# Patient Record
Sex: FEMALE | Race: WHITE | NOT HISPANIC OR LATINO | Employment: UNEMPLOYED | URBAN - METROPOLITAN AREA
[De-identification: names, ages, dates, MRNs, and addresses within clinical notes are randomized per-mention and may not be internally consistent; named-entity substitution may affect disease eponyms.]

---

## 2023-11-26 ENCOUNTER — HOSPITAL ENCOUNTER (EMERGENCY)
Facility: HOSPITAL | Age: 88
Discharge: HOME | DRG: 177 | End: 2023-11-27
Attending: STUDENT IN AN ORGANIZED HEALTH CARE EDUCATION/TRAINING PROGRAM
Payer: MEDICARE

## 2023-11-26 DIAGNOSIS — U07.1 COVID-19: Primary | ICD-10-CM

## 2023-11-26 DIAGNOSIS — J18.9 COMMUNITY ACQUIRED PNEUMONIA, UNSPECIFIED LATERALITY: ICD-10-CM

## 2023-11-26 DIAGNOSIS — J12.82 PNEUMONIA DUE TO COVID-19 VIRUS: ICD-10-CM

## 2023-11-26 DIAGNOSIS — U07.1 PNEUMONIA DUE TO COVID-19 VIRUS: ICD-10-CM

## 2023-11-26 DIAGNOSIS — R53.1 GENERALIZED WEAKNESS: ICD-10-CM

## 2023-11-26 PROCEDURE — 96365 THER/PROPH/DIAG IV INF INIT: CPT

## 2023-11-26 PROCEDURE — 96375 TX/PRO/DX INJ NEW DRUG ADDON: CPT

## 2023-11-26 PROCEDURE — 96367 TX/PROPH/DG ADDL SEQ IV INF: CPT

## 2023-11-26 PROCEDURE — 99285 EMERGENCY DEPT VISIT HI MDM: CPT | Mod: 25 | Performed by: STUDENT IN AN ORGANIZED HEALTH CARE EDUCATION/TRAINING PROGRAM

## 2023-11-26 PROCEDURE — 96366 THER/PROPH/DIAG IV INF ADDON: CPT

## 2023-11-26 NOTE — Clinical Note
Is the patient on isolation?: Yes   Do you expect the patient to require telemetry (informational-only for bed management): No   Do you expect the patient to require observation or inpt? (informational-only for bed management): Observation

## 2023-11-27 ENCOUNTER — APPOINTMENT (OUTPATIENT)
Dept: RADIOLOGY | Facility: HOSPITAL | Age: 88
DRG: 177 | End: 2023-11-27
Payer: MEDICARE

## 2023-11-27 ENCOUNTER — HOSPITAL ENCOUNTER (INPATIENT)
Facility: HOSPITAL | Age: 88
LOS: 8 days | Discharge: NURSING, REHAB OR LONG-TERM CARE | DRG: 177 | End: 2023-12-06
Attending: STUDENT IN AN ORGANIZED HEALTH CARE EDUCATION/TRAINING PROGRAM | Admitting: INTERNAL MEDICINE
Payer: MEDICARE

## 2023-11-27 VITALS
SYSTOLIC BLOOD PRESSURE: 169 MMHG | DIASTOLIC BLOOD PRESSURE: 90 MMHG | TEMPERATURE: 101.2 F | WEIGHT: 123.02 LBS | BODY MASS INDEX: 24.15 KG/M2 | RESPIRATION RATE: 20 BRPM | OXYGEN SATURATION: 90 % | HEIGHT: 60 IN | HEART RATE: 110 BPM

## 2023-11-27 DIAGNOSIS — I48.91 ATRIAL FIBRILLATION WITH RAPID VENTRICULAR RESPONSE (MULTI): ICD-10-CM

## 2023-11-27 DIAGNOSIS — I48.91 ATRIAL FIBRILLATION WITH RVR (MULTI): ICD-10-CM

## 2023-11-27 DIAGNOSIS — R11.0 NAUSEA: ICD-10-CM

## 2023-11-27 DIAGNOSIS — U07.1 PNEUMONIA DUE TO COVID-19 VIRUS: ICD-10-CM

## 2023-11-27 DIAGNOSIS — I48.20 CHRONIC ATRIAL FIBRILLATION (MULTI): ICD-10-CM

## 2023-11-27 DIAGNOSIS — R53.1 GENERALIZED WEAKNESS: Primary | ICD-10-CM

## 2023-11-27 DIAGNOSIS — R13.12 OROPHARYNGEAL DYSPHAGIA: ICD-10-CM

## 2023-11-27 DIAGNOSIS — J12.82 PNEUMONIA DUE TO COVID-19 VIRUS: ICD-10-CM

## 2023-11-27 PROBLEM — R41.82 ALTERED MENTAL STATUS: Status: ACTIVE | Noted: 2023-11-27

## 2023-11-27 PROBLEM — E11.9 TYPE 2 DIABETES MELLITUS (MULTI): Status: ACTIVE | Noted: 2023-11-27

## 2023-11-27 LAB
ALBUMIN SERPL BCP-MCNC: 4.1 G/DL (ref 3.4–5)
ALP SERPL-CCNC: 63 U/L (ref 33–136)
ALT SERPL W P-5'-P-CCNC: 26 U/L (ref 7–45)
ANION GAP SERPL CALC-SCNC: 13 MMOL/L (ref 10–20)
ANION GAP SERPL CALC-SCNC: 15 MMOL/L (ref 10–20)
ANION GAP SERPL CALC-SCNC: 15 MMOL/L (ref 10–20)
APTT PPP: 38 SECONDS (ref 27–38)
AST SERPL W P-5'-P-CCNC: 28 U/L (ref 9–39)
BASOPHILS # BLD AUTO: 0.01 X10*3/UL (ref 0–0.1)
BASOPHILS # BLD AUTO: 0.01 X10*3/UL (ref 0–0.1)
BASOPHILS # BLD AUTO: 0.02 X10*3/UL (ref 0–0.1)
BASOPHILS NFR BLD AUTO: 0.2 %
BASOPHILS NFR BLD AUTO: 0.2 %
BASOPHILS NFR BLD AUTO: 0.3 %
BILIRUB SERPL-MCNC: 0.7 MG/DL (ref 0–1.2)
BNP SERPL-MCNC: 507 PG/ML (ref 0–99)
BUN SERPL-MCNC: 20 MG/DL (ref 6–23)
BUN SERPL-MCNC: 20 MG/DL (ref 6–23)
BUN SERPL-MCNC: 21 MG/DL (ref 6–23)
CALCIUM SERPL-MCNC: 8 MG/DL (ref 8.6–10.3)
CALCIUM SERPL-MCNC: 8.3 MG/DL (ref 8.6–10.3)
CALCIUM SERPL-MCNC: 8.6 MG/DL (ref 8.6–10.3)
CARDIAC TROPONIN I PNL SERPL HS: 13 NG/L (ref 0–13)
CARDIAC TROPONIN I PNL SERPL HS: 39 NG/L (ref 0–13)
CHLORIDE SERPL-SCNC: 91 MMOL/L (ref 98–107)
CHLORIDE SERPL-SCNC: 96 MMOL/L (ref 98–107)
CHLORIDE SERPL-SCNC: 98 MMOL/L (ref 98–107)
CO2 SERPL-SCNC: 25 MMOL/L (ref 21–32)
CO2 SERPL-SCNC: 28 MMOL/L (ref 21–32)
CO2 SERPL-SCNC: 29 MMOL/L (ref 21–32)
CREAT SERPL-MCNC: 0.81 MG/DL (ref 0.5–1.05)
CREAT SERPL-MCNC: 0.89 MG/DL (ref 0.5–1.05)
CREAT SERPL-MCNC: 1.01 MG/DL (ref 0.5–1.05)
D DIMER PPP FEU-MCNC: 694 NG/ML FEU
EOSINOPHIL # BLD AUTO: 0 X10*3/UL (ref 0–0.4)
EOSINOPHIL NFR BLD AUTO: 0 %
ERYTHROCYTE [DISTWIDTH] IN BLOOD BY AUTOMATED COUNT: 13.5 % (ref 11.5–14.5)
ERYTHROCYTE [DISTWIDTH] IN BLOOD BY AUTOMATED COUNT: 13.8 % (ref 11.5–14.5)
ERYTHROCYTE [DISTWIDTH] IN BLOOD BY AUTOMATED COUNT: 13.9 % (ref 11.5–14.5)
FIBRINOGEN PPP-MCNC: 264 MG/DL (ref 200–400)
GFR SERPL CREATININE-BSD FRML MDRD: 52 ML/MIN/1.73M*2
GFR SERPL CREATININE-BSD FRML MDRD: 61 ML/MIN/1.73M*2
GFR SERPL CREATININE-BSD FRML MDRD: 68 ML/MIN/1.73M*2
GLUCOSE SERPL-MCNC: 134 MG/DL (ref 74–99)
GLUCOSE SERPL-MCNC: 177 MG/DL (ref 74–99)
GLUCOSE SERPL-MCNC: 199 MG/DL (ref 74–99)
HCT VFR BLD AUTO: 37 % (ref 36–46)
HCT VFR BLD AUTO: 39.9 % (ref 36–46)
HCT VFR BLD AUTO: 45.5 % (ref 36–46)
HGB BLD-MCNC: 12.7 G/DL (ref 12–16)
HGB BLD-MCNC: 13.8 G/DL (ref 12–16)
HGB BLD-MCNC: 15.9 G/DL (ref 12–16)
IMM GRANULOCYTES # BLD AUTO: 0.03 X10*3/UL (ref 0–0.5)
IMM GRANULOCYTES # BLD AUTO: 0.04 X10*3/UL (ref 0–0.5)
IMM GRANULOCYTES # BLD AUTO: 0.05 X10*3/UL (ref 0–0.5)
IMM GRANULOCYTES NFR BLD AUTO: 0.5 % (ref 0–0.9)
IMM GRANULOCYTES NFR BLD AUTO: 0.6 % (ref 0–0.9)
IMM GRANULOCYTES NFR BLD AUTO: 1.1 % (ref 0–0.9)
INR PPP: 1.3 (ref 0.9–1.1)
LACTATE SERPL-SCNC: 1.2 MMOL/L (ref 0.4–2)
LYMPHOCYTES # BLD AUTO: 0.24 X10*3/UL (ref 0.8–3)
LYMPHOCYTES # BLD AUTO: 0.31 X10*3/UL (ref 0.8–3)
LYMPHOCYTES # BLD AUTO: 0.38 X10*3/UL (ref 0.8–3)
LYMPHOCYTES NFR BLD AUTO: 3.5 %
LYMPHOCYTES NFR BLD AUTO: 6 %
LYMPHOCYTES NFR BLD AUTO: 6.4 %
MAGNESIUM SERPL-MCNC: 1.47 MG/DL (ref 1.6–2.4)
MAGNESIUM SERPL-MCNC: 1.71 MG/DL (ref 1.6–2.4)
MCH RBC QN AUTO: 34.4 PG (ref 26–34)
MCH RBC QN AUTO: 34.5 PG (ref 26–34)
MCH RBC QN AUTO: 34.7 PG (ref 26–34)
MCHC RBC AUTO-ENTMCNC: 34.3 G/DL (ref 32–36)
MCHC RBC AUTO-ENTMCNC: 34.6 G/DL (ref 32–36)
MCHC RBC AUTO-ENTMCNC: 34.9 G/DL (ref 32–36)
MCV RBC AUTO: 100 FL (ref 80–100)
MCV RBC AUTO: 101 FL (ref 80–100)
MCV RBC AUTO: 99 FL (ref 80–100)
MONOCYTES # BLD AUTO: 0.24 X10*3/UL (ref 0.05–0.8)
MONOCYTES # BLD AUTO: 0.47 X10*3/UL (ref 0.05–0.8)
MONOCYTES # BLD AUTO: 0.49 X10*3/UL (ref 0.05–0.8)
MONOCYTES NFR BLD AUTO: 4.6 %
MONOCYTES NFR BLD AUTO: 6.4 %
MONOCYTES NFR BLD AUTO: 9 %
NEUTROPHILS # BLD AUTO: 3.2 X10*3/UL (ref 1.6–5.5)
NEUTROPHILS # BLD AUTO: 4.39 X10*3/UL (ref 1.6–5.5)
NEUTROPHILS # BLD AUTO: 9.81 X10*3/UL (ref 1.6–5.5)
NEUTROPHILS NFR BLD AUTO: 84.2 %
NEUTROPHILS NFR BLD AUTO: 85.8 %
NEUTROPHILS NFR BLD AUTO: 91.2 %
NRBC BLD-RTO: 0 /100 WBCS (ref 0–0)
PHOSPHATE SERPL-MCNC: 3.5 MG/DL (ref 2.5–4.9)
PLATELET # BLD AUTO: 127 X10*3/UL (ref 150–450)
PLATELET # BLD AUTO: 137 X10*3/UL (ref 150–450)
PLATELET # BLD AUTO: 141 X10*3/UL (ref 150–450)
POTASSIUM SERPL-SCNC: 3.7 MMOL/L (ref 3.5–5.3)
POTASSIUM SERPL-SCNC: 3.9 MMOL/L (ref 3.5–5.3)
POTASSIUM SERPL-SCNC: 4 MMOL/L (ref 3.5–5.3)
PROCALCITONIN SERPL-MCNC: 0.07 NG/ML
PROT SERPL-MCNC: 7 G/DL (ref 6.4–8.2)
PROTHROMBIN TIME: 15.2 SECONDS (ref 9.8–12.8)
RBC # BLD AUTO: 3.68 X10*6/UL (ref 4–5.2)
RBC # BLD AUTO: 3.98 X10*6/UL (ref 4–5.2)
RBC # BLD AUTO: 4.62 X10*6/UL (ref 4–5.2)
SARS-COV-2 RNA RESP QL NAA+PROBE: DETECTED
SODIUM SERPL-SCNC: 131 MMOL/L (ref 136–145)
SODIUM SERPL-SCNC: 133 MMOL/L (ref 136–145)
SODIUM SERPL-SCNC: 134 MMOL/L (ref 136–145)
WBC # BLD AUTO: 10.8 X10*3/UL (ref 4.4–11.3)
WBC # BLD AUTO: 3.7 X10*3/UL (ref 4.4–11.3)
WBC # BLD AUTO: 5.2 X10*3/UL (ref 4.4–11.3)

## 2023-11-27 PROCEDURE — 84100 ASSAY OF PHOSPHORUS: CPT | Performed by: STUDENT IN AN ORGANIZED HEALTH CARE EDUCATION/TRAINING PROGRAM

## 2023-11-27 PROCEDURE — 36415 COLL VENOUS BLD VENIPUNCTURE: CPT | Performed by: STUDENT IN AN ORGANIZED HEALTH CARE EDUCATION/TRAINING PROGRAM

## 2023-11-27 PROCEDURE — 96361 HYDRATE IV INFUSION ADD-ON: CPT

## 2023-11-27 PROCEDURE — 85025 COMPLETE CBC W/AUTO DIFF WBC: CPT | Performed by: STUDENT IN AN ORGANIZED HEALTH CARE EDUCATION/TRAINING PROGRAM

## 2023-11-27 PROCEDURE — 99284 EMERGENCY DEPT VISIT MOD MDM: CPT | Performed by: INTERNAL MEDICINE

## 2023-11-27 PROCEDURE — 96374 THER/PROPH/DIAG INJ IV PUSH: CPT

## 2023-11-27 PROCEDURE — 70450 CT HEAD/BRAIN W/O DYE: CPT | Performed by: RADIOLOGY

## 2023-11-27 PROCEDURE — 71046 X-RAY EXAM CHEST 2 VIEWS: CPT | Performed by: RADIOLOGY

## 2023-11-27 PROCEDURE — 2500000004 HC RX 250 GENERAL PHARMACY W/ HCPCS (ALT 636 FOR OP/ED): Performed by: STUDENT IN AN ORGANIZED HEALTH CARE EDUCATION/TRAINING PROGRAM

## 2023-11-27 PROCEDURE — 84145 PROCALCITONIN (PCT): CPT | Mod: PORLAB | Performed by: INTERNAL MEDICINE

## 2023-11-27 PROCEDURE — 84484 ASSAY OF TROPONIN QUANT: CPT | Performed by: STUDENT IN AN ORGANIZED HEALTH CARE EDUCATION/TRAINING PROGRAM

## 2023-11-27 PROCEDURE — 83880 ASSAY OF NATRIURETIC PEPTIDE: CPT | Performed by: STUDENT IN AN ORGANIZED HEALTH CARE EDUCATION/TRAINING PROGRAM

## 2023-11-27 PROCEDURE — 87635 SARS-COV-2 COVID-19 AMP PRB: CPT | Performed by: STUDENT IN AN ORGANIZED HEALTH CARE EDUCATION/TRAINING PROGRAM

## 2023-11-27 PROCEDURE — 36415 COLL VENOUS BLD VENIPUNCTURE: CPT | Performed by: INTERNAL MEDICINE

## 2023-11-27 PROCEDURE — 80048 BASIC METABOLIC PNL TOTAL CA: CPT | Mod: CCI | Performed by: STUDENT IN AN ORGANIZED HEALTH CARE EDUCATION/TRAINING PROGRAM

## 2023-11-27 PROCEDURE — 80053 COMPREHEN METABOLIC PANEL: CPT | Performed by: STUDENT IN AN ORGANIZED HEALTH CARE EDUCATION/TRAINING PROGRAM

## 2023-11-27 PROCEDURE — 71046 X-RAY EXAM CHEST 2 VIEWS: CPT

## 2023-11-27 PROCEDURE — 83735 ASSAY OF MAGNESIUM: CPT | Performed by: STUDENT IN AN ORGANIZED HEALTH CARE EDUCATION/TRAINING PROGRAM

## 2023-11-27 PROCEDURE — 85384 FIBRINOGEN ACTIVITY: CPT | Performed by: INTERNAL MEDICINE

## 2023-11-27 PROCEDURE — 85379 FIBRIN DEGRADATION QUANT: CPT | Performed by: INTERNAL MEDICINE

## 2023-11-27 PROCEDURE — 85730 THROMBOPLASTIN TIME PARTIAL: CPT | Performed by: STUDENT IN AN ORGANIZED HEALTH CARE EDUCATION/TRAINING PROGRAM

## 2023-11-27 PROCEDURE — 83605 ASSAY OF LACTIC ACID: CPT | Performed by: STUDENT IN AN ORGANIZED HEALTH CARE EDUCATION/TRAINING PROGRAM

## 2023-11-27 PROCEDURE — 2500000005 HC RX 250 GENERAL PHARMACY W/O HCPCS: Performed by: STUDENT IN AN ORGANIZED HEALTH CARE EDUCATION/TRAINING PROGRAM

## 2023-11-27 PROCEDURE — 85610 PROTHROMBIN TIME: CPT | Performed by: STUDENT IN AN ORGANIZED HEALTH CARE EDUCATION/TRAINING PROGRAM

## 2023-11-27 PROCEDURE — 70450 CT HEAD/BRAIN W/O DYE: CPT

## 2023-11-27 PROCEDURE — 99285 EMERGENCY DEPT VISIT HI MDM: CPT | Mod: 25 | Performed by: STUDENT IN AN ORGANIZED HEALTH CARE EDUCATION/TRAINING PROGRAM

## 2023-11-27 PROCEDURE — 85025 COMPLETE CBC W/AUTO DIFF WBC: CPT | Performed by: INTERNAL MEDICINE

## 2023-11-27 PROCEDURE — 80048 BASIC METABOLIC PNL TOTAL CA: CPT | Mod: CCI | Performed by: INTERNAL MEDICINE

## 2023-11-27 PROCEDURE — 2500000004 HC RX 250 GENERAL PHARMACY W/ HCPCS (ALT 636 FOR OP/ED): Performed by: INTERNAL MEDICINE

## 2023-11-27 PROCEDURE — 99223 1ST HOSP IP/OBS HIGH 75: CPT | Performed by: INTERNAL MEDICINE

## 2023-11-27 RX ORDER — MAGNESIUM SULFATE HEPTAHYDRATE 40 MG/ML
2 INJECTION, SOLUTION INTRAVENOUS ONCE
Status: COMPLETED | OUTPATIENT
Start: 2023-11-27 | End: 2023-11-27

## 2023-11-27 RX ORDER — ACETAMINOPHEN 325 MG/1
650 TABLET ORAL EVERY 4 HOURS PRN
Status: DISCONTINUED | OUTPATIENT
Start: 2023-11-27 | End: 2023-11-27 | Stop reason: HOSPADM

## 2023-11-27 RX ORDER — LOSARTAN POTASSIUM 25 MG/1
25 TABLET ORAL DAILY
COMMUNITY

## 2023-11-27 RX ORDER — GUAIFENESIN 100 MG/5ML
200 SOLUTION ORAL EVERY 4 HOURS PRN
Status: DISCONTINUED | OUTPATIENT
Start: 2023-11-27 | End: 2023-11-27 | Stop reason: HOSPADM

## 2023-11-27 RX ORDER — NIRMATRELVIR AND RITONAVIR 300-100 MG
2 KIT ORAL 2 TIMES DAILY
Qty: 1 DOSE PACK | Refills: 0 | Status: SHIPPED | OUTPATIENT
Start: 2023-11-27 | End: 2023-12-06 | Stop reason: HOSPADM

## 2023-11-27 RX ORDER — POLYETHYLENE GLYCOL 3350 17 G/17G
17 POWDER, FOR SOLUTION ORAL DAILY
Status: DISCONTINUED | OUTPATIENT
Start: 2023-11-27 | End: 2023-11-27 | Stop reason: HOSPADM

## 2023-11-27 RX ORDER — HEPARIN SODIUM 5000 [USP'U]/ML
5000 INJECTION, SOLUTION INTRAVENOUS; SUBCUTANEOUS EVERY 8 HOURS
Status: DISCONTINUED | OUTPATIENT
Start: 2023-11-27 | End: 2023-11-27 | Stop reason: HOSPADM

## 2023-11-27 RX ORDER — METOPROLOL SUCCINATE 100 MG/1
100 TABLET, EXTENDED RELEASE ORAL DAILY
COMMUNITY

## 2023-11-27 RX ORDER — METOPROLOL TARTRATE 25 MG/1
25 TABLET, FILM COATED ORAL ONCE
Status: COMPLETED | OUTPATIENT
Start: 2023-11-28 | End: 2023-11-28

## 2023-11-27 RX ORDER — NIRMATRELVIR AND RITONAVIR 300-100 MG
2 KIT ORAL 2 TIMES DAILY
Qty: 1 DOSE PACK | Refills: 0 | Status: SHIPPED | OUTPATIENT
Start: 2023-11-27 | End: 2023-11-27 | Stop reason: SDUPTHER

## 2023-11-27 RX ORDER — METOPROLOL TARTRATE 1 MG/ML
5 INJECTION, SOLUTION INTRAVENOUS ONCE
Status: COMPLETED | OUTPATIENT
Start: 2023-11-27 | End: 2023-11-27

## 2023-11-27 RX ORDER — CEFTRIAXONE 1 G/50ML
1 INJECTION, SOLUTION INTRAVENOUS ONCE
Status: COMPLETED | OUTPATIENT
Start: 2023-11-27 | End: 2023-11-27

## 2023-11-27 RX ORDER — ACETAMINOPHEN 325 MG/1
650 TABLET ORAL ONCE
Status: COMPLETED | OUTPATIENT
Start: 2023-11-27 | End: 2023-11-27

## 2023-11-27 RX ORDER — THYROID 60 MG/1
60 TABLET ORAL 3 TIMES DAILY
COMMUNITY

## 2023-11-27 RX ADMIN — AZITHROMYCIN MONOHYDRATE 500 MG: 500 INJECTION, POWDER, LYOPHILIZED, FOR SOLUTION INTRAVENOUS at 04:06

## 2023-11-27 RX ADMIN — MAGNESIUM SULFATE HEPTAHYDRATE 2 G: 40 INJECTION, SOLUTION INTRAVENOUS at 02:19

## 2023-11-27 RX ADMIN — REMDESIVIR 200 MG: 100 INJECTION, POWDER, LYOPHILIZED, FOR SOLUTION INTRAVENOUS at 14:46

## 2023-11-27 RX ADMIN — SODIUM CHLORIDE 500 ML: 9 INJECTION, SOLUTION INTRAVENOUS at 23:26

## 2023-11-27 RX ADMIN — CEFTRIAXONE SODIUM 1 G: 1 INJECTION, SOLUTION INTRAVENOUS at 03:30

## 2023-11-27 RX ADMIN — ACETAMINOPHEN 650 MG: 325 TABLET ORAL at 07:51

## 2023-11-27 RX ADMIN — HEPARIN SODIUM 5000 UNITS: 5000 INJECTION INTRAVENOUS; SUBCUTANEOUS at 04:57

## 2023-11-27 RX ADMIN — METOPROLOL TARTRATE 5 MG: 5 INJECTION INTRAVENOUS at 23:27

## 2023-11-27 RX ADMIN — HEPARIN SODIUM 5000 UNITS: 5000 INJECTION INTRAVENOUS; SUBCUTANEOUS at 12:35

## 2023-11-27 RX ADMIN — ACETAMINOPHEN 650 MG: 325 TABLET ORAL at 22:07

## 2023-11-27 SDOH — SOCIAL STABILITY: SOCIAL INSECURITY: HAVE YOU HAD THOUGHTS OF HARMING ANYONE ELSE?: NO

## 2023-11-27 SDOH — SOCIAL STABILITY: SOCIAL INSECURITY: ABUSE: ADULT

## 2023-11-27 SDOH — SOCIAL STABILITY: SOCIAL INSECURITY: WERE YOU ABLE TO COMPLETE ALL THE BEHAVIORAL HEALTH SCREENINGS?: YES

## 2023-11-27 ASSESSMENT — ACTIVITIES OF DAILY LIVING (ADL)
GROOMING: INDEPENDENT
HEARING - LEFT EAR: DIFFICULTY WITH NOISE
WALKS IN HOME: INDEPENDENT
JUDGMENT_ADEQUATE_SAFELY_COMPLETE_DAILY_ACTIVITIES: YES
TOILETING: INDEPENDENT
FEEDING YOURSELF: INDEPENDENT
DRESSING YOURSELF: INDEPENDENT
HEARING - RIGHT EAR: DIFFICULTY WITH NOISE
ADEQUATE_TO_COMPLETE_ADL: YES
PATIENT'S MEMORY ADEQUATE TO SAFELY COMPLETE DAILY ACTIVITIES?: YES
BATHING: INDEPENDENT

## 2023-11-27 ASSESSMENT — LIFESTYLE VARIABLES
AUDIT-C TOTAL SCORE: 0
HOW OFTEN DO YOU HAVE 6 OR MORE DRINKS ON ONE OCCASION: NEVER
HOW OFTEN DO YOU HAVE A DRINK CONTAINING ALCOHOL: NEVER
AUDIT-C TOTAL SCORE: 0
SKIP TO QUESTIONS 9-10: 1
HOW MANY STANDARD DRINKS CONTAINING ALCOHOL DO YOU HAVE ON A TYPICAL DAY: PATIENT DOES NOT DRINK

## 2023-11-27 ASSESSMENT — COGNITIVE AND FUNCTIONAL STATUS - GENERAL
PATIENT BASELINE BEDBOUND: NO
MOBILITY SCORE: 24
DAILY ACTIVITIY SCORE: 24

## 2023-11-27 ASSESSMENT — PATIENT HEALTH QUESTIONNAIRE - PHQ9
2. FEELING DOWN, DEPRESSED OR HOPELESS: NOT AT ALL
SUM OF ALL RESPONSES TO PHQ9 QUESTIONS 1 & 2: 0
1. LITTLE INTEREST OR PLEASURE IN DOING THINGS: NOT AT ALL

## 2023-11-27 ASSESSMENT — PAIN - FUNCTIONAL ASSESSMENT: PAIN_FUNCTIONAL_ASSESSMENT: 0-10

## 2023-11-27 ASSESSMENT — PAIN SCALES - GENERAL: PAINLEVEL_OUTOF10: 0 - NO PAIN

## 2023-11-27 ASSESSMENT — COLUMBIA-SUICIDE SEVERITY RATING SCALE - C-SSRS
1. IN THE PAST MONTH, HAVE YOU WISHED YOU WERE DEAD OR WISHED YOU COULD GO TO SLEEP AND NOT WAKE UP?: NO
2. HAVE YOU ACTUALLY HAD ANY THOUGHTS OF KILLING YOURSELF?: NO
6. HAVE YOU EVER DONE ANYTHING, STARTED TO DO ANYTHING, OR PREPARED TO DO ANYTHING TO END YOUR LIFE?: NO

## 2023-11-27 NOTE — ED PROVIDER NOTES
"HPI   Chief Complaint   Patient presents with    Weakness, Gen     Pt weak x1 day, daughter has been carrying around house.        This is a 89-year-old female with past medical history of diabetes (reportedly diet-controlled) and atrial fibrillation on anticoagulation who presents to the emergency department with chief complaint of weakness.  She is brought in by her daughter who provides history.  Patient's daughter states that yesterday evening about 28 hours ago they went to a dinner concert.  When the patient was getting into the car after the dinner about 26-27 hours ago the daughter noted that she had some \"unsteadiness\" and trouble walking which was unusual for her.  Throughout the day today, the patient has seemed slightly more confused and slightly weaker than normal, has not eaten or drink much today despite normally having a good appetite and oral intake.  Patient complains of nausea but denies any pain.  Patient and her daughter both deny any recent trauma, travel, or falls.      History provided by:  Relative   used: No                          Clair Coma Scale Score: 15                  Patient History   No past medical history on file.  No past surgical history on file.  No family history on file.  Social History     Tobacco Use    Smoking status: Never    Smokeless tobacco: Not on file   Substance Use Topics    Alcohol use: Never    Drug use: Never           Physical Exam  Constitutional:       General: She is not in acute distress.     Appearance: She is well-developed. She is not toxic-appearing.   HENT:      Head: Normocephalic and atraumatic.      Mouth/Throat:      Mouth: Mucous membranes are moist.   Eyes:      Conjunctiva/sclera: Conjunctivae normal.      Pupils: Pupils are equal, round, and reactive to light.   Cardiovascular:      Rate and Rhythm: Normal rate. Rhythm irregular.      Pulses: Normal pulses.      Heart sounds: Normal heart sounds.   Pulmonary:      " Effort: Pulmonary effort is normal.      Breath sounds: Normal breath sounds.   Abdominal:      General: There is no distension.      Palpations: Abdomen is soft.      Tenderness: There is no abdominal tenderness.   Musculoskeletal:         General: Normal range of motion.      Cervical back: Neck supple.   Skin:     General: Skin is warm and dry.      Findings: No bruising or rash.   Neurological:      Mental Status: She is alert.      Cranial Nerves: No cranial nerve deficit.      Motor: Motor function is intact.      Comments: Alert, resting in bed with eyes open.  Oriented to self and place.  Knows recent holiday (Thanksgiving) but does not know year or month.  Global 4 out of 5 strength in all 4 extremities with slightly decreased left  strength when compared to right.  Sensation intact to light touch throughout.  No facial droop or slurred speech.         Labs Reviewed   CBC WITH AUTO DIFFERENTIAL   PHOSPHORUS   MAGNESIUM   COMPREHENSIVE METABOLIC PANEL   LACTATE   PROTIME-INR   APTT   TROPONIN I, HIGH SENSITIVITY   B-TYPE NATRIURETIC PEPTIDE   URINALYSIS WITH REFLEX MICROSCOPIC       ED Course & MDM   ED Course as of 11/27/23 0301   Mon Nov 27, 2023   0049 Twelve-lead EKG on my interpretation: Normal sinus rhythm with right bundle branch block, short AZ interval 47 ms, normal axis, no STEMI.  [EC]   0153 Labs reviewed, CBC without leukocytosis, CMP with very mild hyponatremia.  Hypomagnesemia noted, will replete intravenously.  Troponin negative. [EC]   0153 CT head demonstrates no acute intracranial findings on radiology interpretation [EC]      ED Course User Index  [EC] Shanika Bonner,        Medical Decision Making  This is a 89-year-old female with past medical history of diabetes (reportedly diet controlled) and atrial fibrillation on anticoagulation presenting to the emergency department with generalized weakness, decreased oral intake, and confusion.  Here, patient is hemodynamically stable without  fever, satting well on room air without respiratory distress.  Physical exam demonstrates generalized weakness with slightly decreased left  strength, otherwise symmetric.  Laboratory workup demonstrates hypomagnesemia which was repleted IV and is otherwise relatively benign without leukocytosis however chest x-ray does demonstrate focal infiltrates which in the setting of patient's decreased oral intake, confusion, and weakness may represent community-acquired pneumonia.  As such, patient was covered for community-acquired pneumonia with IV antibiotics and will be admitted to the hospital for further evaluation and management.  Care discussed with patient and her daughter were present at bedside, both agreeable with this plan.    Amount and/or Complexity of Data Reviewed  Labs:  Decision-making details documented in ED Course.  Radiology:  Decision-making details documented in ED Course.  ECG/medicine tests:  Decision-making details documented in ED Course.      Procedure  Procedures      11/27/23 at 3:03 AM - Shanika Bonner, DO      Shanika Bonner, DO  11/27/23 0304

## 2023-11-27 NOTE — DISCHARGE SUMMARY
Discharge Diagnosis  Pneumonia due to COVID-19 virus    Principal Problem:    Pneumonia due to COVID-19 virus  Active Problems:    Chronic atrial fibrillation (CMS/HCC)    Type 2 diabetes mellitus (CMS/HCC)    Altered mental status    Generalized weakness  Issues Requiring Follow-Up    discharge home with home health.    at pharmacy   pickup Paxlovid    Discharge Meds     Your medication list        START taking these medications        Instructions Last Dose Given Next Dose Due   Paxlovid 300 mg (150 mg x 2)-100 mg tablet therapy pack  Generic drug: nirmatrelvir-ritonavir      Take 2 tablets by mouth 2 times a day. Follow the instructions on the package                 Where to Get Your Medications        These medications were sent to [facility]  [address] [address]      Hours: 8AM to 6PM Mon-Fri, 8AM to 2PM Sat, 8AM to 12PM Sun Phone: [phone]   Paxlovid 300 mg (150 mg x 2)-100 mg tablet therapy pack         Test Results Pending At Discharge  Pending Labs       No current pending labs.            Hospital Course   Kaitlin Irwin is a 89 y.o. female presenting with the above.  The patient presents from home with her daughter who states that she has been crying and very weak.  She brought her to the hospital where she was found to be also very confused, and her daughter states that this is not her norm.  At present the patient is only oriented to person, and unable to contribute to this history.  The patient does have chronic atrial fibrillation and is on a blood thinner.  We are unsure what this blood thinner is at this point.  She also has type 2 diabetes mellitus which according to the ER physician is being diet controlled.  The patient does not appear to be in any distress at this time.  She presents for further evaluation.     Past Medical History  Chronic atrial fibrillation  Type 2 diabetes mellitus     Surgical History  Currently unobtainable from the patient due to her  altered mental status.     Allergies  Penicillin     Social History  She reports that she has never smoked. She does not have any smokeless tobacco history on file. She reports that she does not drink alcohol and does not use drugs.     Family History  Currently unavailable from the patient due to her altered mental status.     Current Medication  azithromycin, 500 mg, intravenous, Once  magnesium sulfate, 2 g, intravenous, Once     Patient does take home medications, but currently we are unable to locate these.  This needs to be ascertained by the attending physician in the morning.     11/27: Patient is weak but not hypoxic.  Patient and daughter had talked and since patient is just sitting in the ER not having much done they feel they can do just as well if not better by taking her home.  We will have her discharged home with some home health care at this time.   returnto ER if symptoms worsen.     see after visit summary for more detailed discharge instructions     35 minutes spent in the care of this patient.  Pertinent Physical Exam At Time of Discharge  Physical Exam    See today's progress note for more    Physical exam findings.  Outpatient Follow-Up    Follow-up with primary care provider in the next 5 to 10 days.      Home with visiting nurse  andPT      Crow Flores MD

## 2023-11-27 NOTE — PROGRESS NOTES
Kaitlin Sarkar is a 89 y.o. female on day 0 of admission presenting with Pneumonia due to COVID-19 virus.    Subjective   Kaitlin Irwin is a 89 y.o. female presenting with the above.  The patient presents from home with her daughter who states that she has been crying and very weak.  She brought her to the hospital where she was found to be also very confused, and her daughter states that this is not her norm.  At present the patient is only oriented to person, and unable to contribute to this history.  The patient does have chronic atrial fibrillation and is on a blood thinner.  We are unsure what this blood thinner is at this point.  She also has type 2 diabetes mellitus which according to the ER physician is being diet controlled.  The patient does not appear to be in any distress at this time.  She presents for further evaluation.     Past Medical History  Chronic atrial fibrillation  Type 2 diabetes mellitus     Surgical History  Currently unobtainable from the patient due to her altered mental status.     Allergies  Penicillin     Social History  She reports that she has never smoked. She does not have any smokeless tobacco history on file. She reports that she does not drink alcohol and does not use drugs.     Family History  Currently unavailable from the patient due to her altered mental status.     Current Medication  azithromycin, 500 mg, intravenous, Once  magnesium sulfate, 2 g, intravenous, Once     Patient does take home medications, but currently we are unable to locate these.  This needs to be ascertained by the attending physician in the morning.     11/27: Patient is weak but not hypoxic.  Patient and daughter had talked and since patient is just sitting in the ER not having much done they feel they can do just as well if not better by taking her home.  We will have her discharged home with some home health care at this time.  Turn to ER if symptoms worsen.       Objective     Physical  Exam  Constitutional:       Appearance: Normal appearance.   HENT:      Head: Normocephalic.      Right Ear: External ear normal.      Left Ear: External ear normal.      Nose: Rhinorrhea present.      Mouth/Throat:      Mouth: Mucous membranes are moist.   Eyes:      Extraocular Movements: Extraocular movements intact.      Conjunctiva/sclera: Conjunctivae normal.      Pupils: Pupils are equal, round, and reactive to light.   Cardiovascular:      Rate and Rhythm: Normal rate and regular rhythm.      Heart sounds: No murmur heard.  Pulmonary:      Effort: Pulmonary effort is normal.      Breath sounds: Normal breath sounds.   Abdominal:      General: Abdomen is flat.      Palpations: Abdomen is soft.   Musculoskeletal:      Cervical back: Normal range of motion.      Left lower leg: Edema present.      Comments: Significant kyphosis of the spine   Skin:     General: Skin is warm.   Neurological:      General: No focal deficit present.      Mental Status: She is alert.      Comments: General frailty and weakness.   Psychiatric:         Mood and Affect: Mood normal.         Last Recorded Vitals  Blood pressure (!) 180/105, pulse 103, temperature 38.4 °C (101.2 °F), temperature source Temporal, resp. rate 17, height 1.524 m (5'), weight 55.8 kg (123 lb 0.3 oz), SpO2 97 %.  Intake/Output last 3 Shifts:  No intake/output data recorded.    Relevant Results                  Scheduled medications  heparin (porcine), 5,000 Units, subcutaneous, q8h  polyethylene glycol, 17 g, oral, Daily  [START ON 11/28/2023] remdesivir, 100 mg, intravenous, q24h      Continuous medications     PRN medications  PRN medications: acetaminophen, guaiFENesin             Assessment/Plan   Principal Problem:    Pneumonia due to COVID-19 virus  Active Problems:    Chronic atrial fibrillation (CMS/HCC)    Type 2 diabetes mellitus (CMS/HCC)    Altered mental status    Generalized weakness    We will resume home meds on discharge home with home  health.       I spent 40 minutes in the professional and overall care of this patient.      Crow Flores MD

## 2023-11-27 NOTE — Clinical Note
Is the patient on isolation?: Yes   Do you expect the patient to require telemetry (informational-only for bed management): Yes   Do you expect the patient to require observation or inpt? (informational-only for bed management): Inpatient

## 2023-11-27 NOTE — H&P
Assessment/Plan   1.)  COVID-19 with pneumonia  The patient will be admitted to stepdown with airborne and contact plus isolation.  She does not have an oxygen requirement at present therefore she will not be started on dexamethasone.  I did do daily blood work which will include a CBC with differential, and a basic metabolic profile both of which will be done for 3 days.  I also ordered a procalcitonin, and a serum ferritin.  I did independently review a chest x-ray done in the emergency room, and my interpretation is patchy bilateral pneumonia right greater than left most likely consistent with COVID-19.  I also independently reviewed a rhythm strip done in the emergency room, and my interpretation is atrial fibrillation with a rate of 95 bpm.  I did consult infectious disease.  At present I will continue to follow the patient clinically, and make adjustments accordingly.  2.)  Altered mental status  The patient has been altered and was not able to contribute to this history.  Most of the history was obtained by the emergency room physician who spoke to the patient's daughter who was not there when I seen the patient.  I did independently review a CT scan of the head done without contrast, and my interpretation is no acute intracranial abnormality or bleed, but she does appear to have small vessel disease.  We will continue to follow this clinically.  3.)  Generalized weakness  Patient has been very weak.  She will need at some point a PT/OT consult.  I will leave this up to the attending physicians to order.  4.)  Type 2 diabetes mellitus diet-controlled  Blood sugars in the emergency room was 134.  I did write for a low Humalog insulin sliding scale, and hemoglobin A1c.  I also started the patient on a carb controlled diet with 45 g of carbs per meal.  Will trend blood sugars, and make necessary adjustments.  5.)  Chronic atrial fibrillation  Patient is on anticoagulation but I have no way of knowing what this  is.  I will leave this up to the day attending in order to ascertain this started.    Chief Complaint   Patient presents with    Weakness, Gen     Pt weak x1 day, daughter has been carrying around house.    Generalized weakness, and mental status change.    History Of Present Illness  Kaitlin Irwin is a 89 y.o. female presenting with the above.  The patient presents from home with her daughter who states that she has been crying and very weak.  She brought her to the hospital where she was found to be also very confused, and her daughter states that this is not her norm.  At present the patient is only oriented to person, and unable to contribute to this history.  The patient does have chronic atrial fibrillation and is on a blood thinner.  We are unsure what this blood thinner is at this point.  She also has type 2 diabetes mellitus which according to the ER physician is being diet controlled.  The patient does not appear to be in any distress at this time.  She presents for further evaluation.    Past Medical History  Chronic atrial fibrillation  Type 2 diabetes mellitus    Surgical History  Currently unobtainable from the patient due to her altered mental status.    Allergies  Penicillin     Social History  She reports that she has never smoked. She does not have any smokeless tobacco history on file. She reports that she does not drink alcohol and does not use drugs.    Family History  Currently unavailable from the patient due to her altered mental status.    Current Medication  azithromycin, 500 mg, intravenous, Once  magnesium sulfate, 2 g, intravenous, Once    Patient does take home medications, but currently we are unable to locate these.  This needs to be ascertained by the attending physician in the morning.    Review of Systems  Unable to obtain due to her altered mental status/confusion.    Physical Exam  Last Recorded Vitals  BP (!) 155/92   Pulse 95   Temp 36.8 °C (98.2 °F)   Resp 22   Wt 55.8 kg  (123 lb 0.3 oz)   SpO2 98%   General: Alert.  Generally confused.  No apparent distress.  Skin: Warm and dry.  No rashes.  Neck: Supple.  No adenopathy or bruit.  HEENT: No infectious processes.  Passages are clear.  Heart: Irregularly irregular with a controlled rate.  No gallops or rubs.  She does have a grade 2/6 systolic murmur.  Lungs: Bibasilar fine rales right greater than left.  No wheezing or rhonchi.  Abdomen: Soft and nontender.  Bowel sounds positive.  No guarding or rebound.  No palpable masses.  Extremities: Without distal edema and/or cyanosis.  Neurological: PERRLA.  She does try to follow simple commands.  She moves all extremities, and Babinski's are bilaterally downgoing.    Relevant Results  Results for orders placed or performed during the hospital encounter of 11/26/23 (from the past 24 hour(s))   CBC and Auto Differential   Result Value Ref Range    WBC 5.2 4.4 - 11.3 x10*3/uL    nRBC 0.0 0.0 - 0.0 /100 WBCs    RBC 3.98 (L) 4.00 - 5.20 x10*6/uL    Hemoglobin 13.8 12.0 - 16.0 g/dL    Hematocrit 39.9 36.0 - 46.0 %     80 - 100 fL    MCH 34.7 (H) 26.0 - 34.0 pg    MCHC 34.6 32.0 - 36.0 g/dL    RDW 13.8 11.5 - 14.5 %    Platelets 137 (L) 150 - 450 x10*3/uL    Neutrophils % 84.2 40.0 - 80.0 %    Immature Granulocytes %, Automated 0.6 0.0 - 0.9 %    Lymphocytes % 6.0 13.0 - 44.0 %    Monocytes % 9.0 2.0 - 10.0 %    Eosinophils % 0.0 0.0 - 6.0 %    Basophils % 0.2 0.0 - 2.0 %    Neutrophils Absolute 4.39 1.60 - 5.50 x10*3/uL    Immature Granulocytes Absolute, Automated 0.03 0.00 - 0.50 x10*3/uL    Lymphocytes Absolute 0.31 (L) 0.80 - 3.00 x10*3/uL    Monocytes Absolute 0.47 0.05 - 0.80 x10*3/uL    Eosinophils Absolute 0.00 0.00 - 0.40 x10*3/uL    Basophils Absolute 0.01 0.00 - 0.10 x10*3/uL   Phosphorus   Result Value Ref Range    Phosphorus 3.5 2.5 - 4.9 mg/dL   Magnesium   Result Value Ref Range    Magnesium 1.47 (L) 1.60 - 2.40 mg/dL   Comprehensive metabolic panel   Result Value Ref Range     Glucose 134 (H) 74 - 99 mg/dL    Sodium 134 (L) 136 - 145 mmol/L    Potassium 3.9 3.5 - 5.3 mmol/L    Chloride 98 98 - 107 mmol/L    Bicarbonate 25 21 - 32 mmol/L    Anion Gap 15 10 - 20 mmol/L    Urea Nitrogen 20 6 - 23 mg/dL    Creatinine 0.81 0.50 - 1.05 mg/dL    eGFR 68 >60 mL/min/1.73m*2    Calcium 8.6 8.6 - 10.3 mg/dL    Albumin 4.1 3.4 - 5.0 g/dL    Alkaline Phosphatase 63 33 - 136 U/L    Total Protein 7.0 6.4 - 8.2 g/dL    AST 28 9 - 39 U/L    Bilirubin, Total 0.7 0.0 - 1.2 mg/dL    ALT 26 7 - 45 U/L   Lactate   Result Value Ref Range    Lactate 1.2 0.4 - 2.0 mmol/L   Protime-INR   Result Value Ref Range    Protime 15.2 (H) 9.8 - 12.8 seconds    INR 1.3 (H) 0.9 - 1.1   aPTT   Result Value Ref Range    aPTT 38 27 - 38 seconds   Troponin I, High Sensitivity   Result Value Ref Range    Troponin I, High Sensitivity 13 0 - 13 ng/L   B-Type Natriuretic Peptide   Result Value Ref Range     (H) 0 - 99 pg/mL   Sars-CoV-2 PCR, Symptomatic   Result Value Ref Range    Coronavirus 2019, PCR Detected (A) Not Detected      XR chest 2 views    Result Date: 11/27/2023  Interpreted By:  Dion Ott, STUDY: XR CHEST 2 VIEWS;  11/27/2023 2:12 am   INDICATION: Signs/Symptoms:confusion.   COMPARISON: None.   ACCESSION NUMBER(S): [id number]   ORDERING CLINICIAN: STEPHANIE YORK   FINDINGS:     Enlargement of the cardiac silhouette. No pleural effusion or pneumothorax. Patchy right medial basilar airspace opacities. Coarsened interstitial markings and apical hyperlucency, likely reflecting emphysema. Diffuse osteopenia. Multilevel spinal degenerative change. No acute osseous abnormality detected.       1. Patchy right medial basilar airspace opacities. Pneumonia is not excluded. 2. Enlargement of the cardiac silhouette. 3.     Signed by: Dion Ott 11/27/2023 2:29 AM Dictation workstation:   [id number]    CT head wo IV contrast    Result Date: 11/27/2023  Interpreted By:  Dion Ott, STUDY: CT  HEAD WO IV CONTRAST;  11/27/2023 1:16 am   INDICATION: confusion, weakness, trouble walking.   COMPARISON: None.   ACCESSION NUMBER(S): [id number]   ORDERING CLINICIAN: STEPHANIE YORK   TECHNIQUE: Axial noncontrast CT images of the head. Sagittal and coronal reformats were provided.   FINDINGS: BRAIN: No acute intracranial hemorrhage. No mass effect or midline shift. Gray-white matter interfaces are preserved.Patchy white matter hypodensities which are nonspecific but likely related to microangiopathic white matter changes. VENTRICLES and EXTRA-AXIAL SPACES: Prominent ventricles and extra-axial CSF spaces, reflecting parenchymal volume loss. EXTRACRANIAL SOFT TISSUES:  Within normal limits. PARANASAL SINUSES/MASTOIDS: The visualized paranasal sinuses and mastoid air cells are aerated. Hypoplastic right maxillary sinus BONES AND ORBITS: No displaced skull fracture. No suspicious osseous lesion.  Status post bilateral lens replacement. OTHER FINDINGS: None.       1. No acute intracranial hemorrhage or mass effect. 2. Volume loss and white matter hypoattenuation, likely chronic microvascular ischemic change.   Signed by: Dion Ott 11/27/2023 1:46 AM Dictation workstation:   [id number]       Curry Blair D.O.

## 2023-11-27 NOTE — CONSULTS
Reason For Consult  COVID    History Of Present Illness  Kaitlin Irwin is a 89 y.o. female presenting with the above.  The patient presents from home with her daughter who states that she has been crying and very weak.  She brought her to the hospital where she was found to be also very confused, and her daughter states that this is not her norm.  At present the patient is only oriented to person, and unable to contribute to this history.  The patient does have chronic atrial fibrillation and is on a blood thinner.  We are unsure what this blood thinner is at this point.  She also has type 2 diabetes mellitus which according to the ER physician is being diet controlled.  The patient does not appear to be in any distress at this time.  She presents for further evaluation.      Past Medical History  She has no past medical history on file.    Surgical History  She has no past surgical history on file.     Social History     Occupational History    Not on file   Tobacco Use    Smoking status: Never    Smokeless tobacco: Not on file   Substance and Sexual Activity    Alcohol use: Never    Drug use: Never    Sexual activity: Not on file     Travel History   Travel since 10/27/23    No documented travel since 10/27/23         Family History  No family history on file.  Allergies  Penicillin       There is no immunization history on file for this patient.  Medications  Home medications:  (Not in a hospital admission)    Current medications:  Scheduled medications  heparin (porcine), 5,000 Units, subcutaneous, q8h  polyethylene glycol, 17 g, oral, Daily      Continuous medications     PRN medications  PRN medications: acetaminophen, guaiFENesin    Review of Systems  As above     Objective  Range of Vitals (last 24 hours)  Heart Rate:  []   Temp:  [36.8 °C (98.2 °F)-38.4 °C (101.2 °F)]   Resp:  [17-26]   BP: (155-177)/()   Height:  [152.4 cm (5')]   Weight:  [55.8 kg (123 lb 0.3 oz)]   SpO2:  [90 %-99 %]   Daily  "Weight  11/26/23 : 55.8 kg (123 lb 0.3 oz)    Body mass index is 24.03 kg/m².     Physical Exam  General: Alert and awake   Skin: no rashes  Neck: supple  CVS: S1S2  Chest:CTAB  GI: soft, non tender  : no CVAT  Psych: alert,oriented  CNS: no FND       Relevant Results  Outside Hospital Results  No  Labs  Results from last 72 hours   Lab Units 11/27/23  0503 11/27/23  0052   WBC AUTO x10*3/uL 3.7* 5.2   HEMOGLOBIN g/dL 12.7 13.8   HEMATOCRIT % 37.0 39.9   PLATELETS AUTO x10*3/uL 141* 137*   NEUTROS PCT AUTO % 85.8 84.2   LYMPHS PCT AUTO % 6.4 6.0   MONOS PCT AUTO % 6.4 9.0   EOS PCT AUTO % 0.0 0.0     Results from last 72 hours   Lab Units 11/27/23  0551 11/27/23  0052   SODIUM mmol/L 133* 134*   POTASSIUM mmol/L 3.7 3.9   CHLORIDE mmol/L 96* 98   CO2 mmol/L 28 25   BUN mg/dL 21 20   CREATININE mg/dL 0.89 0.81   GLUCOSE mg/dL 199* 134*   CALCIUM mg/dL 8.0* 8.6   ANION GAP mmol/L 13 15   EGFR mL/min/1.73m*2 61 68   PHOSPHORUS mg/dL  --  3.5     Results from last 72 hours   Lab Units 11/27/23  0052   ALK PHOS U/L 63   BILIRUBIN TOTAL mg/dL 0.7   PROTEIN TOTAL g/dL 7.0   ALT U/L 26   AST U/L 28   ALBUMIN g/dL 4.1     Estimated Creatinine Clearance: 30.9 mL/min (by C-G formula based on SCr of 0.89 mg/dL).  No results found for: \"CRP\", \"SEDRATE\"  No results found for: \"HIV1X2\", \"HIVCONF\", \"YCEFBO7JH\"  No results found for: \"HEPCABINIT\", \"HEPCAB\", \"HCVPCRQUANT\"    Assessment/Plan     COVID  Atrial fibrillation   DM    Suggest:  Chest x ray reviewed  Low procal and lymphopenia more c/w viral infection  Observe off antibiotics   Not hypoxic- appears comfortable on room air : no need for steroids  Start remdesivir   Trend wbc and temps    If clinically worsens then will start antibiotics. At this time, low concern for superimposed bacterial pneumonia     Freda Aldana MD  "

## 2023-11-28 ENCOUNTER — APPOINTMENT (OUTPATIENT)
Dept: CARDIOLOGY | Facility: HOSPITAL | Age: 88
DRG: 177 | End: 2023-11-28
Payer: MEDICARE

## 2023-11-28 PROBLEM — E11.9 TYPE 2 DIABETES MELLITUS (MULTI): Chronic | Status: ACTIVE | Noted: 2023-11-27

## 2023-11-28 PROBLEM — G93.41 ACUTE METABOLIC ENCEPHALOPATHY: Status: ACTIVE | Noted: 2023-11-28

## 2023-11-28 PROBLEM — I48.91 ATRIAL FIBRILLATION WITH RAPID VENTRICULAR RESPONSE (MULTI): Status: ACTIVE | Noted: 2023-11-28

## 2023-11-28 LAB
AORTIC VALVE MEAN GRADIENT: 4
AORTIC VALVE PEAK VELOCITY: 1.38
APPEARANCE UR: ABNORMAL
AV PEAK GRADIENT: 7.6
AVA (PEAK VEL): 1.88
AVA (VTI): 1.81
BILIRUB UR STRIP.AUTO-MCNC: NEGATIVE MG/DL
CARDIAC TROPONIN I PNL SERPL HS: 50 NG/L (ref 0–13)
COLOR UR: YELLOW
EJECTION FRACTION APICAL 4 CHAMBER: 65.1
EJECTION FRACTION: 57
GLUCOSE BLD MANUAL STRIP-MCNC: 103 MG/DL (ref 74–99)
GLUCOSE BLD MANUAL STRIP-MCNC: 106 MG/DL (ref 74–99)
GLUCOSE BLD MANUAL STRIP-MCNC: 90 MG/DL (ref 74–99)
GLUCOSE BLD MANUAL STRIP-MCNC: 98 MG/DL (ref 74–99)
GLUCOSE UR STRIP.AUTO-MCNC: NEGATIVE MG/DL
KETONES UR STRIP.AUTO-MCNC: ABNORMAL MG/DL
LEFT ATRIUM VOLUME AREA LENGTH INDEX BSA: 51.8
LEFT VENTRICLE INTERNAL DIMENSION DIASTOLE: 3.5 (ref 3.5–6)
LEFT VENTRICULAR OUTFLOW TRACT DIAMETER: 2
LEUKOCYTE ESTERASE UR QL STRIP.AUTO: NEGATIVE
MITRAL VALVE E/A RATIO: 2.9
MITRAL VALVE E/E' RATIO: 19.64
NITRITE UR QL STRIP.AUTO: NEGATIVE
PH UR STRIP.AUTO: 5 [PH]
PROT UR STRIP.AUTO-MCNC: ABNORMAL MG/DL
RBC # UR STRIP.AUTO: ABNORMAL /UL
RBC #/AREA URNS AUTO: NORMAL /HPF
RIGHT VENTRICLE FREE WALL PEAK S': 11.3
RIGHT VENTRICLE PEAK SYSTOLIC PRESSURE: 53.1
SP GR UR STRIP.AUTO: 1.02
SQUAMOUS #/AREA URNS AUTO: NORMAL /HPF
TSH SERPL-ACNC: 0.94 MIU/L (ref 0.44–3.98)
UROBILINOGEN UR STRIP.AUTO-MCNC: <2 MG/DL
WBC #/AREA URNS AUTO: NORMAL /HPF

## 2023-11-28 PROCEDURE — 81001 URINALYSIS AUTO W/SCOPE: CPT | Performed by: STUDENT IN AN ORGANIZED HEALTH CARE EDUCATION/TRAINING PROGRAM

## 2023-11-28 PROCEDURE — 99233 SBSQ HOSP IP/OBS HIGH 50: CPT | Performed by: INTERNAL MEDICINE

## 2023-11-28 PROCEDURE — 93306 TTE W/DOPPLER COMPLETE: CPT

## 2023-11-28 PROCEDURE — 2500000001 HC RX 250 WO HCPCS SELF ADMINISTERED DRUGS (ALT 637 FOR MEDICARE OP): Performed by: STUDENT IN AN ORGANIZED HEALTH CARE EDUCATION/TRAINING PROGRAM

## 2023-11-28 PROCEDURE — 93306 TTE W/DOPPLER COMPLETE: CPT | Performed by: INTERNAL MEDICINE

## 2023-11-28 PROCEDURE — 99223 1ST HOSP IP/OBS HIGH 75: CPT | Performed by: INTERNAL MEDICINE

## 2023-11-28 PROCEDURE — 36415 COLL VENOUS BLD VENIPUNCTURE: CPT | Performed by: STUDENT IN AN ORGANIZED HEALTH CARE EDUCATION/TRAINING PROGRAM

## 2023-11-28 PROCEDURE — 84443 ASSAY THYROID STIM HORMONE: CPT | Performed by: STUDENT IN AN ORGANIZED HEALTH CARE EDUCATION/TRAINING PROGRAM

## 2023-11-28 PROCEDURE — 82947 ASSAY GLUCOSE BLOOD QUANT: CPT

## 2023-11-28 PROCEDURE — 87040 BLOOD CULTURE FOR BACTERIA: CPT | Mod: 59,PORLAB | Performed by: STUDENT IN AN ORGANIZED HEALTH CARE EDUCATION/TRAINING PROGRAM

## 2023-11-28 PROCEDURE — 97162 PT EVAL MOD COMPLEX 30 MIN: CPT | Mod: GP

## 2023-11-28 PROCEDURE — 2500000004 HC RX 250 GENERAL PHARMACY W/ HCPCS (ALT 636 FOR OP/ED): Performed by: STUDENT IN AN ORGANIZED HEALTH CARE EDUCATION/TRAINING PROGRAM

## 2023-11-28 PROCEDURE — 1200000002 HC GENERAL ROOM WITH TELEMETRY DAILY

## 2023-11-28 PROCEDURE — 99222 1ST HOSP IP/OBS MODERATE 55: CPT | Performed by: INTERNAL MEDICINE

## 2023-11-28 PROCEDURE — 2500000001 HC RX 250 WO HCPCS SELF ADMINISTERED DRUGS (ALT 637 FOR MEDICARE OP): Performed by: INTERNAL MEDICINE

## 2023-11-28 PROCEDURE — 84484 ASSAY OF TROPONIN QUANT: CPT | Performed by: STUDENT IN AN ORGANIZED HEALTH CARE EDUCATION/TRAINING PROGRAM

## 2023-11-28 PROCEDURE — 87081 CULTURE SCREEN ONLY: CPT | Mod: PORLAB | Performed by: STUDENT IN AN ORGANIZED HEALTH CARE EDUCATION/TRAINING PROGRAM

## 2023-11-28 PROCEDURE — 97165 OT EVAL LOW COMPLEX 30 MIN: CPT | Mod: GO

## 2023-11-28 PROCEDURE — C9113 INJ PANTOPRAZOLE SODIUM, VIA: HCPCS | Performed by: STUDENT IN AN ORGANIZED HEALTH CARE EDUCATION/TRAINING PROGRAM

## 2023-11-28 RX ORDER — DEXTROSE 50 % IN WATER (D50W) INTRAVENOUS SYRINGE
25
Status: DISCONTINUED | OUTPATIENT
Start: 2023-11-28 | End: 2023-12-06 | Stop reason: HOSPADM

## 2023-11-28 RX ORDER — ACETAMINOPHEN 325 MG/1
650 TABLET ORAL EVERY 4 HOURS PRN
Status: DISCONTINUED | OUTPATIENT
Start: 2023-11-28 | End: 2023-12-06 | Stop reason: HOSPADM

## 2023-11-28 RX ORDER — INSULIN LISPRO 100 [IU]/ML
0-10 INJECTION, SOLUTION INTRAVENOUS; SUBCUTANEOUS
Status: DISCONTINUED | OUTPATIENT
Start: 2023-11-28 | End: 2023-12-06 | Stop reason: HOSPADM

## 2023-11-28 RX ORDER — PANTOPRAZOLE SODIUM 40 MG/1
40 TABLET, DELAYED RELEASE ORAL
Status: DISCONTINUED | OUTPATIENT
Start: 2023-11-28 | End: 2023-12-06 | Stop reason: HOSPADM

## 2023-11-28 RX ORDER — TALC
3 POWDER (GRAM) TOPICAL DAILY
Status: DISCONTINUED | OUTPATIENT
Start: 2023-11-28 | End: 2023-12-06 | Stop reason: HOSPADM

## 2023-11-28 RX ORDER — POLYETHYLENE GLYCOL 3350 17 G/17G
17 POWDER, FOR SOLUTION ORAL DAILY
Status: DISCONTINUED | OUTPATIENT
Start: 2023-11-28 | End: 2023-12-06 | Stop reason: HOSPADM

## 2023-11-28 RX ORDER — THYROID 60 MG/1
60 TABLET ORAL 3 TIMES DAILY
Status: DISCONTINUED | OUTPATIENT
Start: 2023-11-28 | End: 2023-12-06 | Stop reason: HOSPADM

## 2023-11-28 RX ORDER — ONDANSETRON 4 MG/1
4 TABLET, FILM COATED ORAL EVERY 8 HOURS PRN
Status: DISCONTINUED | OUTPATIENT
Start: 2023-11-28 | End: 2023-12-06 | Stop reason: HOSPADM

## 2023-11-28 RX ORDER — LOSARTAN POTASSIUM 25 MG/1
25 TABLET ORAL DAILY
Status: DISCONTINUED | OUTPATIENT
Start: 2023-11-28 | End: 2023-12-06 | Stop reason: HOSPADM

## 2023-11-28 RX ORDER — ONDANSETRON HYDROCHLORIDE 2 MG/ML
4 INJECTION, SOLUTION INTRAVENOUS EVERY 8 HOURS PRN
Status: DISCONTINUED | OUTPATIENT
Start: 2023-11-28 | End: 2023-12-06 | Stop reason: HOSPADM

## 2023-11-28 RX ORDER — METOPROLOL TARTRATE 50 MG/1
50 TABLET ORAL 2 TIMES DAILY
Status: DISCONTINUED | OUTPATIENT
Start: 2023-11-28 | End: 2023-12-06 | Stop reason: HOSPADM

## 2023-11-28 RX ORDER — GUAIFENESIN 600 MG/1
600 TABLET, EXTENDED RELEASE ORAL EVERY 12 HOURS PRN
Status: DISCONTINUED | OUTPATIENT
Start: 2023-11-28 | End: 2023-12-06 | Stop reason: HOSPADM

## 2023-11-28 RX ORDER — METOPROLOL TARTRATE 25 MG/1
25 TABLET, FILM COATED ORAL 2 TIMES DAILY
Status: DISCONTINUED | OUTPATIENT
Start: 2023-11-28 | End: 2023-11-28

## 2023-11-28 RX ORDER — METOPROLOL SUCCINATE 50 MG/1
100 TABLET, EXTENDED RELEASE ORAL DAILY
Status: DISCONTINUED | OUTPATIENT
Start: 2023-11-29 | End: 2023-12-06 | Stop reason: HOSPADM

## 2023-11-28 RX ORDER — METOPROLOL TARTRATE 1 MG/ML
5 INJECTION, SOLUTION INTRAVENOUS EVERY 6 HOURS PRN
Status: DISCONTINUED | OUTPATIENT
Start: 2023-11-28 | End: 2023-12-06 | Stop reason: HOSPADM

## 2023-11-28 RX ORDER — BISACODYL 5 MG
10 TABLET, DELAYED RELEASE (ENTERIC COATED) ORAL DAILY PRN
Status: DISCONTINUED | OUTPATIENT
Start: 2023-11-28 | End: 2023-12-06 | Stop reason: HOSPADM

## 2023-11-28 RX ORDER — PANTOPRAZOLE SODIUM 40 MG/10ML
40 INJECTION, POWDER, LYOPHILIZED, FOR SOLUTION INTRAVENOUS
Status: DISCONTINUED | OUTPATIENT
Start: 2023-11-28 | End: 2023-12-06 | Stop reason: HOSPADM

## 2023-11-28 RX ORDER — DEXTROSE MONOHYDRATE 100 MG/ML
0.3 INJECTION, SOLUTION INTRAVENOUS ONCE AS NEEDED
Status: DISCONTINUED | OUTPATIENT
Start: 2023-11-28 | End: 2023-12-06 | Stop reason: HOSPADM

## 2023-11-28 RX ADMIN — METOPROLOL TARTRATE 50 MG: 50 TABLET, FILM COATED ORAL at 21:53

## 2023-11-28 RX ADMIN — LOSARTAN POTASSIUM 25 MG: 25 TABLET, FILM COATED ORAL at 18:26

## 2023-11-28 RX ADMIN — Medication 3 MG: at 21:53

## 2023-11-28 RX ADMIN — POLYETHYLENE GLYCOL 3350 17 G: 17 POWDER, FOR SOLUTION ORAL at 09:07

## 2023-11-28 RX ADMIN — METOPROLOL TARTRATE 25 MG: 25 TABLET, FILM COATED ORAL at 01:06

## 2023-11-28 RX ADMIN — APIXABAN 2.5 MG: 5 TABLET, FILM COATED ORAL at 01:07

## 2023-11-28 RX ADMIN — APIXABAN 2.5 MG: 5 TABLET, FILM COATED ORAL at 21:53

## 2023-11-28 RX ADMIN — METOPROLOL TARTRATE 50 MG: 50 TABLET, FILM COATED ORAL at 09:07

## 2023-11-28 RX ADMIN — HUMAN ALBUMIN MICROSPHERES AND PERFLUTREN 0.5 ML: 10; .22 INJECTION, SOLUTION INTRAVENOUS at 01:07

## 2023-11-28 RX ADMIN — APIXABAN 2.5 MG: 5 TABLET, FILM COATED ORAL at 09:07

## 2023-11-28 RX ADMIN — PANTOPRAZOLE SODIUM 40 MG: 40 INJECTION, POWDER, FOR SOLUTION INTRAVENOUS at 08:25

## 2023-11-28 SDOH — SOCIAL STABILITY: SOCIAL INSECURITY: ARE YOU OR HAVE YOU BEEN THREATENED OR ABUSED PHYSICALLY, EMOTIONALLY, OR SEXUALLY BY ANYONE?: NO

## 2023-11-28 SDOH — SOCIAL STABILITY: SOCIAL INSECURITY: DOES ANYONE TRY TO KEEP YOU FROM HAVING/CONTACTING OTHER FRIENDS OR DOING THINGS OUTSIDE YOUR HOME?: NO

## 2023-11-28 SDOH — SOCIAL STABILITY: SOCIAL INSECURITY: DO YOU FEEL UNSAFE GOING BACK TO THE PLACE WHERE YOU ARE LIVING?: NO

## 2023-11-28 SDOH — SOCIAL STABILITY: SOCIAL INSECURITY: DO YOU FEEL ANYONE HAS EXPLOITED OR TAKEN ADVANTAGE OF YOU FINANCIALLY OR OF YOUR PERSONAL PROPERTY?: NO

## 2023-11-28 SDOH — SOCIAL STABILITY: SOCIAL INSECURITY: HAS ANYONE EVER THREATENED TO HURT YOUR FAMILY OR YOUR PETS?: NO

## 2023-11-28 SDOH — SOCIAL STABILITY: SOCIAL INSECURITY: ARE THERE ANY APPARENT SIGNS OF INJURIES/BEHAVIORS THAT COULD BE RELATED TO ABUSE/NEGLECT?: NO

## 2023-11-28 SDOH — SOCIAL STABILITY: SOCIAL INSECURITY: HAVE YOU HAD THOUGHTS OF HARMING ANYONE ELSE?: NO

## 2023-11-28 SDOH — SOCIAL STABILITY: SOCIAL INSECURITY: ABUSE: ADULT

## 2023-11-28 SDOH — SOCIAL STABILITY: SOCIAL INSECURITY: WERE YOU ABLE TO COMPLETE ALL THE BEHAVIORAL HEALTH SCREENINGS?: YES

## 2023-11-28 ASSESSMENT — ACTIVITIES OF DAILY LIVING (ADL)
FEEDING YOURSELF: INDEPENDENT
LACK_OF_TRANSPORTATION: NO
BATHING: NEEDS ASSISTANCE
BATHING_ASSISTANCE: MODERATE
PATIENT'S MEMORY ADEQUATE TO SAFELY COMPLETE DAILY ACTIVITIES?: YES
DRESSING YOURSELF: NEEDS ASSISTANCE
WALKS IN HOME: NEEDS ASSISTANCE
HEARING - RIGHT EAR: DIFFICULTY WITH NOISE
HEARING - LEFT EAR: DIFFICULTY WITH NOISE
DRESSING YOURSELF: NEEDS ASSISTANCE
JUDGMENT_ADEQUATE_SAFELY_COMPLETE_DAILY_ACTIVITIES: YES
WALKS IN HOME: NEEDS ASSISTANCE
ADEQUATE_TO_COMPLETE_ADL: YES
ADEQUATE_TO_COMPLETE_ADL: YES
GROOMING: NEEDS ASSISTANCE
FEEDING YOURSELF: INDEPENDENT
PATIENT'S MEMORY ADEQUATE TO SAFELY COMPLETE DAILY ACTIVITIES?: YES
TOILETING: NEEDS ASSISTANCE
TOILETING: NEEDS ASSISTANCE
HEARING - RIGHT EAR: DIFFICULTY WITH NOISE
ADL_ASSISTANCE: INDEPENDENT
GROOMING: NEEDS ASSISTANCE
HEARING - LEFT EAR: DIFFICULTY WITH NOISE
JUDGMENT_ADEQUATE_SAFELY_COMPLETE_DAILY_ACTIVITIES: YES

## 2023-11-28 ASSESSMENT — COGNITIVE AND FUNCTIONAL STATUS - GENERAL
HELP NEEDED FOR BATHING: A LITTLE
MOBILITY SCORE: 15
DRESSING REGULAR LOWER BODY CLOTHING: A LITTLE
TOILETING: A LITTLE
MOVING TO AND FROM BED TO CHAIR: A LITTLE
STANDING UP FROM CHAIR USING ARMS: A LITTLE
CLIMB 3 TO 5 STEPS WITH RAILING: A LOT
STANDING UP FROM CHAIR USING ARMS: A LOT
TOILETING: A LOT
DRESSING REGULAR LOWER BODY CLOTHING: A LOT
PATIENT BASELINE BEDBOUND: NO
CLIMB 3 TO 5 STEPS WITH RAILING: TOTAL
TOILETING: A LITTLE
DAILY ACTIVITIY SCORE: 19
EATING MEALS: A LITTLE
MOVING TO AND FROM BED TO CHAIR: A LITTLE
PERSONAL GROOMING: A LOT
TURNING FROM BACK TO SIDE WHILE IN FLAT BAD: A LITTLE
DRESSING REGULAR UPPER BODY CLOTHING: A LITTLE
DAILY ACTIVITIY SCORE: 20
DRESSING REGULAR UPPER BODY CLOTHING: A LITTLE
MOBILITY SCORE: 17
TURNING FROM BACK TO SIDE WHILE IN FLAT BAD: A LITTLE
DRESSING REGULAR LOWER BODY CLOTHING: A LITTLE
WALKING IN HOSPITAL ROOM: TOTAL
HELP NEEDED FOR BATHING: A LITTLE
MOVING TO AND FROM BED TO CHAIR: TOTAL
HELP NEEDED FOR BATHING: A LOT
PERSONAL GROOMING: A LITTLE
STANDING UP FROM CHAIR USING ARMS: TOTAL
TURNING FROM BACK TO SIDE WHILE IN FLAT BAD: TOTAL
WALKING IN HOSPITAL ROOM: A LOT
DRESSING REGULAR UPPER BODY CLOTHING: A LITTLE
MOVING FROM LYING ON BACK TO SITTING ON SIDE OF FLAT BED WITH BEDRAILS: TOTAL
MOBILITY SCORE: 6
WALKING IN HOSPITAL ROOM: A LOT
MOVING FROM LYING ON BACK TO SITTING ON SIDE OF FLAT BED WITH BEDRAILS: A LITTLE
DAILY ACTIVITIY SCORE: 14
CLIMB 3 TO 5 STEPS WITH RAILING: A LOT

## 2023-11-28 ASSESSMENT — LIFESTYLE VARIABLES
HOW OFTEN DO YOU HAVE 6 OR MORE DRINKS ON ONE OCCASION: NEVER
SKIP TO QUESTIONS 9-10: 1
AUDIT-C TOTAL SCORE: 0
HOW MANY STANDARD DRINKS CONTAINING ALCOHOL DO YOU HAVE ON A TYPICAL DAY: PATIENT DOES NOT DRINK
AUDIT-C TOTAL SCORE: 0
HOW OFTEN DO YOU HAVE A DRINK CONTAINING ALCOHOL: NEVER

## 2023-11-28 ASSESSMENT — PAIN - FUNCTIONAL ASSESSMENT
PAIN_FUNCTIONAL_ASSESSMENT: 0-10
PAIN_FUNCTIONAL_ASSESSMENT: 0-10

## 2023-11-28 ASSESSMENT — PAIN SCALES - GENERAL: PAINLEVEL_OUTOF10: 0 - NO PAIN

## 2023-11-28 ASSESSMENT — COLUMBIA-SUICIDE SEVERITY RATING SCALE - C-SSRS
2. HAVE YOU ACTUALLY HAD ANY THOUGHTS OF KILLING YOURSELF?: NO
1. IN THE PAST MONTH, HAVE YOU WISHED YOU WERE DEAD OR WISHED YOU COULD GO TO SLEEP AND NOT WAKE UP?: NO
6. HAVE YOU EVER DONE ANYTHING, STARTED TO DO ANYTHING, OR PREPARED TO DO ANYTHING TO END YOUR LIFE?: NO

## 2023-11-28 ASSESSMENT — ENCOUNTER SYMPTOMS
SHORTNESS OF BREATH: 1
COUGH: 1
WEAKNESS: 1
CONFUSION: 1

## 2023-11-28 NOTE — ED PROVIDER NOTES
HPI   Chief Complaint   Patient presents with    Weakness, Gen     Pt returned to ed for weakness pts family couldn't get her out of the car after a recent discharge from the hospital        This is a 89-year-old female past medical history of diabetes, atrial fibrillation on anticoagulation who presented to the emerged department yesterday for weakness found to have COVID-pneumonia but not requiring oxygen.  She was altered at that time and CT of her head was obtained which showed no change.  Labs are otherwise unremarkable.  Ultimately patient was admitted.  Today given that patient was boarded in the emergency department, inpatient physician spoke with patient and family who at that time felt comfortable taking patient home.  Daughter states that she was unable to get patient out of the car due to her weakness.  She also states that she is worse now in regards to her mental status than prior to discharging the she is not really responding and she is usually alert and oriented x 4.  Patient denies any issues or concerns.                          [place] Coma Scale Score: 15                  Patient History   No past medical history on file.  No past surgical history on file.  No family history on file.  Social History     Tobacco Use    Smoking status: Never    Smokeless tobacco: Not on file   Substance Use Topics    Alcohol use: Never    Drug use: Never       Physical Exam   ED Triage Vitals   Temp Heart Rate Resp BP   11/27/23 2130 11/27/23 2130 11/27/23 2130 11/27/23 2132   37.1 °C (98.8 °F) (!) 113 20 (!) 191/120      SpO2 Temp Source Heart Rate Source Patient Position   11/27/23 2130 11/27/23 2130 11/27/23 2130 --   95 % Tympanic Monitor       BP Location FiO2 (%)     -- --             Physical Exam  Constitutional:       General: She is not in acute distress.  HENT:      Head: Normocephalic and atraumatic.      Right Ear: Tympanic membrane normal.      Left Ear: Tympanic membrane normal.      Mouth/Throat:       Mouth: Mucous membranes are moist.   Eyes:      Extraocular Movements: Extraocular movements intact.      Conjunctiva/sclera: Conjunctivae normal.      Pupils: Pupils are equal, round, and reactive to light.   Cardiovascular:      Rate and Rhythm: Normal rate and regular rhythm.      Heart sounds: No murmur heard.  Pulmonary:      Effort: Pulmonary effort is normal. No respiratory distress.      Breath sounds: Normal breath sounds. No stridor. No wheezing or rales.   Abdominal:      General: Bowel sounds are normal. There is no distension.      Tenderness: There is no abdominal tenderness. There is no guarding or rebound.   Musculoskeletal:         General: No swelling, tenderness or deformity. Normal range of motion.   Skin:     General: Skin is warm and dry.      Coloration: Skin is not jaundiced.      Findings: No bruising or lesion.   Neurological:      General: No focal deficit present.      Mental Status: She is alert and oriented to person, place, and time. Mental status is at baseline.      Cranial Nerves: No cranial nerve deficit.      Motor: No weakness.   Psychiatric:         Mood and Affect: Mood normal.       Labs Reviewed   CBC WITH AUTO DIFFERENTIAL   BASIC METABOLIC PANEL   MAGNESIUM     No orders to display       ED Course & MDM   ED Course as of 12/13/23 1138   Mon Nov 27, 2023   2359 Patient received 5 mg of metoprolol and blood pressure is 112/77 systolically and heart rate is 90 she is no longer in RVR.  Will give oral metoprolol 25 mg since I am unsure of what her actual dosages. [CF]      ED Course User Index  [CF] Esme Esqueda MD         Diagnoses as of 12/13/23 1138   Generalized weakness   Pneumonia due to COVID-19 virus   Atrial fibrillation with RVR (CMS/HCC)   Atrial fibrillation with rapid ventricular response (CMS/HCC)       Medical Decision Making   89-year-old female past ministry of diabetes, atrial fibrillation on anticoagulation presents to emergency department after  being discharged.  Here today for weakness found to have COVID-pneumonia. daughter states patient continues to be altered and not her baseline.  CT done earlier shows no abnormality. suspect this is all secondary to her pneumonia and COVID. she was tachycardic here in atrial fibrillation her EKG does appear to be her baseline we will obtain a troponin and electrolytes and give patient a small 500 cc bolus. she is not febrile here upon note reviewed does not appear that patient received any medication for her atrial fibrillation she does states she is on metoprolol but cannot tell me the dosage.  Will give her IV metoprolol and reevaluate. patient's electrolytes are all within normal limits.  Will refrain from CT from x-ray given she has no pneumonia and she is COVID-positive.  She continues to not be hypoxic here.    Patient did go into atrial fibrillation RVR she was given 5 mg of metoprolol and she was no longer in RVR which continue to remain stable.  Does not appear the patient was given her dose prior to being discharged, given that she is having worsening weakness and altered mental status secondary to COVID limitations at this time.  Of note CT scan of her on her admission showed no acute abnormalities.        Procedure  Critical Care    Performed by: Esme Esqueda MD  Authorized by: Esme Esqueda MD    Critical care provider statement:     Critical care time (minutes):  20    Critical care time was exclusive of:  Separately billable procedures and treating other patients    Critical care was necessary to treat or prevent imminent or life-threatening deterioration of the following conditions:  Circulatory failure    Critical care was time spent personally by me on the following activities:  Blood draw for specimens, development of treatment plan with patient or surrogate, discussions with consultants, evaluation of patient's response to treatment, examination of patient, ordering and performing  treatments and interventions, ordering and review of laboratory studies, ordering and review of radiographic studies, re-evaluation of patient's condition, review of old charts and pulse oximetry    Care discussed with: admitting provider         Esme Esqueda MD  12/13/23 [id number]       Esme Esqueda MD  03/06/24 [id number]

## 2023-11-28 NOTE — PROGRESS NOTES
Kaitlin Sarkar is a 89 y.o. female on day 0 of admission presenting with Atrial fibrillation with rapid ventricular response (CMS/HCC).    Subjective   Kaitlin Irwin is a 89 y.o. female presenting with the above.  The patient presents from home with her daughter who states that she has been crying and very weak.  She brought her to the hospital where she was found to be also very confused, and her daughter states that this is not her norm.  At present the patient is only oriented to person, and unable to contribute to this history.  The patient does have chronic atrial fibrillation and is on a blood thinner.  We are unsure what this blood thinner is at this point.  She also has type 2 diabetes mellitus which according to the ER physician is being diet controlled.  The patient does not appear to be in any distress at this time.  She presents for further evaluation.     Past Medical History  Chronic atrial fibrillation  Type 2 diabetes mellitus     Surgical History  Currently unobtainable from the patient due to her altered mental status.     Allergies  Penicillin     Social History  She reports that she has never smoked. She does not have any smokeless tobacco history on file. She reports that she does not drink alcohol and does not use drugs.     Family History  Currently unavailable from the patient due to her altered mental status.     Current Medication  azithromycin, 500 mg, intravenous, Once  magnesium sulfate, 2 g, intravenous, Once     Patient does take home medications, but currently we are unable to locate these.  This needs to be ascertained by the attending physician in the morning.     11/27: Patient is weak but not hypoxic.  Patient and daughter had talked and since patient is just sitting in the ER not having much done they feel they can do just as well if not better by taking her home.  We will have her discharged home with some home health care at this time.  Turn to ER if symptoms worsen.     11/28:  Kaitlin Sarkar is a 89 y.o. female with PMHx s/f HTN, chronic atrial fibrillation on Eliquis, T2DM, and admission for generalized weakness/COVID-19/confusion (11/26-11/27) at [facility], presenting with worsening confusion and worsening generalized weakness.  Patient was discharged on 11/27/2023 following a conversation between the patient's family and the inpatient physician, at that time family was feeling that due to the patient boarding in the ER, she would be better cared for at home.  After discharge, the patient went back to her house and family was unable to get her out of the car due to her worsening weakness.  They also report that her mentation worsened during this time even since discharge from the ED, to where she is more confused and not as alert.  Patient does not provide any information at this time due to her encephalopathy in setting of COVID-19 most likely also it is unclear if she has a history of dementia.  Upon my review interview with the patient she would not respond to any commands or answer any questions and nursing staff had noted that this is how she has been since arrival.  Nursing staff noted that she will awaken to voice but not following commands which is what my assessment had established.  I did attempt to call the patient's daughter but this went to voicemail.  I was unable to complete any information regarding the patient's histories, review of systems or history of present illness at this time.  While in the ED she was noted to be in atrial fibrillation with rapid ventricular response with the highest documented rate at 118 at which time she received a one-time dose of 5 mg IV Lopressor and then a one-time dose of 25 mg p.o. Lopressor.     ED Course (Summary):   Vitals on presentation: T98.8, /120 --> 112/77,  --> 90, RR 20, SpO2 95% RA   CBC with differential: WBC 10.8, Hb 15.9, platelets 127  Chemistry panel: Glucose 177, sodium 131, testing 4.0, BUN 20, serum creatinine  1.01, mag 1.71  High-sensitivity troponin 39-repeat pending.  ECG initially atrial fibrillation with RVR which improved to heart rate of 90 following IV metoprolol.  Imaging and results from 11/26/2023-11/27/2023 reviewed.  CXR was with a patchy right basilar airspace opacity with pneumonia not been excluded (ID consultation also reviewed, given negative procal and no fever decision was made to monitor off antibiotics).  CT head was consistent with chronic changes, nothing acute.  COVID was positive days ago now.     ED Course:      ED Course as of 11/28/23 0030   Mon Nov 27, 2023   2359 Patient received 5 mg of metoprolol and blood pressure is 112/77 systolically and heart rate is 90 she is no longer in RVR.  Will give oral metoprolol 25 mg since I am unsure of what her actual dosages. [CF]       11/28: Addendum patient resting comfortably heart rate is relatively well controlled feel she would do fine on a medical floor with telemetry.  Cardiology recommends increasing metoprolol for rate control continue apixaban.  At this time have restarted patient's Paxlovid.  Okay to go to med telemetry patient states she feels better already.  Work on PT and OT with supportive care.      Objective     Physical Exam  Constitutional:       Appearance: Normal appearance.   HENT:      Head: Normocephalic.      Right Ear: External ear normal.      Left Ear: External ear normal.      Nose: Rhinorrhea present.      Mouth/Throat:      Mouth: Mucous membranes are moist.   Eyes:      Extraocular Movements: Extraocular movements intact.      Conjunctiva/sclera: Conjunctivae normal.      Pupils: Pupils are equal, round, and reactive to light.   Cardiovascular:      Rate and Rhythm: Normal rate. Rhythm irregular.      Heart sounds: No murmur heard.  Pulmonary:      Effort: Pulmonary effort is normal.      Breath sounds: Normal breath sounds.   Abdominal:      General: Abdomen is flat.      Palpations: Abdomen is soft.   Musculoskeletal:       Cervical back: Normal range of motion.      Left lower leg: Edema present.      Comments: Significant kyphosis of the spine   Skin:     General: Skin is warm.   Neurological:      General: No focal deficit present.      Mental Status: She is alert.      Comments: General frailty and weakness.   Psychiatric:         Mood and Affect: Mood normal.     Patient is pleasant states she feels better    Last Recorded Vitals  Blood pressure (!) 139/102, pulse 89, temperature 36.3 °C (97.3 °F), temperature source Temporal, resp. rate 23, height 1.524 m (5'), weight 55.8 kg (123 lb), SpO2 93 %.  Intake/Output last 3 Shifts:  I/O last 3 completed shifts:  In: 500 (9 mL/kg) [IV Piggyback:500]  Out: - (0 mL/kg)   Weight: 55.8 kg     Relevant Results                  Scheduled medications  apixaban, 2.5 mg, oral, BID  insulin lispro, 0-10 Units, subcutaneous, Before meals & nightly  melatonin, 3 mg, oral, Daily  metoprolol tartrate, 50 mg, oral, BID  pantoprazole, 40 mg, oral, Daily before breakfast   Or  pantoprazole, 40 mg, intravenous, Daily before breakfast  polyethylene glycol, 17 g, oral, Daily      Continuous medications     PRN medications  PRN medications: acetaminophen, bisacodyl, dextrose 10 % in water (D10W), dextrose, glucagon, guaiFENesin, metoprolol, ondansetron **OR** ondansetron             Assessment/Plan   Principal Problem:    Atrial fibrillation with rapid ventricular response (CMS/HCC)  Active Problems:    Pneumonia due to COVID-19 virus    Type 2 diabetes mellitus (CMS/HCC)    Generalized weakness    Acute metabolic encephalopathy         Admit to med telemetry  On apixaban 2.5 mg twice daily  Metoprolol 50 mg twice a day  Melatonin as needed  Low-dose sliding scale insulin  [place] Thyroid 60 mg daily  Paxlovid pack  Okay to medical floor with telemetry  PT and OT  Increase activity  Monitor oxygen requirements  Discharge planning  See orders for complete plan  Check labs in a.m.  See orders for complete  plan    I spent 40 minutes in the professional and overall care of this patient.      Crow Flores MD

## 2023-11-28 NOTE — PROGRESS NOTES
Kaitlin Sarkar is a 89 y.o. female admitted for Atrial fibrillation with rapid ventricular response (CMS/[facility]). Pharmacy reviewed the patient's [id number] medications and allergies for accuracy.    The list below reflects the PTA list prior to pharmacy medication history. A summary a changes to the PTA medication list has been listed below. Please review each medication in order reconciliation for additional clarification and justification.    No Changes, Tasha Confirmed meds with pharmacy yesterday    Medications added:    Medications modified:    Medications to be removed:    Medications of concern:      Prior to Admission Medications   Prescriptions Last Dose Informant Patient Reported? Taking?   apixaban (Eliquis) 2.5 mg tablet   Yes No   Sig: Take 1 tablet (2.5 mg) by mouth 2 times a day.   losartan (Cozaar) 25 mg tablet   Yes No   Sig: Take 1 tablet (25 mg) by mouth once daily.   metoprolol succinate XL (Toprol-XL) 100 mg 24 hr tablet   Yes No   Sig: Take 1 tablet (100 mg) by mouth once daily. Do not crush or chew.   nirmatrelvir-ritonavir (Paxlovid) 300 mg (150 mg x 2)-100 mg tablet therapy pack   No No   Sig: Take 2 tablets by mouth 2 times a day. Follow the instructions on the package   thyroid, pork, ([place] Thyroid) 60 mg tablet   Yes No   Sig: Take 1 tablet (60 mg) by mouth 3 times a day.      Facility-Administered Medications: None       May Watson CPhT

## 2023-11-28 NOTE — CONSULTS
Inpatient consult to Cardiology  Consult performed by: Denton Graham MD  Consult ordered by: Allison Khalil PA-C  Reason for consult: afib        History Of Present Illness:    Kaitlin Sarkar is a 89 y.o. female with PMHx s/f HTN, chronic atrial fibrillation on Eliquis, T2DM, and admission for generalized weakness/COVID-19/confusion (11/26-11/27) at [place], presenting with worsening confusion and worsening generalized weakness.  Patient was discharged on 11/27/2023 following a conversation between the patient's family and the inpatient physician, at that time family was feeling that due to the patient boarding in the ER, she would be better cared for at home.  After discharge, the patient went back to her house and family was unable to get her out of the car due to her worsening weakness.  They also report that her mentation worsened during this time even since discharge from the ED, to where she is more confused and not as alert.  Patient does not provide any information at this time due to her encephalopathy in setting of COVID-19 most likely also it is unclear if she has a history of dementia.  Upon my review interview with the patient she would not respond to any commands or answer any questions and nursing staff had noted that this is how she has been since arrival.  Nursing staff noted that she will awaken to voice but not following commands which is what my assessment had established.  I did attempt to call the patient's daughter but this went to voicemail.  I was unable to complete any information regarding the patient's histories, review of systems or history of present illness at this time.  While in the ED she was noted to be in atrial fibrillation with rapid ventricular response with the highest documented rate at 118 at which time she received a one-time dose of 5 mg IV Lopressor and then a one-time dose of 25 mg p.o. Lopressor.      Last Recorded Vitals:  Vitals:    11/27/23 2333 11/27/23 2356  "11/28/23 0743 11/28/23 0800   BP: 123/76 112/77 (!) 125/108 (!) 166/109   BP Location:    Left arm   Patient Position:    Lying   Pulse: 101 90 102 110   Resp: 20 20 20 18   Temp:    36.3 °C (97.3 °F)   TempSrc:    Temporal   SpO2: 93% 94% 93% 97%   Weight:       Height:           Last Labs:  CBC - 11/27/2023: 10:08 PM  10.8 15.9 127    45.5      CMP - 11/27/2023: 10:08 PM  8.3 7.0 28 --- 0.7   3.5 4.1 26 63      PTT - 11/27/2023: 12:52 AM  1.3   15.2 38     Troponin I, High Sensitivity   Date/Time Value Ref Range Status   11/28/2023 01:14 AM 50 (H) 0 - 13 ng/L Final   11/27/2023 10:08 PM 39 (H) 0 - 13 ng/L Final   11/27/2023 12:52 AM 13 0 - 13 ng/L Final     BNP   Date/Time Value Ref Range Status   11/27/2023 12:52  (H) 0 - 99 pg/mL Final      Last I/O:  I/O last 3 completed shifts:  In: 500 (9 mL/kg) [IV Piggyback:500]  Out: - (0 mL/kg)   Weight: 55.8 kg     Past Cardiology Tests (Last 3 Years):  EKG:  No results found for this or any previous visit from the past 1095 days.    Echo:  No results found for this or any previous visit from the past 1095 days.    Ejection Fractions:  No results found for: \"EF\"  Cath:  No results found for this or any previous visit from the past 1095 days.    Stress Test:  No results found for this or any previous visit from the past 1095 days.    Cardiac Imaging:  No results found for this or any previous visit from the past 1095 days.      Past Medical History:  She has no past medical history on file.    Past Surgical History:  She has no past surgical history on file.      Social History:  She reports that she has never smoked. She does not have any smokeless tobacco history on file. She reports that she does not drink alcohol and does not use drugs.    Family History:  No family history on file.     Allergies:  Penicillin    Inpatient Medications:  Scheduled medications   Medication Dose Route Frequency    apixaban  2.5 mg oral BID    insulin lispro  0-10 Units subcutaneous " Before meals & nightly    melatonin  3 mg oral Daily    metoprolol tartrate  50 mg oral BID    pantoprazole  40 mg oral Daily before breakfast    Or    pantoprazole  40 mg intravenous Daily before breakfast    polyethylene glycol  17 g oral Daily     PRN medications   Medication    acetaminophen    bisacodyl    dextrose 10 % in water (D10W)    dextrose    glucagon    guaiFENesin    metoprolol    ondansetron    Or    ondansetron     Continuous Medications   Medication Dose Last Rate     Outpatient Medications:  Current Outpatient Medications   Medication Instructions    apixaban (ELIQUIS) 2.5 mg, oral, 2 times daily    losartan (COZAAR) 25 mg, oral, Daily    metoprolol succinate XL (TOPROL-XL) 100 mg, oral, Daily, Do not crush or chew.    nirmatrelvir-ritonavir (Paxlovid) 300 mg (150 mg x 2)-100 mg tablet therapy pack 2 tablets, oral, 2 times daily, Follow the instructions on the package    thyroid (pork) (ARMOUR THYROID) 60 mg, oral, 3 times daily       Physical EConstitutional:       Comments: Frail/Ill Appearing  Female, with audible gurgling of secretions   HENT:      Head: Normocephalic and atraumatic.      Mouth/Throat:      Mouth: Mucous membranes are moist.      Pharynx: Oropharynx is clear.   Eyes:      Extraocular Movements: Extraocular movements intact.      Conjunctiva/sclera: Conjunctivae normal.      Pupils: Pupils are equal, round, and reactive to light.   Neck:      Vascular: No carotid bruit.   Cardiovascular:      Rate and Rhythm: Normal rate. Rhythm irregular.      Pulses: Normal pulses.      Heart sounds: No murmur heard.  Pulmonary:      Breath sounds: Wheezing and rhonchi present.      Comments: Audible gurgling of secretions, diminished breath sounds throughout all lung fields, poor effort, weak congested cough and does seem to be able to clear secretions effectively at this time, saturating well on RA  Abdominal:      General: Abdomen is flat. There is no distension.      Palpations:  Abdomen is soft. There is no mass.      Comments: No signs of discomfort/guarding/rebound on examination   Musculoskeletal:      Cervical back: Normal range of motion and neck supple. No tenderness.      Comments: Will not move extremities to command likely in setting of encephalopathy but passively in tact and does retract to painful stimuli equally bilateral upper and lower extremities with out signs of lateralizing deficits, but again examination is limited by her confusion   Skin:     General: Skin is warm.      Capillary Refill: Capillary refill takes less than 2 seconds.      Findings: No bruising, erythema, lesion or rash.   Neurological:      Mental Status: She is alert.      Comments: Aox0, not following commands, will awaken to voice, Will not move extremities to command likely in setting of encephalopathy but passively in tact and does retract to painful stimuli equally bilateral upper and lower extremities with out signs of lateralizing deficits, but again examination is limited by her confusion,    Psychiatric:      Comments: Confused    xam:       Assessment/Plan   1) Chronic atrial fib  Increase Metoprolol for rate control  On Apixaban   Check echo    Code Status:  Full Code    I spent 30 minutes in the professional and overall care of this patient.        Denton Graham MD

## 2023-11-28 NOTE — CONSULTS
Primary MD: No Assigned PCP Generic Provider, MD    Reason For Consult  Covid-19  Generalized weakness    History Of Present Illness  Kaitlin Sarkar is a 89 y.o. female with PMHx s/f HTN, chronic atrial fibrillation on Eliquis, T2DM, and admission for generalized weakness/COVID-19/confusion (11/26-11/27) at [place], presenting with worsening confusion and worsening generalized weakness.  Patient was discharged on 11/27/2023 following a conversation between the patient's family and the inpatient physician, at that time family was feeling that due to the patient boarding in the ER, she would be better cared for at home.  After discharge, the patient went back to her house and family was unable to get her out of the car due to her worsening weakness.  They also report that her mentation worsened during this time even since discharge from the ED, to where she is more confused and not as alert.  Patient does not provide any information at this time due to her encephalopathy in setting of COVID-19 most likely also it is unclear if she has a history of dementia.  Upon my review interview with the patient she would not respond to any commands or answer any questions and nursing staff had noted that this is how she has been since arrival.  Nursing staff noted that she will awaken to voice but not following commands which is what my assessment had established.  I did attempt to call the patient's daughter but this went to voicemail.  I was unable to complete any information regarding the patient's histories, review of systems or history of present illness at this time.  While in the ED she was noted to be in atrial fibrillation with rapid ventricular response with the highest documented rate at 118 at which time she received a one-time dose of 5 mg IV Lopressor and then a one-time dose of 25 mg p.o. Lopressor.      Past Medical History  She has no past medical history on file.    Surgical History  She has no past surgical  history on file.     Social History     Occupational History    Not on file   Tobacco Use    Smoking status: Never    Smokeless tobacco: Not on file   Substance and Sexual Activity    Alcohol use: Never    Drug use: Never    Sexual activity: Not on file     Travel History   Travel since 10/28/23    No documented travel since 10/28/23         Family History  No family history on file.  Allergies  Penicillin     Immunization History   Administered Date(s) Administered    Moderna SARS-CoV-2 Vaccination 04/07/2021, 05/05/2021, 12/04/2021     Medications  Home medications:  (Not in a hospital admission)    Current medications:  Scheduled medications  apixaban, 2.5 mg, oral, BID  insulin lispro, 0-10 Units, subcutaneous, Before meals & nightly  melatonin, 3 mg, oral, Daily  metoprolol tartrate, 50 mg, oral, BID  pantoprazole, 40 mg, oral, Daily before breakfast   Or  pantoprazole, 40 mg, intravenous, Daily before breakfast  polyethylene glycol, 17 g, oral, Daily      Continuous medications     PRN medications  PRN medications: acetaminophen, bisacodyl, dextrose 10 % in water (D10W), dextrose, glucagon, guaiFENesin, metoprolol, ondansetron **OR** ondansetron    Review of Systems  As per HPI     Objective  Range of Vitals (last 24 hours)  Heart Rate:  []   Temp:  [36.3 °C (97.3 °F)-37.1 °C (98.8 °F)]   Resp:  [17-26]   BP: (112-191)/()   Height:  [152.4 cm (5')]   Weight:  [55.8 kg (123 lb)]   SpO2:  [90 %-97 %]   Daily Weight  11/27/23 : 55.8 kg (123 lb)    Body mass index is 24.02 kg/m².     Physical Exam  General: Awake and answering questions   Skin: no rashes  Neck: supple  CVS: S1S2  Chest:CTAB  GI: soft, non tender  : no CVAT  Psych: alert,oriented  CNS: no FND       Relevant Results  Outside Hospital Results  No  Labs  Results from last 72 hours   Lab Units 11/27/23  2208 11/27/23  0503 11/27/23  0052   WBC AUTO x10*3/uL 10.8 3.7* 5.2   HEMOGLOBIN g/dL 15.9 12.7 13.8   HEMATOCRIT % 45.5 37.0 39.9  "  PLATELETS AUTO x10*3/uL 127* 141* 137*   NEUTROS PCT AUTO % 91.2 85.8 84.2   LYMPHS PCT AUTO % 3.5 6.4 6.0   MONOS PCT AUTO % 4.6 6.4 9.0   EOS PCT AUTO % 0.0 0.0 0.0     Results from last 72 hours   Lab Units 11/27/23  2208 11/27/23  0551 11/27/23  0052   SODIUM mmol/L 131* 133* 134*   POTASSIUM mmol/L 4.0 3.7 3.9   CHLORIDE mmol/L 91* 96* 98   CO2 mmol/L 29 28 25   BUN mg/dL 20 21 20   CREATININE mg/dL 1.01 0.89 0.81   GLUCOSE mg/dL 177* 199* 134*   CALCIUM mg/dL 8.3* 8.0* 8.6   ANION GAP mmol/L 15 13 15   EGFR mL/min/1.73m*2 52* 61 68   PHOSPHORUS mg/dL  --   --  3.5     Results from last 72 hours   Lab Units 11/27/23  0052   ALK PHOS U/L 63   BILIRUBIN TOTAL mg/dL 0.7   PROTEIN TOTAL g/dL 7.0   ALT U/L 26   AST U/L 28   ALBUMIN g/dL 4.1     Estimated Creatinine Clearance: 27.2 mL/min (by C-G formula based on SCr of 1.01 mg/dL).  No results found for: \"CRP\", \"SEDRATE\"  No results found for: \"HIV1X2\", \"HIVCONF\", \"DSUMXT3KA\"  No results found for: \"HEPCABINIT\", \"HEPCAB\", \"HCVPCRQUANT\"  Microbiology  No results found for the last 90 days.      Assessment/Plan     COVID-19  Atrial fibrillation   DM     Suggest:  Presenting with generalized weakness and reportedly also confusion   Check blood cx  Chest x ray reviewed: patchy right nedial basilar opacties- from covid  Low procal and lymphopenia yesterday  more c/w viral infection  Observe off antibiotics   C/w remdesivir  Not hypoxic- appears comfortable on room air : no need for steroids  Trend wbc and temps    If clinically worsens, start vancomycin and cefepime  Check nasal mrsa    Disussed with team   Freda Aldana MD  "

## 2023-11-28 NOTE — PROGRESS NOTES
Physical Therapy    Physical Therapy Evaluation    Patient Name: Kaitlin Sarkar  MRN: [id number]  Today's Date: 11/28/2023   Time Calculation  Start Time: 1337  Stop Time: 1401  Time Calculation (min): 24 min    Assessment/Plan   PT Assessment  PT Assessment Results: Decreased strength, Decreased endurance, Impaired balance, Decreased mobility, Impaired judgement, Decreased safety awareness, Impaired hearing  Rehab Prognosis: Fair  Evaluation/Treatment Tolerance: Patient limited by fatigue  Strengths: Support of extended family/friends  End of Session Communication: Bedside nurse  Assessment Comment: REQUIRES 2 A FOR BED MOBILITY AND TO TAKE A FEW STEPS AT BEDSIDE, BALANCE POOR FOR STANDING, DECREASED ENDURANCE, HIGH FALLRISK WILLNEED SKILLED REHAB ON DISCH  End of Session Patient Position: On cart, Alarm off, caregiver present, Bed, 2 rail up  IP OR SWING BED PT PLAN  Inpatient or Swing Bed: Inpatient  PT Plan  Treatment/Interventions: Bed mobility, Transfer training, Gait training, Endurance training, Strengthening  PT Plan: Skilled PT  PT Frequency: 4 times per week  PT Discharge Recommendations: Moderate intensity level of continued care  Equipment Recommended upon Discharge: Wheeled walker (TBD)  PT Recommended Transfer Status: Assist x2 (FWW)  PT - OK to Discharge: Yes (TO NEXT LOC WHEN MEDICALLY CLEARED)      Subjective   General Visit Information:  General  Reason for Referral: IMPAIRED MOBILITY  Referred By: BECCA LIND  Past Medical History Relevant to Rehab: WEAK, CO NFUSED; DX: COVID PNEUMONIA,HX: DM, AFIB  Family/Caregiver Present: Yes  Caregiver Feedback: DAUGHTER  Co-Treatment: OT  Co-Treatment Reason: FACILITATE MOBILTIY SAFETY  Prior to Session Communication: Bedside nurse  Patient Position Received: On cart, Bed, 2 rail up, Alarm off, caregiver present (ROOM 4 IN ED, ALERT IV)  General Comment: C/O BURING W/ URINATION- STATES SHE TOLD   Home Living:  Home Living  Home Living Comments:  (DAUGHTER, 1  LEVEL HOME, COUPLE CARMEN, AMB AND ADL INDEP, SPONGE BATH HAS A CANE, DOES LIGHT CHORES, DRIVES)     Precautions:  Precautions  Hearing/Visual Limitations: Crow  Medical Precautions: Fall precautions (COVID PRECAUTIONS)       Objective   Pain:  Pain Assessment  Pain Score:  (BURNING ESTEFANIA W/ URINATION)  Cognition:  Cognition  Orientation Level: Disoriented to situation  Safety/Judgement: Exceptions to WFL  Novel Situations: Minimal  Routine Tasks: Minimal  Other (Comment): NOT RECOGNIZING HOW ILLSHE IS  Insight: Mild  Processing Speed: Delayed                Activity Tolerance  Endurance: Tolerates 10 - 20 min exercise with multiple rests    Sensation  Sensation Comment: NO C/O    Strength  Strength Comments: LE ROM ROM; STRENGTH BRENT LE 3/5                     Static Sitting Balance  Static Sitting-Comment/Number of Minutes: FAIR/FAIR-  Dynamic Sitting Balance  Dynamic Sitting-Comments: FAIR-    Static Standing Balance  Static Standing-Comment/Number of Minutes: POOR  Dynamic Standing Balance  Dynamic Standing-Comments: POOR  Functional Assessments:  Bed Mobility  Bed Mobility:  (MOD X2 SUPNE<>SIT,  SITS EOB CGA AS LEANS BACK AND R)    Transfers  Transfer:  (SIT<>STAND MOD X2 MOD RETRO LEAN)    Ambulation/Gait Training  Ambulation/Gait Training Performed:  (MOD X2 ARM IN ARM MOD RETRO LEAN, AMB 2 FT FORWARD AND BACKWARD, VERY FATIGUED W/ MOBILTIY)                    Outcome Measures:  [facility] Basic Mobility  Turning from your back to your side while in a flat bed without using bedrails: Total  Moving from lying on your back to sitting on the side of a flat bed without using bedrails: Total  Moving to and from bed to chair (including a wheelchair): Total  Standing up from a chair using your arms (e.g. wheelchair or bedside chair): Total  To walk in hospital room: Total  Climbing 3-5 steps with railing: Total  Basic Mobility - Total Score: 6    Encounter Problems       Encounter Problems (Active)       Mobility       STG -  Patient will ambulate (Not Progressing)       Start:  11/28/23    Expected End:  12/12/23       FWW + FT         STRENGTHENING (Not Progressing)       Start:  11/28/23    Expected End:  12/19/23       20+ REPS RROM INCREASING STRENGTH TO STABILIZE OOB ACTIVITIES            Transfers       STG - Patient to transfer to and from sit to supine (Not Progressing)       Start:  11/28/23    Expected End:  12/12/23       MIN A NO RAILS         STG - Patient will transfer sit to and from stand (Not Progressing)       Start:  11/28/23    Expected End:  12/12/23       FWW CGA X1 A USIGN PROPER TECHNIQUE                Education Documentation  Precautions, taught by Zahra Herrera, PT at 11/28/2023  3:50 PM.  Learner: Family, Patient  Readiness: Acceptance  Method: Explanation  Response: Needs Reinforcement  Comment: ROLE OF IP REHAB,  MOBILITY SAFETY    Mobility Training, taught by Zahra Herrera, PT at 11/28/2023  3:50 PM.  Learner: Family, Patient  Readiness: Acceptance  Method: Explanation  Response: Needs Reinforcement  Comment: ROLE OF IP REHAB,  MOBILITY SAFETY    Education Comments  No comments found.

## 2023-11-28 NOTE — H&P
History Of Present Illness:  Kaitlin Sarkar is a 89 y.o. female with PMHx s/f HTN, chronic atrial fibrillation on Eliquis, T2DM, and admission for generalized weakness/COVID-19/confusion (11/26-11/27) at [place], presenting with worsening confusion and worsening generalized weakness.  Patient was discharged on 11/27/2023 following a conversation between the patient's family and the inpatient physician, at that time family was feeling that due to the patient boarding in the ER, she would be better cared for at home.  After discharge, the patient went back to her house and family was unable to get her out of the car due to her worsening weakness.  They also report that her mentation worsened during this time even since discharge from the ED, to where she is more confused and not as alert.  Patient does not provide any information at this time due to her encephalopathy in setting of COVID-19 most likely also it is unclear if she has a history of dementia.  Upon my review interview with the patient she would not respond to any commands or answer any questions and nursing staff had noted that this is how she has been since arrival.  Nursing staff noted that she will awaken to voice but not following commands which is what my assessment had established.  I did attempt to call the patient's daughter but this went to voicemail.  I was unable to complete any information regarding the patient's histories, review of systems or history of present illness at this time.  While in the ED she was noted to be in atrial fibrillation with rapid ventricular response with the highest documented rate at 118 at which time she received a one-time dose of 5 mg IV Lopressor and then a one-time dose of 25 mg p.o. Lopressor.    ED Course (Summary):   Vitals on presentation: T98.8, /120 --> 112/77,  --> 90, RR 20, SpO2 95% RA   CBC with differential: WBC 10.8, Hb 15.9, platelets 127  Chemistry panel: Glucose 177, sodium 131, testing  4.0, BUN 20, serum creatinine 1.01, mag 1.71  High-sensitivity troponin 39-repeat pending.  ECG initially atrial fibrillation with RVR which improved to heart rate of 90 following IV metoprolol.  Imaging and results from 11/26/2023-11/27/2023 reviewed.  CXR was with a patchy right basilar airspace opacity with pneumonia not been excluded (ID consultation also reviewed, given negative procal and no fever decision was made to monitor off antibiotics).  CT head was consistent with chronic changes, nothing acute.  COVID was positive days ago now.    ED Course:  ED Course as of 11/28/23 0030   Mon Nov 27, 2023   2359 Patient received 5 mg of metoprolol and blood pressure is 112/77 systolically and heart rate is 90 she is no longer in RVR.  Will give oral metoprolol 25 mg since I am unsure of what her actual dosages. [CF]      ED Course User Index  [CF] Esme Esqueda MD         Diagnoses as of 11/28/23 0030   Generalized weakness   Pneumonia due to COVID-19 virus   Atrial fibrillation with RVR (CMS/HCC)   Atrial fibrillation with rapid ventricular response (CMS/HCC)     Relevant Results  Results for orders placed or performed during the hospital encounter of 11/27/23 (from the past 24 hour(s))   CBC and Auto Differential   Result Value Ref Range    WBC 10.8 4.4 - 11.3 x10*3/uL    nRBC 0.0 0.0 - 0.0 /100 WBCs    RBC 4.62 4.00 - 5.20 x10*6/uL    Hemoglobin 15.9 12.0 - 16.0 g/dL    Hematocrit 45.5 36.0 - 46.0 %    MCV 99 80 - 100 fL    MCH 34.4 (H) 26.0 - 34.0 pg    MCHC 34.9 32.0 - 36.0 g/dL    RDW 13.5 11.5 - 14.5 %    Platelets 127 (L) 150 - 450 x10*3/uL    Neutrophils % 91.2 40.0 - 80.0 %    Immature Granulocytes %, Automated 0.5 0.0 - 0.9 %    Lymphocytes % 3.5 13.0 - 44.0 %    Monocytes % 4.6 2.0 - 10.0 %    Eosinophils % 0.0 0.0 - 6.0 %    Basophils % 0.2 0.0 - 2.0 %    Neutrophils Absolute 9.81 (H) 1.60 - 5.50 x10*3/uL    Immature Granulocytes Absolute, Automated 0.05 0.00 - 0.50 x10*3/uL    Lymphocytes  Absolute 0.38 (L) 0.80 - 3.00 x10*3/uL    Monocytes Absolute 0.49 0.05 - 0.80 x10*3/uL    Eosinophils Absolute 0.00 0.00 - 0.40 x10*3/uL    Basophils Absolute 0.02 0.00 - 0.10 x10*3/uL   Basic metabolic panel   Result Value Ref Range    Glucose 177 (H) 74 - 99 mg/dL    Sodium 131 (L) 136 - 145 mmol/L    Potassium 4.0 3.5 - 5.3 mmol/L    Chloride 91 (L) 98 - 107 mmol/L    Bicarbonate 29 21 - 32 mmol/L    Anion Gap 15 10 - 20 mmol/L    Urea Nitrogen 20 6 - 23 mg/dL    Creatinine 1.01 0.50 - 1.05 mg/dL    eGFR 52 (L) >60 mL/min/1.73m*2    Calcium 8.3 (L) 8.6 - 10.3 mg/dL   Magnesium   Result Value Ref Range    Magnesium 1.71 1.60 - 2.40 mg/dL   Troponin I, High Sensitivity   Result Value Ref Range    Troponin I, High Sensitivity 39 (H) 0 - 13 ng/L      XR chest 2 views    Result Date: 11/27/2023  Interpreted By:  Dion Ott, STUDY: XR CHEST 2 VIEWS;  11/27/2023 2:12 am   INDICATION: Signs/Symptoms:confusion.   COMPARISON: None.   ACCESSION NUMBER(S): [id number]   ORDERING CLINICIAN: STEPHANIE YORK   FINDINGS:     Enlargement of the cardiac silhouette. No pleural effusion or pneumothorax. Patchy right medial basilar airspace opacities. Coarsened interstitial markings and apical hyperlucency, likely reflecting emphysema. Diffuse osteopenia. Multilevel spinal degenerative change. No acute osseous abnormality detected.       1. Patchy right medial basilar airspace opacities. Pneumonia is not excluded. 2. Enlargement of the cardiac silhouette. 3.     Signed by: Dion Ott 11/27/2023 2:29 AM Dictation workstation:   [id number]    CT head wo IV contrast    Result Date: 11/27/2023  Interpreted By:  Dion Ott, STUDY: CT HEAD WO IV CONTRAST;  11/27/2023 1:16 am   INDICATION: confusion, weakness, trouble walking.   COMPARISON: None.   ACCESSION NUMBER(S): [id number]   ORDERING CLINICIAN: STEPHANIE YORK   TECHNIQUE: Axial noncontrast CT images of the head. Sagittal and coronal reformats were provided.    FINDINGS: BRAIN: No acute intracranial hemorrhage. No mass effect or midline shift. Gray-white matter interfaces are preserved.Patchy white matter hypodensities which are nonspecific but likely related to microangiopathic white matter changes. VENTRICLES and EXTRA-AXIAL SPACES: Prominent ventricles and extra-axial CSF spaces, reflecting parenchymal volume loss. EXTRACRANIAL SOFT TISSUES:  Within normal limits. PARANASAL SINUSES/MASTOIDS: The visualized paranasal sinuses and mastoid air cells are aerated. Hypoplastic right maxillary sinus BONES AND ORBITS: No displaced skull fracture. No suspicious osseous lesion.  Status post bilateral lens replacement. OTHER FINDINGS: None.       1. No acute intracranial hemorrhage or mass effect. 2. Volume loss and white matter hypoattenuation, likely chronic microvascular ischemic change.   Signed by: Dion Ott 11/27/2023 1:46 AM Dictation workstation:   [id number]     Scheduled medications:  apixaban, 2.5 mg, oral, BID  insulin lispro, 0-10 Units, subcutaneous, Before meals & nightly  melatonin, 3 mg, oral, Daily  metoprolol tartrate, 25 mg, oral, Once  metoprolol tartrate, 25 mg, oral, BID  pantoprazole, 40 mg, oral, Daily before breakfast   Or  pantoprazole, 40 mg, intravenous, Daily before breakfast  perflutren protein A microsphere, 0.5 mL, intravenous, Once in imaging  polyethylene glycol, 17 g, oral, Daily      Continuous medications:     PRN medications:  PRN medications: acetaminophen, bisacodyl, dextrose 10 % in water (D10W), dextrose, glucagon, guaiFENesin, ondansetron **OR** ondansetron      Past Medical History  She has no past medical history on file.    Surgical History  She has no past surgical history on file.     Social History  She reports that she has never smoked. She does not have any smokeless tobacco history on file. She reports that she does not drink alcohol and does not use drugs.    Family History  No family history on file.      Allergies  Penicillin    Code Status  Full Code     Review of Systems   Unable to perform ROS: Mental status change   Respiratory:  Positive for cough and shortness of breath.    Neurological:  Positive for weakness.   Psychiatric/Behavioral:  Positive for confusion.      Last Recorded Vitals  /77   Pulse 90   Temp 37.1 °C (98.8 °F) (Tympanic)   Resp 20   Wt 55.8 kg (123 lb)   SpO2 94%      Physical Exam  Constitutional:       Comments: Frail/Ill Appearing  Female, with audible gurgling of secretions   HENT:      Head: Normocephalic and atraumatic.      Mouth/Throat:      Mouth: Mucous membranes are moist.      Pharynx: Oropharynx is clear.   Eyes:      Extraocular Movements: Extraocular movements intact.      Conjunctiva/sclera: Conjunctivae normal.      Pupils: Pupils are equal, round, and reactive to light.   Neck:      Vascular: No carotid bruit.   Cardiovascular:      Rate and Rhythm: Normal rate. Rhythm irregular.      Pulses: Normal pulses.      Heart sounds: No murmur heard.  Pulmonary:      Breath sounds: Wheezing and rhonchi present.      Comments: Audible gurgling of secretions, diminished breath sounds throughout all lung fields, poor effort, weak congested cough and does seem to be able to clear secretions effectively at this time, saturating well on RA  Abdominal:      General: Abdomen is flat. There is no distension.      Palpations: Abdomen is soft. There is no mass.      Comments: No signs of discomfort/guarding/rebound on examination   Musculoskeletal:      Cervical back: Normal range of motion and neck supple. No tenderness.      Comments: Will not move extremities to command likely in setting of encephalopathy but passively in tact and does retract to painful stimuli equally bilateral upper and lower extremities with out signs of lateralizing deficits, but again examination is limited by her confusion   Skin:     General: Skin is warm.      Capillary Refill: Capillary refill  takes less than 2 seconds.      Findings: No bruising, erythema, lesion or rash.   Neurological:      Mental Status: She is alert.      Comments: Aox0, not following commands, will awaken to voice, Will not move extremities to command likely in setting of encephalopathy but passively in tact and does retract to painful stimuli equally bilateral upper and lower extremities with out signs of lateralizing deficits, but again examination is limited by her confusion,    Psychiatric:      Comments: Confused         Assessment/Plan   Principal Problem:    Atrial fibrillation with rapid ventricular response (CMS/HCC)  Active Problems:    Pneumonia due to COVID-19 virus    Type 2 diabetes mellitus (CMS/HCC)    Generalized weakness    Acute metabolic encephalopathy    89 y.o. female with PMHx s/f HTN, chronic atrial fibrillation on Eliquis, T2DM, and admission for generalized weakness/COVID-19/confusion (11/26-11/27) at [place], presenting with worsening confusion and worsening generalized weakness.    1.) Atrial fibrillation with rapid ventricular response  -Suspect due to patient being in the ED all day and then leaving home with family as home med rec was never able to be confirmed, made patient's home metoprolol dose was not able to be confirmed by family or herself  -Mag okay.  Will check TSH in the morning.  -Will start patient on metoprolol twice daily for now, continue Eliquis at reduced dose.  -Monitor on telemetry.  Check echocardiogram.  -Cardiology consultation appreciated    2.) COVID-19  -Patient remains saturating well on room air  -Procalcitonin earlier in the day negative.  -Supportive care  -ID consultation appreciated    3.) Acute metabolic encephalopathy/worsening confusion  -CT head reviewed from 2 days ago.  Chronic changes evident.  Could consider MRI; however no focal or lateralizing deficits appreciated on examination.  -Patient is not overtly septic or hypoxic, but could drive a bit of her confusion  from COVID.  -Will check TSH and UA out of abundance of caution in the morning  -PT/OT, supportive care  -If worsening mentation or interval development of focal deficits, will obtain MRI and pursue additional evaluation from that standpoint    4.) Generalized weakness  -Suspect secondary to above  -Also checking TSH and UA as mentioned above  -PT/OT/supportive care    5.) Hypertension  -Uncertain patient's home meds  -Significantly elevated BP on presentation but did improve    6.) DM-II   -SSI ACHS   -Accucheks   -Hypoglycemic protocol     7.) Concern for Dysphagia  -possibly in setting of acute confusion  -NPO and will transition to prn IV Lopressor  -SLP appreciated    -if persistently NPO then will need to transition to IV/subcutaneous anticoagulation but currently with high falls risk as well given weakness     Dragon dictation software was used to dictate this note and thus there may be minor errors in translation/transcription including garbled speech or misspellings. Please contact for clarification if needed.

## 2023-11-28 NOTE — PROGRESS NOTES
Occupational Therapy    Evaluation    Patient Name: Kaitlin Sarkar  MRN: [id number]  Today's Date: 11/28/2023  Time Calculation  Start Time: 1338  Stop Time: 1401  Time Calculation (min): 23 min    Assessment  IP OT Assessment  OT Assessment: OT eval completed. The patient is functioning below baseline for ADLs and mobility. can benefit from continued OT  End of Session Communication: Bedside nurse  End of Session Patient Position: Bed, 2 rail up, Alarm off, not on at start of session    Plan:  Treatment Interventions: ADL retraining, Functional transfer training, UE strengthening/ROM, Endurance training, Cognitive reorientation, Patient/family training, Equipment evaluation/education, Neuromuscular reeducation  OT Frequency: 3 times per week  OT Discharge Recommendations: Moderate intensity level of continued care  OT - OK to Discharge: Yes    Subjective     Current Problem:  1. Atrial fibrillation with rapid ventricular response (CMS/HCC)        2. Generalized weakness        3. Pneumonia due to COVID-19 virus        4. Atrial fibrillation with RVR (CMS/HCC)        5. Chronic atrial fibrillation (CMS/HCC)  Transthoracic Echo (TTE) Complete    Transthoracic Echo (TTE) Complete          General:  General  Reason for Referral: ADLs, safety assessment  Referred By: Simran  Past Medical History Relevant to Rehab: admit for AFIB RVR, weakness. family unable to assist pt and pt unable to ambulate. pt recently seen in ED and DX COVID positive. DC home but family unable to get pt out of car d/t weakness so returned to ED. pt seen this admit in ED with daughter present at bedside. PMH: dementia, HTN, AFIB, DM  Family/Caregiver Present: Yes  Caregiver Feedback: daughter  Co-Treatment: PT  Co-Treatment Reason: to optimize safety and mobility, while focusing on discipline specific goals  Prior to Session Communication: Bedside nurse  Patient Position Received: Bed, 2 rail up, Alarm off, not on at start of session  General  Comment: pt alert and agreeable to therapy    Precautions:  Hearing/Visual Limitations: mild [place]  Medical Precautions: Fall precautions, Infection precautions    Vital Signs:       Pain:  Pain Assessment  Pain Assessment: 0-10  Pain Score:  (mild to moderate pain with urination.)    Objective     Cognition:  Overall Cognitive Status: Within Functional Limits  Orientation Level:  (Oriented to person, place, time.)  Processing Speed: Delayed             Home Living:  Type of Home: House  Lives With: Adult children  Home Adaptive Equipment: None  Home Layout: One level  Home Access: Stairs to enter with rails  Entrance Stairs-Number of Steps: 2     Prior Function:  Receives Help From: Family  ADL Assistance: Independent  Homemaking Assistance: Independent (fairly independent but family also able to assist.)  Ambulatory Assistance: Independent      ADL:  Eating Assistance: Minimal  Grooming Assistance: Minimal  Bathing Assistance: Moderate  UE Dressing Assistance: Minimal  LE Dressing Assistance: Maximal  Toileting Assistance with Device: Maximal  ADL Comments: anticipated current levels of functioning        Bed Mobility/Transfers:   Bed Mobility  Bed Mobility: Yes  Bed Mobility 1  Bed Mobility 1: Supine to sitting, Sitting to supine  Level of Assistance 1: Moderate assistance (x2)  Transfers  Transfer: Yes  Transfer 1  Transfer From 1: Sit to  Transfer to 1: Stand  Transfer Device 1:  (hand held assist)  Transfer Level of Assistance 1: Moderate assistance, +2, Maximum verbal cues  Trials/Comments 1: the pt stood and took a step forward and back at bedside with mod a x2 for functional mobility        Sitting Balance:  Static Sitting Balance  Static Sitting-Balance Support: Bilateral upper extremity supported  Static Sitting-Level of Assistance: Contact guard  Dynamic Sitting Balance  Dynamic Sitting-Balance Support: Bilateral upper extremity supported  Dynamic Sitting-Comments: min A    Standing Balance:  Static  Standing Balance  Static Standing-Balance Support: Bilateral upper extremity supported  Static Standing-Level of Assistance: Minimum assistance  Dynamic Standing Balance  Dynamic Standing-Balance Support: Bilateral upper extremity supported  Dynamic Standing-Comments: mod a        Strength:  Strength Comments: BUE ROM 75% normal range. BUE strength grossly 3/5 to 3+/5      Outcome Measures: [facility] Daily Activity  Putting on and taking off regular lower body clothing: A lot  Bathing (including washing, rinsing, drying): A lot  Putting on and taking off regular upper body clothing: A little  Toileting, which includes using toilet, bedpan or urinal: A lot  Taking care of personal grooming such as brushing teeth: A lot  Eating Meals: A little  Daily Activity - Total Score: 14                    EDUCATION:     Education Documentation  ADL Training, taught by Petty Salazar OT at 11/28/2023  3:01 PM.  Learner: Patient  Readiness: Acceptance  Method: Demonstration  Response: Needs Reinforcement    Education Comments  No comments found.        Goals:   Encounter Problems       Encounter Problems (Active)       ADLs       Patient with complete lower body dressing with modified independent level of assistance donning and doffing all LE clothes  with PRN adaptive equipment while supported sitting and standing (Progressing)       Start:  11/28/23    Expected End:  12/12/23            Patient will complete daily grooming tasks brushing teeth and washing face/hair with modified independent level of assistance and PRN adaptive equipment while supported sitting and standing. (Progressing)       Start:  11/28/23    Expected End:  12/12/23               TRANSFERS       Patient will complete functional transfers and functional mobility with least restrictive device with supervision level of assistance. (Progressing)       Start:  11/28/23    Expected End:  12/12/23

## 2023-11-28 NOTE — CARE PLAN
Problem: Mobility  Goal: STG - Patient will ambulate  Description: FWW + FT  Outcome: Not Progressing  Goal: STRENGTHENING  Description: 20+ REPS RROM INCREASING STRENGTH TO STABILIZE OOB ACTIVITIES  Outcome: Not Progressing     Problem: Transfers  Goal: STG - Patient to transfer to and from sit to supine  Description: MIN A NO RAILS  Outcome: Not Progressing  Goal: STG - Patient will transfer sit to and from stand  Description: FWW CGA X1 A USIGN PROPER TECHNIQUE  Outcome: Not Progressing

## 2023-11-29 LAB
ANION GAP SERPL CALC-SCNC: 17 MMOL/L (ref 10–20)
BASOPHILS # BLD AUTO: 0.01 X10*3/UL (ref 0–0.1)
BASOPHILS NFR BLD AUTO: 0.2 %
BUN SERPL-MCNC: 38 MG/DL (ref 6–23)
CALCIUM SERPL-MCNC: 8.1 MG/DL (ref 8.6–10.3)
CHLORIDE SERPL-SCNC: 99 MMOL/L (ref 98–107)
CO2 SERPL-SCNC: 21 MMOL/L (ref 21–32)
CREAT SERPL-MCNC: 0.96 MG/DL (ref 0.5–1.05)
EOSINOPHIL # BLD AUTO: 0 X10*3/UL (ref 0–0.4)
EOSINOPHIL NFR BLD AUTO: 0 %
ERYTHROCYTE [DISTWIDTH] IN BLOOD BY AUTOMATED COUNT: 13.3 % (ref 11.5–14.5)
GFR SERPL CREATININE-BSD FRML MDRD: 55 ML/MIN/1.73M*2
GLUCOSE BLD MANUAL STRIP-MCNC: 126 MG/DL (ref 74–99)
GLUCOSE BLD MANUAL STRIP-MCNC: 129 MG/DL (ref 74–99)
GLUCOSE BLD MANUAL STRIP-MCNC: 133 MG/DL (ref 74–99)
GLUCOSE BLD MANUAL STRIP-MCNC: 92 MG/DL (ref 74–99)
GLUCOSE SERPL-MCNC: 78 MG/DL (ref 74–99)
HCT VFR BLD AUTO: 43.1 % (ref 36–46)
HGB BLD-MCNC: 14.8 G/DL (ref 12–16)
IMM GRANULOCYTES # BLD AUTO: 0.03 X10*3/UL (ref 0–0.5)
IMM GRANULOCYTES NFR BLD AUTO: 0.5 % (ref 0–0.9)
LYMPHOCYTES # BLD AUTO: 0.64 X10*3/UL (ref 0.8–3)
LYMPHOCYTES NFR BLD AUTO: 9.8 %
MAGNESIUM SERPL-MCNC: 2.05 MG/DL (ref 1.6–2.4)
MCH RBC QN AUTO: 33.7 PG (ref 26–34)
MCHC RBC AUTO-ENTMCNC: 34.3 G/DL (ref 32–36)
MCV RBC AUTO: 98 FL (ref 80–100)
MONOCYTES # BLD AUTO: 0.49 X10*3/UL (ref 0.05–0.8)
MONOCYTES NFR BLD AUTO: 7.5 %
NEUTROPHILS # BLD AUTO: 5.37 X10*3/UL (ref 1.6–5.5)
NEUTROPHILS NFR BLD AUTO: 82 %
NRBC BLD-RTO: 0 /100 WBCS (ref 0–0)
PLATELET # BLD AUTO: 110 X10*3/UL (ref 150–450)
POTASSIUM SERPL-SCNC: 4.2 MMOL/L (ref 3.5–5.3)
RBC # BLD AUTO: 4.39 X10*6/UL (ref 4–5.2)
SODIUM SERPL-SCNC: 133 MMOL/L (ref 136–145)
WBC # BLD AUTO: 6.5 X10*3/UL (ref 4.4–11.3)

## 2023-11-29 PROCEDURE — 82947 ASSAY GLUCOSE BLOOD QUANT: CPT

## 2023-11-29 PROCEDURE — 80048 BASIC METABOLIC PNL TOTAL CA: CPT | Performed by: STUDENT IN AN ORGANIZED HEALTH CARE EDUCATION/TRAINING PROGRAM

## 2023-11-29 PROCEDURE — 83735 ASSAY OF MAGNESIUM: CPT | Performed by: STUDENT IN AN ORGANIZED HEALTH CARE EDUCATION/TRAINING PROGRAM

## 2023-11-29 PROCEDURE — 97110 THERAPEUTIC EXERCISES: CPT | Mod: GP,CQ

## 2023-11-29 PROCEDURE — 2500000001 HC RX 250 WO HCPCS SELF ADMINISTERED DRUGS (ALT 637 FOR MEDICARE OP): Performed by: INTERNAL MEDICINE

## 2023-11-29 PROCEDURE — 1200000002 HC GENERAL ROOM WITH TELEMETRY DAILY

## 2023-11-29 PROCEDURE — 2500000005 HC RX 250 GENERAL PHARMACY W/O HCPCS: Performed by: INTERNAL MEDICINE

## 2023-11-29 PROCEDURE — 97530 THERAPEUTIC ACTIVITIES: CPT | Mod: GP,CQ

## 2023-11-29 PROCEDURE — 85025 COMPLETE CBC W/AUTO DIFF WBC: CPT | Performed by: INTERNAL MEDICINE

## 2023-11-29 PROCEDURE — 2500000001 HC RX 250 WO HCPCS SELF ADMINISTERED DRUGS (ALT 637 FOR MEDICARE OP): Performed by: STUDENT IN AN ORGANIZED HEALTH CARE EDUCATION/TRAINING PROGRAM

## 2023-11-29 PROCEDURE — 99233 SBSQ HOSP IP/OBS HIGH 50: CPT | Performed by: INTERNAL MEDICINE

## 2023-11-29 PROCEDURE — 97535 SELF CARE MNGMENT TRAINING: CPT | Mod: GO,CO

## 2023-11-29 PROCEDURE — 92610 EVALUATE SWALLOWING FUNCTION: CPT | Mod: GN | Performed by: SPEECH-LANGUAGE PATHOLOGIST

## 2023-11-29 PROCEDURE — 36415 COLL VENOUS BLD VENIPUNCTURE: CPT | Performed by: INTERNAL MEDICINE

## 2023-11-29 PROCEDURE — 97110 THERAPEUTIC EXERCISES: CPT | Mod: GO,CO

## 2023-11-29 RX ADMIN — LOSARTAN POTASSIUM 25 MG: 25 TABLET, FILM COATED ORAL at 11:04

## 2023-11-29 RX ADMIN — METOPROLOL TARTRATE 5 MG: 5 INJECTION INTRAVENOUS at 07:05

## 2023-11-29 RX ADMIN — METOPROLOL TARTRATE 50 MG: 50 TABLET, FILM COATED ORAL at 11:03

## 2023-11-29 RX ADMIN — LEVOTHYROXINE, LIOTHYRONINE 60 MG: 38; 9 TABLET ORAL at 15:22

## 2023-11-29 RX ADMIN — APIXABAN 2.5 MG: 5 TABLET, FILM COATED ORAL at 21:31

## 2023-11-29 RX ADMIN — METOPROLOL SUCCINATE 100 MG: 50 TABLET, EXTENDED RELEASE ORAL at 11:04

## 2023-11-29 RX ADMIN — APIXABAN 2.5 MG: 5 TABLET, FILM COATED ORAL at 11:03

## 2023-11-29 RX ADMIN — Medication 3 MG: at 21:31

## 2023-11-29 RX ADMIN — LEVOTHYROXINE, LIOTHYRONINE 60 MG: 38; 9 TABLET ORAL at 21:31

## 2023-11-29 RX ADMIN — METOPROLOL TARTRATE 50 MG: 50 TABLET, FILM COATED ORAL at 21:31

## 2023-11-29 ASSESSMENT — COGNITIVE AND FUNCTIONAL STATUS - GENERAL
MOVING FROM LYING ON BACK TO SITTING ON SIDE OF FLAT BED WITH BEDRAILS: A LITTLE
DRESSING REGULAR UPPER BODY CLOTHING: A LOT
WALKING IN HOSPITAL ROOM: A LOT
HELP NEEDED FOR BATHING: TOTAL
TURNING FROM BACK TO SIDE WHILE IN FLAT BAD: A LITTLE
EATING MEALS: A LITTLE
TURNING FROM BACK TO SIDE WHILE IN FLAT BAD: A LITTLE
CLIMB 3 TO 5 STEPS WITH RAILING: TOTAL
MOVING TO AND FROM BED TO CHAIR: A LITTLE
EATING MEALS: A LITTLE
PERSONAL GROOMING: A LOT
MOBILITY SCORE: 13
TOILETING: A LOT
DRESSING REGULAR LOWER BODY CLOTHING: A LOT
DRESSING REGULAR UPPER BODY CLOTHING: A LOT
MOVING TO AND FROM BED TO CHAIR: A LOT
DRESSING REGULAR LOWER BODY CLOTHING: A LOT
TOILETING: A LOT
TOILETING: A LOT
EATING MEALS: A LITTLE
DAILY ACTIVITIY SCORE: 13
TURNING FROM BACK TO SIDE WHILE IN FLAT BAD: A LITTLE
MOBILITY SCORE: 14
CLIMB 3 TO 5 STEPS WITH RAILING: A LOT
DRESSING REGULAR LOWER BODY CLOTHING: TOTAL
WALKING IN HOSPITAL ROOM: A LOT
MOBILITY SCORE: 15
HELP NEEDED FOR BATHING: A LOT
STANDING UP FROM CHAIR USING ARMS: A LOT
PERSONAL GROOMING: A LITTLE
MOVING TO AND FROM BED TO CHAIR: A LITTLE
DAILY ACTIVITIY SCORE: 11
DAILY ACTIVITIY SCORE: 14
STANDING UP FROM CHAIR USING ARMS: A LOT
PERSONAL GROOMING: A LOT
HELP NEEDED FOR BATHING: A LOT
STANDING UP FROM CHAIR USING ARMS: A LOT
WALKING IN HOSPITAL ROOM: A LOT
CLIMB 3 TO 5 STEPS WITH RAILING: TOTAL
DRESSING REGULAR UPPER BODY CLOTHING: A LOT

## 2023-11-29 ASSESSMENT — PAIN SCALES - GENERAL
PAINLEVEL_OUTOF10: 0 - NO PAIN
PAINLEVEL_OUTOF10: 0 - NO PAIN
PAINLEVEL_OUTOF10: 5 - MODERATE PAIN
PAINLEVEL_OUTOF10: 0 - NO PAIN

## 2023-11-29 ASSESSMENT — PAIN - FUNCTIONAL ASSESSMENT
PAIN_FUNCTIONAL_ASSESSMENT: 0-10

## 2023-11-29 ASSESSMENT — ACTIVITIES OF DAILY LIVING (ADL)
BATHING_LEVEL_OF_ASSISTANCE: MAXIMUM ASSISTANCE
HOME_MANAGEMENT_TIME_ENTRY: 15

## 2023-11-29 NOTE — TREATMENT PLAN
Dr. Graham's consult reviewed.  She has chronic atrial fibrillation and ventricular rates have been elevated likely related to her active COVID infection.  Metoprolol was increased and ventricular rates are fairly well controlled.  She is on anticoagulation.  Her echocardiogram shows normal LV systolic function.  We will reevaluate and review the chart on Thursday.  No further cardiac testing is planned at this time.  Blood pressures remain elevated and if needed we can further titrate her dose of metoprolol.

## 2023-11-29 NOTE — PROGRESS NOTES
Patient admitted with COVID, weakness. Per nursing, patient is confused. PT/OT recommending MOD intensity therapy at discharge. Call to pt's dtr Alma ([phone]) to discuss. Left VM requesting return call.     12:30    11/29/23 1242   Discharge Planning   Living Arrangements Children   Support Systems Children   Assistance Needed patient requires assistance with all activity   Type of Residence Private residence   Home or Post Acute Services None   Patient expects to be discharged to: skilled nursing   Does the patient need discharge transport arranged? Yes   RoundTrip coordination needed? Yes   Patient Choice   Provider Choice list and CMS website (https://medicare.gov/care-compare#search) for post-acute Quality and Resource Measure Data were provided and reviewed with: Family;Patient     Met with patient and daughter Alma. Prior to sarai Covid, patient was independent at home and was driving herself to appointments and errands. Discussed SNF at discharge. Patient and dtr agreeable. A Care[employer] list was provided to patient and Alma, which they reviewed and selected facilities 1. [facility] and 2. [facility]. [place] was asked to submit referrals for review. Patient will not need a precert to admit, but does require two more inpatient midnights per Medicare.

## 2023-11-29 NOTE — CARE PLAN
Problem: Diabetes  Goal: Achieve decreasing blood glucose levels by end of shift  Outcome: Progressing  Goal: Increase stability of blood glucose readings by end of shift  Outcome: Progressing  Goal: Decrease in ketones present in urine by end of shift  Outcome: Progressing  Goal: Maintain electrolyte levels within acceptable range throughout shift  Outcome: Progressing  Goal: Maintain glucose levels >70mg/dl to <250mg/dl throughout shift  Outcome: Progressing  Goal: No changes in neurological exam by end of shift  Outcome: Progressing  Goal: Learn about and adhere to nutrition recommendations by end of shift  Outcome: Progressing  Goal: Vital signs within normal range for age by end of shift  Outcome: Progressing  Goal: Increase self care and/or family involovement by end of shift  Outcome: Progressing  Goal: Receive DSME education by end of shift  Outcome: Progressing     Problem: Pain - Adult  Goal: Verbalizes/displays adequate comfort level or baseline comfort level  Outcome: Progressing     Problem: Safety - Adult  Goal: Free from fall injury  Outcome: Progressing     Problem: Discharge Planning  Goal: Discharge to home or other facility with appropriate resources  Outcome: Progressing     Problem: Chronic Conditions and Co-morbidities  Goal: Patient's chronic conditions and co-morbidity symptoms are monitored and maintained or improved  Outcome: Progressing     Problem: Skin  Goal: Decreased wound size/increased tissue granulation at next dressing change  Outcome: Progressing  Goal: Participates in plan/prevention/treatment measures  Outcome: Progressing  Goal: Prevent/manage excess moisture  Outcome: Progressing  Goal: Prevent/minimize sheer/friction injuries  Outcome: Progressing  Goal: Promote/optimize nutrition  Outcome: Progressing  Goal: Promote skin healing  Outcome: Progressing   The patient's goals for the shift include      The clinical goals for the shift include free of falls    Over the shift,  the patient did not make progress toward the following goals. Barriers to progression include n/a. Recommendations to address these barriers include frequent patient room checks/sitter/bedalarm.

## 2023-11-29 NOTE — PROGRESS NOTES
Physical Therapy    Physical Therapy Treatment    Patient Name: Kaitlin Sarkar  MRN: [id number]  Today's Date: 11/29/2023  Time Calculation  Start Time: 1321 (Simultaneous filing. User may not have seen previous data.)  Stop Time: 1353 (Simultaneous filing. User may not have seen previous data.)  Time Calculation (min): 32 min (Simultaneous filing. User may not have seen previous data.)       Assessment/Plan   PT Assessment  PT Assessment Results: Decreased strength, Decreased range of motion, Decreased endurance, Decreased mobility, Decreased safety awareness  End of Session Communication: Bedside nurse  Assessment Comment: patient increased gait to 20 feet. patient  requries verbal and tactile cues for safety  End of Session Patient Position: Up in chair, Alarm on  PT Plan  Inpatient/Swing Bed or Outpatient: Inpatient  PT Plan  Treatment/Interventions: Bed mobility, Transfer training, Gait training, Strengthening  PT Plan: Skilled PT  PT Frequency: 4 times per week  PT Discharge Recommendations: Moderate intensity level of continued care  Equipment Recommended upon Discharge: Wheeled walker (TBD)  PT Recommended Transfer Status: Assist x2 (FWW)  PT - OK to Discharge: Yes (TO NEXT LOC WHEN MEDICALLY CLEARED)      General Visit Information:   PT  Visit  PT Received On: 11/29/23  Response to Previous Treatment: Patient reporting fatigue but able to participate.  General  Prior to Session Communication: Bedside nurse  Patient Position Received: Bed, 3 rail up, Alarm on  General Comment: patient slow to respond and very  rigid    Subjective   Precautions:     Vital Signs:       Objective   Pain:  Pain Assessment  Pain Assessment: 0-10  Pain Score: 0 - No pain (no complaints of pain)  Cognition:  Cognition  Orientation Level: Disoriented to time  Postural Control:     Extremity/Trunk Assessments:    Activity Tolerance:  Activity Tolerance  Endurance: Tolerates 30 min exercise with multiple rests  Treatments:  Therapeutic  Exercise  Therapeutic Exercise Performed: Yes  Therapeutic Exercise Activity 1: seated  marches, SAQ, ankle pumps, supine heelslides, hip abduction 13 reps each wiht mod assit to move through full ROM    Therapeutic Activity  Therapeutic Activity Performed: Yes  Therapeutic Activity 1: static sitting EOB, x 10 mins with min assit x2    Bed Mobility  Bed Mobility: Yes  Bed Mobility 1  Bed Mobility 1: Supine to sitting  Level of Assistance 1: Moderate assistance    Ambulation/Gait Training  Ambulation/Gait Training Performed: Yes  Ambulation/Gait Training 1  Surface 1: Level tile  Assistance 1: Moderate assistance (x2)  Comments/Distance (ft) 1: gait training  20 feet with mod assit x 2  and verbal cues  Transfers  Transfer: Yes    Outcome Measures:  [facility] Basic Mobility  Turning from your back to your side while in a flat bed without using bedrails: A little  Moving from lying on your back to sitting on the side of a flat bed without using bedrails: A little  Moving to and from bed to chair (including a wheelchair): A lot  Standing up from a chair using your arms (e.g. wheelchair or bedside chair): A lot  To walk in hospital room: A lot  Climbing 3-5 steps with railing: Total  Basic Mobility - Total Score: 13    Education Documentation  Precautions, taught by Angela Dodge PTA at 11/29/2023  2:14 PM.  Learner: Patient  Readiness: Acceptance  Method: Explanation  Response: Verbalizes Understanding    Mobility Training, taught by Angela Dodge PTA at 11/29/2023  2:14 PM.  Learner: Patient  Readiness: Acceptance  Method: Explanation  Response: Verbalizes Understanding    Education Comments  No comments found.        OP EDUCATION:       Encounter Problems       Encounter Problems (Active)       Mobility       STG - Patient will ambulate (Progressing)       Start:  11/28/23    Expected End:  12/12/23       FWW + FT         STRENGTHENING (Progressing)       Start:  11/28/23    Expected End:  12/19/23       20+  REPS RROM INCREASING STRENGTH TO STABILIZE OOB ACTIVITIES            Pain - Adult          Transfers       STG - Patient to transfer to and from sit to supine (Progressing)       Start:  11/28/23    Expected End:  12/12/23       MIN A NO RAILS         STG - Patient will transfer sit to and from stand (Progressing)       Start:  11/28/23    Expected End:  12/12/23       FWW CGA X1 A USIGN PROPER TECHNIQUE

## 2023-11-29 NOTE — PROGRESS NOTES
Notified by Emmie Martines RN [facility] that pt preference for snf is [facility] in [place].

## 2023-11-29 NOTE — PROGRESS NOTES
Kaitlin Sarkar is a 89 y.o. female on day 1 of admission presenting with Atrial fibrillation with rapid ventricular response (CMS/HCC).    Subjective   Interval History:   Reports feeling better  Daughter by bedside    Review of Systems    Objective   Range of Vitals (last 24 hours)  Heart Rate:  []   Temp:  [36.2 °C (97.1 °F)-36.6 °C (97.8 °F)]   Resp:  [18-23]   BP: (117-168)/()   SpO2:  [92 %-99 %]   Daily Weight  11/27/23 : 55.8 kg (123 lb)    Body mass index is 24.02 kg/m².    Physical Exam  General: AAO*3  Skin: no rashes  Neck: supple  CVS: S1S2  Chest:CTAB  GI: soft, non tender  : no CVAT  Psych: alert,oriented  CNS: no FND    Antibiotics  acetaminophen (Tylenol) tablet 650 mg  sodium chloride 0.9 % bolus 500 mL  metoprolol tartrate (Lopressor) injection 5 mg  metoprolol tartrate (Lopressor) tablet 25 mg  apixaban (Eliquis) tablet 2.5 mg  pantoprazole (ProtoNix) EC tablet 40 mg  pantoprazole (ProtoNix) injection 40 mg  melatonin tablet 3 mg  polyethylene glycol (Glycolax, Miralax) packet 17 g  bisacodyl (Dulcolax) EC tablet 10 mg  guaiFENesin (Mucinex) 12 hr tablet 600 mg  ondansetron (Zofran) tablet 4 mg  ondansetron (Zofran) injection 4 mg  acetaminophen (Tylenol) tablet 650 mg  perflutren protein A microsphere (Optison) injection 0.5 mL  dextrose 50 % injection 25 g  glucagon (Glucagen) injection 1 mg  dextrose 10 % in water (D10W) infusion  insulin lispro (HumaLOG) injection 0-10 Units  metoprolol tartrate (Lopressor) tablet 25 mg  metoprolol tartrate (Lopressor) injection 5 mg  perflutren protein A microsphere (Optison) injection  - Om[employer] Override Pull  metoprolol tartrate (Lopressor) tablet 50 mg  thyroid (pork) ([employer]) tablet 60 mg  nirmatrelvir-ritonavir (PAXLOVID) tablet therapy pack 2 tablet  metoprolol succinate XL (Toprol-XL) 24 hr tablet 100 mg  losartan (Cozaar) tablet 25 mg      Relevant Results  Labs  Results from last 72 hours   Lab Units 11/29/23  0342 11/27/23  4913  "11/27/23  0503   WBC AUTO x10*3/uL 6.5 10.8 3.7*   HEMOGLOBIN g/dL 14.8 15.9 12.7   HEMATOCRIT % 43.1 45.5 37.0   PLATELETS AUTO x10*3/uL 110* 127* 141*   NEUTROS PCT AUTO % 82.0 91.2 85.8   LYMPHS PCT AUTO % 9.8 3.5 6.4   MONOS PCT AUTO % 7.5 4.6 6.4   EOS PCT AUTO % 0.0 0.0 0.0     Results from last 72 hours   Lab Units 11/29/23  0342 11/27/23  2208 11/27/23  0551 11/27/23  0052   SODIUM mmol/L 133* 131* 133* 134*   POTASSIUM mmol/L 4.2 4.0 3.7 3.9   CHLORIDE mmol/L 99 91* 96* 98   CO2 mmol/L 21 29 28 25   BUN mg/dL 38* 20 21 20   CREATININE mg/dL 0.96 1.01 0.89 0.81   GLUCOSE mg/dL 78 177* 199* 134*   CALCIUM mg/dL 8.1* 8.3* 8.0* 8.6   ANION GAP mmol/L 17 15 13 15   EGFR mL/min/1.73m*2 55* 52* 61 68   PHOSPHORUS mg/dL  --   --   --  3.5     Results from last 72 hours   Lab Units 11/27/23  0052   ALK PHOS U/L 63   BILIRUBIN TOTAL mg/dL 0.7   PROTEIN TOTAL g/dL 7.0   ALT U/L 26   AST U/L 28   ALBUMIN g/dL 4.1     Estimated Creatinine Clearance: 28.7 mL/min (by C-G formula based on SCr of 0.96 mg/dL).  No results found for: \"CRP\"    Assessment/Plan     COVID-19  Atrial fibrillation   DM     Suggest:  Presenting with generalized weakness and reportedly also confusion   Follow blood cx  Chest x ray reviewed: patchy right nedial basilar opacties- from covid  Low procal and lymphopenia yesterday  more c/w viral infection  Observe off antibiotics   On paxlovid  Not hypoxic- appears comfortable on room air : no need for steroids  Trend wbc and temps     If clinically worsens, start vancomycin and cefepime    Freda Aldana MD  "

## 2023-11-29 NOTE — CARE PLAN
The patient's goals for the shift include      Problem: Diabetes  Goal: Achieve decreasing blood glucose levels by end of shift  Outcome: Progressing  Goal: Increase stability of blood glucose readings by end of shift  Outcome: Progressing  Goal: Decrease in ketones present in urine by end of shift  Outcome: Progressing  Goal: Maintain electrolyte levels within acceptable range throughout shift  Outcome: Progressing  Goal: Maintain glucose levels >70mg/dl to <250mg/dl throughout shift  Outcome: Progressing  Goal: No changes in neurological exam by end of shift  Outcome: Progressing  Goal: Learn about and adhere to nutrition recommendations by end of shift  Outcome: Progressing  Goal: Vital signs within normal range for age by end of shift  Outcome: Progressing  Goal: Increase self care and/or family involovement by end of shift  Outcome: Progressing  Goal: Receive DSME education by end of shift  Outcome: Progressing     Problem: Pain - Adult  Goal: Verbalizes/displays adequate comfort level or baseline comfort level  Outcome: Progressing     Problem: Safety - Adult  Goal: Free from fall injury  Outcome: Progressing     Problem: Discharge Planning  Goal: Discharge to home or other facility with appropriate resources  Outcome: Progressing     Problem: Chronic Conditions and Co-morbidities  Goal: Patient's chronic conditions and co-morbidity symptoms are monitored and maintained or improved  Outcome: Progressing     Problem: Skin  Goal: Decreased wound size/increased tissue granulation at next dressing change  Outcome: Progressing  Goal: Participates in plan/prevention/treatment measures  Outcome: Progressing  Flowsheets (Taken 11/29/2023 1506)  Participates in plan/prevention/treatment measures:   Elevate heels   Increase activity/out of bed for meals  Goal: Prevent/manage excess moisture  Outcome: Progressing  Flowsheets (Taken 11/29/2023 1498)  Prevent/manage excess moisture: Cleanse incontinence/protect with  barrier cream  Goal: Prevent/minimize sheer/friction injuries  Outcome: Progressing  Flowsheets (Taken 11/29/2023 1522)  Prevent/minimize sheer/friction injuries: Turn/reposition every 2 hours/use positioning/transfer devices  Goal: Promote/optimize nutrition  Outcome: Progressing  Goal: Promote skin healing  Outcome: Progressing       The clinical goals for the shift include free of falls    No s/s of distress noted. See assessment and mar. Remains on room air. See telemetry. See blood sugars. Up to chair today.

## 2023-11-29 NOTE — PROGRESS NOTES
Speech-Language Pathology Clinical Swallow Evaluation    Patient Name: Kaitlin Sarkar  MRN: [id number]  : 10/18/1930  Today's Date: 23  Start time: Start Time: 1129  Stop time: Stop Time: 1213  Time calculation (min) : Time Calculation (min): 44 min    PMHx: s/f HTN, chronic atrial fibrillation on Eliquis, T2DM, and     Chief Complaint:  admission for generalized weakness/COVID-19/confusion (-) at [place], presenting with worsening confusion and worsening generalized weakness.      Relevant imaging results:  Chest x-ray IMPRESSION:  1. Patchy right medial basilar airspace opacities. Pneumonia is not  excluded.  2. Enlargement of the cardiac silhouette.    PLAN  Recommendations:  Solid consistency: Soft/bite-sized  Liquid consistency: Mildly thick (IDDSI 2) / Nectar-thick  Medication administration:  as determined by nurse  Compensatory swallow strategies: Upright positioning for all PO intake,  Remain upright for >30 min after meals,  Slow rate of intake,  Alternate bites and sips    Recommended frequency/duration:  Skilled SLP services recommended: Yes  Frequency: 2x/week  Duration: 2 weeks  Discharge recommendation: Recommend LOW intensity ST services to ___    Goals:  1. Pt will consume prescribed diet (current diet is Soft bite-sized diet and NECTAR Thick Liquids ) without overt s/sx aspiration/penetration >95% of the time.  2. Pt will consume trials of upgraded textures without overt s/sx aspiration in order for consideration of diet texture upgrade.      SUBJECTIVE  SLP Received On: 23  Patient Class: Inpatient  Living Environment: Live with __ (family)  Ordering Physician: Dr. Cedillo  Reason for Referral: suspect oropharygneal dysphagia  Prior to Session Communication: Bedside nurse  RN cleared pt to participate in session and reported that pt had taken her pills with thin water one at a time and only coughed after the final pill.    Date of Onset: 23  Date of Order:  11/27/23  [employer] Diet: Regular diet and Thin Liquids  Current Diet : NPO until swallow eval by SLP    Pain Assessment  Pain Assessment: 0-10  Pain Score: 5 - Moderate pain (sore throat)    Behavior/Cognition: Alert, Cooperative, Pleasant mood  Orientation: Oriented to self and Oriented to location  Respiratory Status: Room air  Baseline Vocal Quality:  (soft)  Volitional Cough: Weak  Volitional Swallow: Delayed  Patient positioning: Upright in bed      OBJECTIVE  Clinical swallow evaluation completed and consisted of interview, oral motor assessment, and PO trials (2 sips of Thin liquid via cup, 4 oz of NTL via cup - single sips, 4 oz of applesauce - level tsp bites and 4 oz of cubed pears drained with 4 to 5 cubes per spoonful).    ORAL PHASE: Pt wearing upper and lower dentures. Oral mucosa were pink, moist, and free of obvious lesions. Lingual strength and ROM were WFL. Labial strength/ROM were WFL.  Labial seal was adequate. Mastication of soft solids was WFL. A/P transit was slow as pt demonstrated oral holding with purees, solids and liquids.  Suspect apprehension with swallow d/t c/o a sore throat.  (Dtr report oral holding not noted prior to admission. Pt would complete piecemeal deglutition and would put more soft solids in her mouth with food still present.  Pt instructed to swallow entire bolus prior to taking the next bite/sip.   Due to the oral holding with all consistencies suspect premature pharyngeal entry.    PHARYNGEAL PHASE: Suspect incoordination with swallow trigger for thin liquids as pt did cough 2/2 trials of thin liquids.  Swallow onset appeared within functional limits for all other consistencies.  Laryngeal elevation appeared adequate to visualization and palpation. No immediate or delayed s/sx aspiration/penetration were observed with puree, nectar and soft solid consistencies.      ASSESSMENT  Impressions:  Pt c/o a sore throat and oral holding noted with all consistencies.  Suspect  premature pharyngeal entry with thin with noted coughing after the swallow.     Prognosis: Good    Treatment/Education:  Results and recommendations were relayed to: Patient, Bedside nurse, and Physician  Education provided: Yes   Learner: Patient and Child   Barriers to learning: Acuteness of illness barrier   Method of teaching: Verbal   Topic: Role of ST, results of assessment, risk for aspiration, recommended diet modifications, recommendation for MBSS, recommended safe swallow strategies, and recommendation for dysphagia follow-up   Outcome of teaching: Caregiver demonstrated good understanding Pt demonstrated partial understanding Education will be reinforced during follow-up visits, as appropriate  Treatment provided: No  Next Treatment Priority: reassess swallow function in order to determine if safe to advance back to baseline of regular texture and thin liquids.

## 2023-11-29 NOTE — PROGRESS NOTES
Kaitlin Sarkar is a 89 y.o. female on day 1 of admission presenting with Atrial fibrillation with rapid ventricular response (CMS/HCC).    Subjective   Kaitlin Irwin is a 89 y.o. female presenting with the above.  The patient presents from home with her daughter who states that she has been crying and very weak.  She brought her to the hospital where she was found to be also very confused, and her daughter states that this is not her norm.  At present the patient is only oriented to person, and unable to contribute to this history.  The patient does have chronic atrial fibrillation and is on a blood thinner.  We are unsure what this blood thinner is at this point.  She also has type 2 diabetes mellitus which according to the ER physician is being diet controlled.  The patient does not appear to be in any distress at this time.  She presents for further evaluation.     Past Medical History  Chronic atrial fibrillation  Type 2 diabetes mellitus     Surgical History  Currently unobtainable from the patient due to her altered mental status.     Allergies  Penicillin     Social History  She reports that she has never smoked. She does not have any smokeless tobacco history on file. She reports that she does not drink alcohol and does not use drugs.     Family History  Currently unavailable from the patient due to her altered mental status.     Current Medication  azithromycin, 500 mg, intravenous, Once  magnesium sulfate, 2 g, intravenous, Once     Patient does take home medications, but currently we are unable to locate these.  This needs to be ascertained by the attending physician in the morning.     11/27: Patient is weak but not hypoxic.  Patient and daughter had talked and since patient is just sitting in the ER not having much done they feel they can do just as well if not better by taking her home.  We will have her discharged home with some home health care at this time.  Turn to ER if symptoms worsen.     11/28:  Kaitlin Sarkar is a 89 y.o. female with PMHx s/f HTN, chronic atrial fibrillation on Eliquis, T2DM, and admission for generalized weakness/COVID-19/confusion (11/26-11/27) at [place], presenting with worsening confusion and worsening generalized weakness.  Patient was discharged on 11/27/2023 following a conversation between the patient's family and the inpatient physician, at that time family was feeling that due to the patient boarding in the ER, she would be better cared for at home.  After discharge, the patient went back to her house and family was unable to get her out of the car due to her worsening weakness.  They also report that her mentation worsened during this time even since discharge from the ED, to where she is more confused and not as alert.  Patient does not provide any information at this time due to her encephalopathy in setting of COVID-19 most likely also it is unclear if she has a history of dementia.  Upon my review interview with the patient she would not respond to any commands or answer any questions and nursing staff had noted that this is how she has been since arrival.  Nursing staff noted that she will awaken to voice but not following commands which is what my assessment had established.  I did attempt to call the patient's daughter but this went to voicemail.  I was unable to complete any information regarding the patient's histories, review of systems or history of present illness at this time.  While in the ED she was noted to be in atrial fibrillation with rapid ventricular response with the highest documented rate at 118 at which time she received a one-time dose of 5 mg IV Lopressor and then a one-time dose of 25 mg p.o. Lopressor.     ED Course (Summary):   Vitals on presentation: T98.8, /120 --> 112/77,  --> 90, RR 20, SpO2 95% RA   CBC with differential: WBC 10.8, Hb 15.9, platelets 127  Chemistry panel: Glucose 177, sodium 131, testing 4.0, BUN 20, serum creatinine  1.01, mag 1.71  High-sensitivity troponin 39-repeat pending.  ECG initially atrial fibrillation with RVR which improved to heart rate of 90 following IV metoprolol.  Imaging and results from 11/26/2023-11/27/2023 reviewed.  CXR was with a patchy right basilar airspace opacity with pneumonia not been excluded (ID consultation also reviewed, given negative procal and no fever decision was made to monitor off antibiotics).  CT head was consistent with chronic changes, nothing acute.  COVID was positive days ago now.     ED Course:      ED Course as of 11/28/23 0030   Mon Nov 27, 2023   2359 Patient received 5 mg of metoprolol and blood pressure is 112/77 systolically and heart rate is 90 she is no longer in RVR.  Will give oral metoprolol 25 mg since I am unsure of what her actual dosages. [CF]       11/28: Addendum patient resting comfortably heart rate is relatively well controlled feel she would do fine on a medical floor with telemetry.  Cardiology recommends increasing metoprolol for rate control continue apixaban.  At this time have restarted patient's Paxlovid.  Okay to go to med telemetry patient states she feels better already.  Work on PT and OT with supportive care.    11/29: Patient's heart rate this morning elevated prior to getting her Lopressor orally.  Patient moving better with therapy no shortness of breath just malaise.  Continue to work on getting her beta-blocker titrated back up.  Patient had missed several doses of her metoprolol  mg a day prior to coming in due to illness and being stuck in the ER.  Continue with conservative care finishing up Paxlovid PT OT discharge planning skilled nursing facility for rehab.  We will check labs in a.m. patient to be seen by my associate  tomorrow    Objective     Physical Exam  Constitutional:       Appearance: Normal appearance.   HENT:      Head: Normocephalic.      Right Ear: External ear normal.      Left Ear: External ear normal.       Nose: Rhinorrhea present.      Mouth/Throat:      Mouth: Mucous membranes are moist.   Eyes:      Extraocular Movements: Extraocular movements intact.      Conjunctiva/sclera: Conjunctivae normal.      Pupils: Pupils are equal, round, and reactive to light.   Cardiovascular:      Rate and Rhythm: Normal rate. Rhythm irregular.      Heart sounds: No murmur heard.  Pulmonary:      Effort: Pulmonary effort is normal.      Breath sounds: Normal breath sounds.   Abdominal:      General: Abdomen is flat.      Palpations: Abdomen is soft.   Musculoskeletal:      Cervical back: Normal range of motion.      Left lower leg: Edema present.      Comments: Significant kyphosis of the spine   Skin:     General: Skin is warm.   Neurological:      General: No focal deficit present.      Mental Status: She is alert.      Comments: General frailty and weakness.   Psychiatric:         Mood and Affect: Mood normal.     Patient is pleasant states she feels better    Last Recorded Vitals  Blood pressure 136/87, pulse 84, temperature 36.2 °C (97.1 °F), temperature source Temporal, resp. rate 18, height 1.524 m (5'), weight 55.8 kg (123 lb), SpO2 92 %.  Intake/Output last 3 Shifts:  I/O last 3 completed shifts:  In: 700 (12.5 mL/kg) [P.O.:200; IV Piggyback:500]  Out: 200 (3.6 mL/kg) [Urine:200 (0.1 mL/kg/hr)]  Weight: 55.8 kg     Relevant Results                  Scheduled medications  apixaban, 2.5 mg, oral, BID  insulin lispro, 0-10 Units, subcutaneous, Before meals & nightly  losartan, 25 mg, oral, Daily  melatonin, 3 mg, oral, Daily  metoprolol succinate XL, 100 mg, oral, Daily  metoprolol tartrate, 50 mg, oral, BID  nirmatrelvir-ritonavir, 2 tablet, oral, BID  pantoprazole, 40 mg, oral, Daily before breakfast   Or  pantoprazole, 40 mg, intravenous, Daily before breakfast  polyethylene glycol, 17 g, oral, Daily  thyroid (pork), 60 mg, oral, TID      Continuous medications     PRN medications  PRN medications: acetaminophen, bisacodyl,  dextrose 10 % in water (D10W), dextrose, glucagon, guaiFENesin, metoprolol, ondansetron **OR** ondansetron             Assessment/Plan   Principal Problem:    Atrial fibrillation with rapid ventricular response (CMS/HCC)  Active Problems:    Pneumonia due to COVID-19 virus    Type 2 diabetes mellitus (CMS/HCC)    Generalized weakness    Acute metabolic encephalopathy         Admit to med telemetry  On apixaban 2.5 mg twice daily  Metoprolol 50 mg twice a day  Metoprolol  mg daily on hold/home med  Losartan restarted.  Melatonin as needed  Low-dose sliding scale insulin  Blossom Thyroid 60 mg daily  Paxlovid pack  Continue to monitor on medical floor with telemetry  PT and OT  Increase activity  Monitor oxygen requirements  Discharge planning  See orders for complete plan  Check labs in a.m.  See orders for complete plan    I spent 40 minutes in the professional and overall care of this patient.      Crow Flores MD

## 2023-11-29 NOTE — PROGRESS NOTES
Occupational Therapy    OT Treatment    Patient Name: Kaitlin Sarkar  MRN: [id number]  Today's Date: 11/29/2023            Assessment:  Evaluation/Treatment Tolerance: Patient limited by fatigue  Medical Staff Made Aware: Yes  End of Session Communication: PCT/NA/CTA  End of Session Patient Position: Alarm on, Up in chair  OT Assessment Results: Decreased ADL status, Decreased upper extremity strength, Decreased safe judgment during ADL, Decreased endurance, Decreased functional mobility  Evaluation/Treatment Tolerance: Patient limited by fatigue  Medical Staff Made Aware: Yes    Plan:  Treatment Interventions: ADL retraining, Functional transfer training, UE strengthening/ROM  Treatment Interventions: ADL retraining, Functional transfer training, UE strengthening/ROM  Subjective: Pt very confused      Pain:  Pain Assessment  Pain Assessment: 0-10  Pain Score: 0 - No pain  Objective      Activities of Daily Living:          LE Bathing  LE Bathing Level of Assistance: Maximum assistance  LE Bathing Where Assessed:  (Standing in front of chair)     LE Dressing  LE Dressing: Yes  Adult Briefs Level of Assistance: Maximum assistance, Maximum verbal cues  LE Dressing Where Assessed:  (Standing in front of chair)       Functional Standing Tolerance:  Time: Pt static standing x 3 trials  Activity: Steffanie care  Functional Standing Tolerance Comments: pt stood approx 2 min each (Fatigued with retrolean)    Bed Mobility/Transfers: Bed Mobility  Bed Mobility: Yes  Bed Mobility 2  Bed Mobility  2: Supine to sitting  Level of Assistance 2: Moderate assistance, Moderate verbal cues  Bed Mobility Comments 2: Assist of 2  Transfers  Transfer: Yes  Transfer 1  Technique 1: Sit to stand, Stand to sit  Transfer Level of Assistance 1: Arm in arm assistance, Moderate assistance, Moderate verbal cues, +2             Therapy/Activity: Therapeutic Exercise  Therapeutic Exercise Performed: Yes  Therapeutic Exercise Activity 1: seated AROM BUE  x 10 reps shldr flex ext, chest presses, half arm abduction and elbowe flex ext  Therapeutic Activity  Therapeutic Activity Performed: Yes  Therapeutic Activity 1: Static standing        Strength: decreased strength and stiffness in BUEs      Outcome Measures:[facility] Daily Activity  Putting on and taking off regular lower body clothing: Total  Bathing (including washing, rinsing, drying): Total  Putting on and taking off regular upper body clothing: A lot  Toileting, which includes using toilet, bedpan or urinal: A lot  Taking care of personal grooming such as brushing teeth: A lot  Eating Meals: A little  Daily Activity - Total Score: 11     Encounter Problems (Active)       ADLs       Patient with complete lower body dressing with modified independent level of assistance donning and doffing all LE clothes  with PRN adaptive equipment while supported sitting and standing (Progressing)       Start:  11/28/23    Expected End:  12/12/23            Patient will complete daily grooming tasks brushing teeth and washing face/hair with modified independent level of assistance and PRN adaptive equipment while supported sitting and standing. (Progressing)       Start:  11/28/23    Expected End:  12/12/23               TRANSFERS       Patient will complete functional transfers and functional mobility with least restrictive device with supervision level of assistance. (Progressing)       Start:  11/28/23    Expected End:  12/12/23                     Goals:

## 2023-11-30 LAB
ANION GAP SERPL CALC-SCNC: 20 MMOL/L (ref 10–20)
BASOPHILS # BLD AUTO: 0.01 X10*3/UL (ref 0–0.1)
BASOPHILS NFR BLD AUTO: 0.2 %
BUN SERPL-MCNC: 50 MG/DL (ref 6–23)
CALCIUM SERPL-MCNC: 7.9 MG/DL (ref 8.6–10.3)
CHLORIDE SERPL-SCNC: 97 MMOL/L (ref 98–107)
CO2 SERPL-SCNC: 24 MMOL/L (ref 21–32)
CREAT SERPL-MCNC: 1.35 MG/DL (ref 0.5–1.05)
CRP SERPL-MCNC: 2.26 MG/DL
EOSINOPHIL # BLD AUTO: 0 X10*3/UL (ref 0–0.4)
EOSINOPHIL NFR BLD AUTO: 0 %
ERYTHROCYTE [DISTWIDTH] IN BLOOD BY AUTOMATED COUNT: 13.5 % (ref 11.5–14.5)
GFR SERPL CREATININE-BSD FRML MDRD: 37 ML/MIN/1.73M*2
GLUCOSE BLD MANUAL STRIP-MCNC: 101 MG/DL (ref 74–99)
GLUCOSE BLD MANUAL STRIP-MCNC: 111 MG/DL (ref 74–99)
GLUCOSE BLD MANUAL STRIP-MCNC: 111 MG/DL (ref 74–99)
GLUCOSE SERPL-MCNC: 70 MG/DL (ref 74–99)
HCT VFR BLD AUTO: 41.9 % (ref 36–46)
HGB BLD-MCNC: 14.2 G/DL (ref 12–16)
IMM GRANULOCYTES # BLD AUTO: 0.02 X10*3/UL (ref 0–0.5)
IMM GRANULOCYTES NFR BLD AUTO: 0.5 % (ref 0–0.9)
LYMPHOCYTES # BLD AUTO: 0.96 X10*3/UL (ref 0.8–3)
LYMPHOCYTES NFR BLD AUTO: 22.2 %
MAGNESIUM SERPL-MCNC: 2.3 MG/DL (ref 1.6–2.4)
MCH RBC QN AUTO: 33.7 PG (ref 26–34)
MCHC RBC AUTO-ENTMCNC: 33.9 G/DL (ref 32–36)
MCV RBC AUTO: 100 FL (ref 80–100)
MONOCYTES # BLD AUTO: 0.28 X10*3/UL (ref 0.05–0.8)
MONOCYTES NFR BLD AUTO: 6.5 %
NEUTROPHILS # BLD AUTO: 3.06 X10*3/UL (ref 1.6–5.5)
NEUTROPHILS NFR BLD AUTO: 70.6 %
NRBC BLD-RTO: 0 /100 WBCS (ref 0–0)
PLATELET # BLD AUTO: 111 X10*3/UL (ref 150–450)
POTASSIUM SERPL-SCNC: 4.7 MMOL/L (ref 3.5–5.3)
RBC # BLD AUTO: 4.21 X10*6/UL (ref 4–5.2)
SODIUM SERPL-SCNC: 136 MMOL/L (ref 136–145)
STAPHYLOCOCCUS SPEC CULT: NORMAL
WBC # BLD AUTO: 4.3 X10*3/UL (ref 4.4–11.3)

## 2023-11-30 PROCEDURE — 99233 SBSQ HOSP IP/OBS HIGH 50: CPT | Performed by: INTERNAL MEDICINE

## 2023-11-30 PROCEDURE — 1200000002 HC GENERAL ROOM WITH TELEMETRY DAILY

## 2023-11-30 PROCEDURE — 36415 COLL VENOUS BLD VENIPUNCTURE: CPT | Performed by: INTERNAL MEDICINE

## 2023-11-30 PROCEDURE — 2500000001 HC RX 250 WO HCPCS SELF ADMINISTERED DRUGS (ALT 637 FOR MEDICARE OP): Performed by: STUDENT IN AN ORGANIZED HEALTH CARE EDUCATION/TRAINING PROGRAM

## 2023-11-30 PROCEDURE — 92526 ORAL FUNCTION THERAPY: CPT | Mod: GN | Performed by: SPEECH-LANGUAGE PATHOLOGIST

## 2023-11-30 PROCEDURE — 2500000001 HC RX 250 WO HCPCS SELF ADMINISTERED DRUGS (ALT 637 FOR MEDICARE OP): Performed by: INTERNAL MEDICINE

## 2023-11-30 PROCEDURE — 97110 THERAPEUTIC EXERCISES: CPT | Mod: GP,CQ

## 2023-11-30 PROCEDURE — 2500000004 HC RX 250 GENERAL PHARMACY W/ HCPCS (ALT 636 FOR OP/ED): Performed by: STUDENT IN AN ORGANIZED HEALTH CARE EDUCATION/TRAINING PROGRAM

## 2023-11-30 PROCEDURE — 97116 GAIT TRAINING THERAPY: CPT | Mod: GP,CQ

## 2023-11-30 PROCEDURE — 80048 BASIC METABOLIC PNL TOTAL CA: CPT | Performed by: INTERNAL MEDICINE

## 2023-11-30 PROCEDURE — 86140 C-REACTIVE PROTEIN: CPT | Performed by: INTERNAL MEDICINE

## 2023-11-30 PROCEDURE — 85025 COMPLETE CBC W/AUTO DIFF WBC: CPT | Performed by: INTERNAL MEDICINE

## 2023-11-30 PROCEDURE — 83735 ASSAY OF MAGNESIUM: CPT | Performed by: INTERNAL MEDICINE

## 2023-11-30 PROCEDURE — 82947 ASSAY GLUCOSE BLOOD QUANT: CPT

## 2023-11-30 RX ADMIN — LEVOTHYROXINE, LIOTHYRONINE 60 MG: 38; 9 TABLET ORAL at 11:06

## 2023-11-30 RX ADMIN — LEVOTHYROXINE, LIOTHYRONINE 60 MG: 38; 9 TABLET ORAL at 17:35

## 2023-11-30 RX ADMIN — ONDANSETRON 4 MG: 2 INJECTION INTRAMUSCULAR; INTRAVENOUS at 13:31

## 2023-11-30 RX ADMIN — APIXABAN 2.5 MG: 5 TABLET, FILM COATED ORAL at 20:00

## 2023-11-30 RX ADMIN — LEVOTHYROXINE, LIOTHYRONINE 60 MG: 38; 9 TABLET ORAL at 20:00

## 2023-11-30 RX ADMIN — METOPROLOL TARTRATE 50 MG: 50 TABLET, FILM COATED ORAL at 20:00

## 2023-11-30 RX ADMIN — APIXABAN 2.5 MG: 5 TABLET, FILM COATED ORAL at 11:06

## 2023-11-30 RX ADMIN — Medication 3 MG: at 20:00

## 2023-11-30 RX ADMIN — PANTOPRAZOLE SODIUM 40 MG: 40 TABLET, DELAYED RELEASE ORAL at 06:02

## 2023-11-30 RX ADMIN — LOSARTAN POTASSIUM 25 MG: 25 TABLET, FILM COATED ORAL at 11:07

## 2023-11-30 RX ADMIN — METOPROLOL SUCCINATE 100 MG: 50 TABLET, EXTENDED RELEASE ORAL at 11:06

## 2023-11-30 ASSESSMENT — PAIN SCALES - GENERAL
PAINLEVEL_OUTOF10: 0 - NO PAIN

## 2023-11-30 ASSESSMENT — COGNITIVE AND FUNCTIONAL STATUS - GENERAL
TOILETING: A LOT
DRESSING REGULAR LOWER BODY CLOTHING: A LITTLE
STANDING UP FROM CHAIR USING ARMS: A LITTLE
HELP NEEDED FOR BATHING: A LITTLE
DRESSING REGULAR LOWER BODY CLOTHING: A LITTLE
TOILETING: A LOT
HELP NEEDED FOR BATHING: A LITTLE
STANDING UP FROM CHAIR USING ARMS: A LITTLE
PERSONAL GROOMING: A LITTLE
TURNING FROM BACK TO SIDE WHILE IN FLAT BAD: A LITTLE
DRESSING REGULAR UPPER BODY CLOTHING: A LITTLE
CLIMB 3 TO 5 STEPS WITH RAILING: A LOT
EATING MEALS: A LITTLE
PERSONAL GROOMING: A LITTLE
MOBILITY SCORE: 15
MOBILITY SCORE: 17
MOVING FROM LYING ON BACK TO SITTING ON SIDE OF FLAT BED WITH BEDRAILS: A LITTLE
TURNING FROM BACK TO SIDE WHILE IN FLAT BAD: A LITTLE
MOVING FROM LYING ON BACK TO SITTING ON SIDE OF FLAT BED WITH BEDRAILS: A LITTLE
CLIMB 3 TO 5 STEPS WITH RAILING: TOTAL
DRESSING REGULAR UPPER BODY CLOTHING: A LITTLE
MOVING FROM LYING ON BACK TO SITTING ON SIDE OF FLAT BED WITH BEDRAILS: A LITTLE
MOVING TO AND FROM BED TO CHAIR: A LITTLE
EATING MEALS: A LITTLE
WALKING IN HOSPITAL ROOM: A LITTLE
CLIMB 3 TO 5 STEPS WITH RAILING: A LOT
DAILY ACTIVITIY SCORE: 17
MOVING TO AND FROM BED TO CHAIR: A LITTLE
TURNING FROM BACK TO SIDE WHILE IN FLAT BAD: A LITTLE
WALKING IN HOSPITAL ROOM: A LOT
STANDING UP FROM CHAIR USING ARMS: A LITTLE
WALKING IN HOSPITAL ROOM: A LITTLE
MOVING TO AND FROM BED TO CHAIR: A LITTLE

## 2023-11-30 ASSESSMENT — PAIN - FUNCTIONAL ASSESSMENT: PAIN_FUNCTIONAL_ASSESSMENT: 0-10

## 2023-11-30 NOTE — PROGRESS NOTES
Notified by Emmie Martines RN TCC that pt preference for SNF is [facility]- Referral to be sent

## 2023-11-30 NOTE — PROGRESS NOTES
Voice mail received from pt's dtr Alma, requesting a referral to [facility], [place]. PCN Christel was requested to send referral.

## 2023-11-30 NOTE — PROGRESS NOTES
Speech-Language Pathology    Inpatient  Speech-Language Pathology Treatment     Patient Name: Kaitlin Sarkar  MRN: [id number]  Today's Date: 11/30/2023  Time Calculation  Start Time: 1616  Stop Time: 1730  Time Calculation (min): 74 min         Current Problem:   1. Generalized weakness        2. Pneumonia due to COVID-19 virus        3. Atrial fibrillation with RVR (CMS/HCC)        4. Atrial fibrillation with rapid ventricular response (CMS/HCC)        5. Chronic atrial fibrillation (CMS/HCC)  Transthoracic Echo (TTE) Complete    Transthoracic Echo (TTE) Complete      6. Oropharyngeal dysphagia [R13.12]              SLP Assessment:  SLP TX Intervention Outcome: Making Progress Towards Goals  SLP Assessment Results: Cognitive Deficits  Prognosis: Good  Treatment Provided: Yes  Treatment Tolerance: Patient tolerated treatment well  Medical Staff Made Aware: Yes  Education Provided: Yes       Plan:  Treatment/Interventions:  (PO trials.)  SLP TX Plan: Continue Plan of Care  SLP Plan: Skilled SLP  SLP Frequency: 2x per week  Duration: 2 weeks  SLP Discharge Recommendations: Continue skilled SLP services at the next level of care  Next Treatment Priority: continue to assess swallow function for regular textures and thin liquids as well as utilize strategies of slow rate and clearing oral cavity prior to taking the next bite.   Discussed POC: Patient, Nursing, Physician  Discussed Risks/Benefits: Yes, Patient, Nursing, Physician  Patient/Caregiver Agreeable: Yes  SLP - OK to Discharge: Yes (when medically stable as determined by physician.)      Goals:  1. Pt will consume prescribed diet (current diet is Soft bite-sized diet and THIN ) without overt s/sx aspiration/penetration >95% of the time.  2. Pt will consume trials of upgraded textures without overt s/sx aspiration in order for consideration of diet texture upgrade.      Subjective   Current Problem:  Pt is awake alert and sitting up in bed.  She is agreeable to  swallowing reassessment.     She reports that her throat is not as sore today and she can swallow cold liquids without it irritating her throat.     Per the RN pts dtr in room this am and giving pt thin liquids.     SLP notified RN that pt having some memory issues and increased distractibility during session.  SLP had to remind pt 3x what the food items were on her tray as well as the two types of liquid beverages.      Most Recent Visit:  SLP Received On: 11/30/23    General Visit Information:   Prior to Session Communication: Bedside nurse      Pain Assessment:   Pain Assessment: 0-10  Pain Score: 0 - No pain      Objective   Therapeutic Swallow:  Therapeutic Swallow Intervention : PO Trials  Solid Diet Recommendations: Soft & bite sized/chopped (IDDSI Level 6)  Liquid Diet Recommendations: Thin (IDDSI Level 0)  Swallow Comments: Pt fed herself.  She consumed 3 oz of water via cup and straw and 1/2 a eddie cracker.  Then her dinner tray arrived.  PCA and SLP got pt up to a chair for dinner meal after PCA took blood sugar and vitals.   Pt received a soft bite-sized meal tray and SLP had pt consume thin water. Pt fed herself.  Pt ate 75% of the fruit and veggy on her tray and 50% of the cut up meatloaf.     Oral Phase: Pt had increased oral manipulation and mastication with liquids and solids.  Slow oral transit.  Pt loading mouth with next bite before completing the previous bite.  Piecemeal deglutition noted.  Pt had intermittent oral residues on anterior tongue after final swallow.  Eventually oral residues were cleared at end of meal with dry swallows and liquid wash.    Pharyngeal phase: continue to suspect premature pharyngeal entry d/t oral incoordination with suspect delay in the swallow trigger.  The pt had no overt coughing or choking episodes with oral trials that were suspicious for suspected penetration and or aspiration.      Pt has Covid-19 and pneumonia.  Pt had intermittent cough prior to swallow  reassessment.  Yesterday pt coughed on all trials of thin liquid.  Today no coughing on thin liquids.      Will advance liquids to thin consistency.    Continue modified diet of Soft bite-sized diet and THIN Liquids due to pt's impulsivity of loading her mouth with multiple bites of food.   Pt was receptive to cues but needed them frequently.  Continue SLP services to educate and train pt to slow down rate and clear mouth before taking the next bite / sip in order to be able to advance diet.      Inpatient Education:  Adult Inpatient Education  Individual(s) Educated: Patient (RN and physician)  Verbal Education : advance liquids to thin.  Swallow bite of food before taking then next bite, alternate solids with liquids. sit up for all meals.  SLP will continue  to follow.  Risk and Benefits Discussed with Patient/Caregiver/Other: yes  Patient/Caregiver Demonstrated Understanding: yes  Plan of Care Discussed and Agreed Upon: yes  Patient Response to Education: Patient/Caregiver Verbalized Understanding of Information  Education Comment: Pt appears to be demonstrating some memory issues and forgetfulness therefore will need reeducation and reinforcement of precautions.  RN notified.

## 2023-11-30 NOTE — CARE PLAN
Problem: Diabetes  Goal: Achieve decreasing blood glucose levels by end of shift  Outcome: Progressing  Goal: Increase stability of blood glucose readings by end of shift  Outcome: Progressing  Goal: Decrease in ketones present in urine by end of shift  Outcome: Progressing  Goal: Maintain electrolyte levels within acceptable range throughout shift  Outcome: Progressing  Goal: Maintain glucose levels >70mg/dl to <250mg/dl throughout shift  Outcome: Progressing  Goal: No changes in neurological exam by end of shift  Outcome: Progressing  Goal: Learn about and adhere to nutrition recommendations by end of shift  Outcome: Progressing  Goal: Vital signs within normal range for age by end of shift  Outcome: Progressing  Goal: Increase self care and/or family involovement by end of shift  Outcome: Progressing  Goal: Receive DSME education by end of shift  Outcome: Progressing     Problem: Pain - Adult  Goal: Verbalizes/displays adequate comfort level or baseline comfort level  Outcome: Progressing     Problem: Safety - Adult  Goal: Free from fall injury  Outcome: Progressing     Problem: Discharge Planning  Goal: Discharge to home or other facility with appropriate resources  Outcome: Progressing     Problem: Chronic Conditions and Co-morbidities  Goal: Patient's chronic conditions and co-morbidity symptoms are monitored and maintained or improved  Outcome: Progressing     Problem: Skin  Goal: Decreased wound size/increased tissue granulation at next dressing change  Outcome: Progressing  Goal: Participates in plan/prevention/treatment measures  Outcome: Progressing  Goal: Prevent/manage excess moisture  Outcome: Progressing  Goal: Prevent/minimize sheer/friction injuries  Outcome: Progressing  Goal: Promote/optimize nutrition  Outcome: Progressing  Goal: Promote skin healing  Outcome: Progressing   The patient's goals for the shift include      The clinical goals for the shift include pt will maintain free of falls  this shift    Over the shift, the patient did not make progress toward the following goals. Barriers to progression include n/a. Recommendations to address these barriers include q2 patient checks/bed alarm.

## 2023-11-30 NOTE — PROGRESS NOTES
Kaitlin Sarkar is a 89 y.o. female on day 2 of admission presenting with Atrial fibrillation with rapid ventricular response (CMS/HCC).    Subjective   Kaitlin Iriwn is a 89 y.o. female presenting with the above.  The patient presents from home with her daughter who states that she has been crying and very weak.  She brought her to the hospital where she was found to be also very confused, and her daughter states that this is not her norm.  At present the patient is only oriented to person, and unable to contribute to this history.  The patient does have chronic atrial fibrillation and is on a blood thinner.  We are unsure what this blood thinner is at this point.  She also has type 2 diabetes mellitus which according to the ER physician is being diet controlled.  The patient does not appear to be in any distress at this time.  She presents for further evaluation.     11/27: Patient is weak but not hypoxic.  Patient and daughter had talked and since patient is just sitting in the ER not having much done they feel they can do just as well if not better by taking her home.  We will have her discharged home with some home health care at this time.  Turn to ER if symptoms worsen.     11/28: Kaitlin Sarkar is a 89 y.o. female with PMHx s/f HTN, chronic atrial fibrillation on Eliquis, T2DM, and admission for generalized weakness/COVID-19/confusion (11/26-11/27) at [facility], presenting with worsening confusion and worsening generalized weakness.  Patient was discharged on 11/27/2023 following a conversation between the patient's family and the inpatient physician, at that time family was feeling that due to the patient boarding in the ER, she would be better cared for at home.  After discharge, the patient went back to her house and family was unable to get her out of the car due to her worsening weakness.  They also report that her mentation worsened during this time even since discharge from the ED, to where she is more  confused and not as alert.  Patient does not provide any information at this time due to her encephalopathy in setting of COVID-19 most likely also it is unclear if she has a history of dementia.  Upon my review interview with the patient she would not respond to any commands or answer any questions and nursing staff had noted that this is how she has been since arrival.  Nursing staff noted that she will awaken to voice but not following commands which is what my assessment had established.  I did attempt to call the patient's daughter but this went to voicemail.  I was unable to complete any information regarding the patient's histories, review of systems or history of present illness at this time.  While in the ED she was noted to be in atrial fibrillation with rapid ventricular response with the highest documented rate at 118 at which time she received a one-time dose of 5 mg IV Lopressor and then a one-time dose of 25 mg p.o. Lopressor.     ED Course (Summary):   Vitals on presentation: T98.8, /120 --> 112/77,  --> 90, RR 20, SpO2 95% RA   CBC with differential: WBC 10.8, Hb 15.9, platelets 127  Chemistry panel: Glucose 177, sodium 131, testing 4.0, BUN 20, serum creatinine 1.01, mag 1.71  High-sensitivity troponin 39-repeat pending.  ECG initially atrial fibrillation with RVR which improved to heart rate of 90 following IV metoprolol.  Imaging and results from 11/26/2023-11/27/2023 reviewed.  CXR was with a patchy right basilar airspace opacity with pneumonia not been excluded (ID consultation also reviewed, given negative procal and no fever decision was made to monitor off antibiotics).  CT head was consistent with chronic changes, nothing acute.  COVID was positive days ago now.     11/28: Addendum patient resting comfortably heart rate is relatively well controlled feel she would do fine on a medical floor with telemetry.  Cardiology recommends increasing metoprolol for rate control continue  apixaban.  At this time have restarted patient's Paxlovid.  Okay to go to med telemetry patient states she feels better already.  Work on PT and OT with supportive care.    11/29: Patient's heart rate this morning elevated prior to getting her Lopressor orally.  Patient moving better with therapy no shortness of breath just malaise.  Continue to work on getting her beta-blocker titrated back up.  Patient had missed several doses of her metoprolol  mg a day prior to coming in due to illness and being stuck in the ER.  Continue with conservative care finishing up Paxlovid PT OT discharge planning skilled nursing facility for rehab.  We will check labs in a.m. patient to be seen by my associate  tomorrow    11/30: Patient was seen and examined.  Blood pressure and heart rate are better controlled today.  Patient was being seen by swallow therapist when I saw and attempting thin liquids with bite sized food as ordered.  She feels stronger according to her today although still generally very weak overall.  Seen by PT OT who recommend moderate intensity SNF.  Currently awaiting preauthorization for placement in an extended care facility.    Objective     Physical Exam  Constitutional:       Appearance: Normal appearance.   HENT:      Head: Normocephalic.      Right Ear: External ear normal.      Left Ear: External ear normal.      Nose: Rhinorrhea present.      Mouth/Throat:      Mouth: Mucous membranes are moist.   Eyes:      Extraocular Movements: Extraocular movements intact.      Conjunctiva/sclera: Conjunctivae normal.      Pupils: Pupils are equal, round, and reactive to light.   Cardiovascular:      Rate and Rhythm: Normal rate. Rhythm irregular.      Heart sounds: No murmur heard.  Pulmonary:      Effort: Pulmonary effort is normal.      Breath sounds: Normal breath sounds.   Abdominal:      General: Abdomen is flat.      Palpations: Abdomen is soft.   Musculoskeletal:      Cervical back: Normal  range of motion.      Left lower leg: Edema present.      Comments: Significant kyphosis of the spine   Skin:     General: Skin is warm.   Neurological:      General: No focal deficit present.      Mental Status: She is alert.      Comments: General frailty and weakness.   Psychiatric:         Mood and Affect: Mood normal.     Patient is pleasant states she feels better    Last Recorded Vitals  Blood pressure 132/89, pulse 91, temperature 36 °C (96.8 °F), resp. rate 16, height 1.524 m (5'), weight 55.8 kg (123 lb), SpO2 94 %.  Intake/Output last 3 Shifts:  I/O last 3 completed shifts:  In: 440 (7.9 mL/kg) [P.O.:440]  Out: 1050 (18.8 mL/kg) [Urine:1050 (0.5 mL/kg/hr)]  Weight: 55.8 kg     Relevant Results                  Scheduled medications  apixaban, 2.5 mg, oral, BID  insulin lispro, 0-10 Units, subcutaneous, Before meals & nightly  losartan, 25 mg, oral, Daily  melatonin, 3 mg, oral, Daily  metoprolol succinate XL, 100 mg, oral, Daily  metoprolol tartrate, 50 mg, oral, BID  nirmatrelvir-ritonavir, 2 tablet, oral, BID  pantoprazole, 40 mg, oral, Daily before breakfast   Or  pantoprazole, 40 mg, intravenous, Daily before breakfast  polyethylene glycol, 17 g, oral, Daily  thyroid (pork), 60 mg, oral, TID      Continuous medications     PRN medications  PRN medications: acetaminophen, bisacodyl, dextrose 10 % in water (D10W), dextrose, glucagon, guaiFENesin, metoprolol, ondansetron **OR** ondansetron             Assessment/Plan   Principal Problem:    Atrial fibrillation with rapid ventricular response (CMS/HCC)  Active Problems:    Pneumonia due to COVID-19 virus    Type 2 diabetes mellitus (CMS/HCC)    Generalized weakness    Acute metabolic encephalopathy           Continue on apixaban 2.5 mg twice daily  Metoprolol 50 mg twice a day  Metoprolol  mg daily on hold/home med  Losartan restarted.  Melatonin as needed  Low-dose sliding scale insulin  [place] Thyroid 60 mg daily  Paxlovid pack  Continue to  monitor on medical floor with telemetry  PT and OT  Increase activity  Monitor oxygen requirements  Discharge planning  See orders for complete plan  Check labs in a.m.  See orders for complete plan      I spent 35 minutes in the professional and overall care of this patient.      Camille Ovalle MD

## 2023-11-30 NOTE — PROGRESS NOTES
Physical Therapy    Physical Therapy Treatment    Patient Name: Kaitlin Sarkar  MRN: [id number]  Today's Date: 11/30/2023  Time Calculation  Start Time: 1020  Stop Time: 1043  Time Calculation (min): 23 min       Assessment/Plan   PT Assessment  PT Assessment Results: Decreased strength, Decreased endurance, Decreased mobility  End of Session Communication: Bedside nurse  Assessment Comment: patient increased gait to 20 feet. patient  requries verbal and tactile cues for safety  End of Session Patient Position: Up in chair, Alarm on  PT Plan  Inpatient/Swing Bed or Outpatient: Inpatient  PT Plan  Treatment/Interventions: Bed mobility, Transfer training, Gait training, Strengthening, Therapeutic exercise  PT Plan: Skilled PT  PT Frequency: 4 times per week  PT Discharge Recommendations: Moderate intensity level of continued care  Equipment Recommended upon Discharge: Wheeled walker (TBD)  PT Recommended Transfer Status: Assist x2 (FWW)  PT - OK to Discharge: Yes (TO NEXT LOC WHEN MEDICALLY CLEARED)      General Visit Information:   PT  Visit  PT Received On: 11/30/23  Response to Previous Treatment: Patient reporting fatigue but able to participate.  General  Prior to Session Communication: Bedside nurse  Patient Position Received: Bed, 3 rail up, Alarm off, caregiver present (daughter present)  General Comment: patient slow to respond and very  rigid    Subjective   Precautions:     Vital Signs:       Objective   Pain:  Pain Assessment  Pain Assessment: 0-10  Pain Score: 0 - No pain (no complaints of pain)  Cognition:  Cognition  Orientation Level: Disoriented to time  Postural Control:     Extremity/Trunk Assessments:    Activity Tolerance:  Activity Tolerance  Endurance: Tolerates 30 min exercise with multiple rests  Treatments:  Therapeutic Exercise  Therapeutic Exercise Performed: Yes  Therapeutic Exercise Activity 1: seated  marches, SAQ, ankle pumps, supine heelslides, hip abduction 13 reps each wiht mod assit to  move through full ROM    Therapeutic Activity  Therapeutic Activity Performed: Yes  Therapeutic Activity 1: donning  pants andshoes with min assist    Bed Mobility  Bed Mobility: Yes  Bed Mobility 1  Bed Mobility 1: Supine to sitting  Level of Assistance 1: Minimum assistance    Ambulation/Gait Training  Ambulation/Gait Training Performed: Yes  Ambulation/Gait Training 1  Surface 1: Level tile  Assistance 1: Minimum assistance  Quality of Gait 1: Shuffling gait, Decreased step length, Inconsistent stride length  Comments/Distance (ft) 1: gait training 45 feet x3 reps min assist  with seated active recovery in between  Transfers  Transfer: Yes  Transfer 1  Transfer From 1: Sit to  Transfer to 1: Stand (x3 reps)    Outcome Measures:  [facility] Basic Mobility  Turning from your back to your side while in a flat bed without using bedrails: A little  Moving from lying on your back to sitting on the side of a flat bed without using bedrails: A little  Moving to and from bed to chair (including a wheelchair): A little  Standing up from a chair using your arms (e.g. wheelchair or bedside chair): A little  To walk in hospital room: A lot  Climbing 3-5 steps with railing: Total  Basic Mobility - Total Score: 15    Education Documentation  Precautions, taught by Angela Dodge PTA at 11/30/2023 11:26 AM.  Learner: Patient  Readiness: Acceptance  Method: Explanation  Response: Verbalizes Understanding    Mobility Training, taught by Angela Dodge PTA at 11/30/2023 11:26 AM.  Learner: Patient  Readiness: Acceptance  Method: Explanation  Response: Verbalizes Understanding    Education Comments  No comments found.        OP EDUCATION:       Encounter Problems       Encounter Problems (Active)       Mobility       STG - Patient will ambulate (Progressing)       Start:  11/28/23    Expected End:  12/12/23       FWW + FT         STRENGTHENING (Progressing)       Start:  11/28/23    Expected End:  12/19/23       20+ REPS RROM  INCREASING STRENGTH TO STABILIZE OOB ACTIVITIES            Pain - Adult          Transfers       STG - Patient to transfer to and from sit to supine (Progressing)       Start:  11/28/23    Expected End:  12/12/23       MIN A NO RAILS         STG - Patient will transfer sit to and from stand (Progressing)       Start:  11/28/23    Expected End:  12/12/23       FWW CGA X1 A USIGN PROPER TECHNIQUE

## 2023-11-30 NOTE — CARE PLAN
The patient's goals for the shift include      Problem: Diabetes  Goal: Achieve decreasing blood glucose levels by end of shift  Outcome: Progressing  Goal: Increase stability of blood glucose readings by end of shift  Outcome: Progressing  Goal: Decrease in ketones present in urine by end of shift  Outcome: Progressing  Goal: Maintain electrolyte levels within acceptable range throughout shift  Outcome: Progressing  Goal: Maintain glucose levels >70mg/dl to <250mg/dl throughout shift  Outcome: Progressing  Goal: No changes in neurological exam by end of shift  Outcome: Progressing  Goal: Learn about and adhere to nutrition recommendations by end of shift  Outcome: Progressing  Goal: Vital signs within normal range for age by end of shift  Outcome: Progressing  Goal: Increase self care and/or family involovement by end of shift  Outcome: Progressing  Goal: Receive DSME education by end of shift  Outcome: Progressing     Problem: Pain - Adult  Goal: Verbalizes/displays adequate comfort level or baseline comfort level  Outcome: Progressing     Problem: Safety - Adult  Goal: Free from fall injury  Outcome: Progressing     Problem: Discharge Planning  Goal: Discharge to home or other facility with appropriate resources  Outcome: Progressing     Problem: Chronic Conditions and Co-morbidities  Goal: Patient's chronic conditions and co-morbidity symptoms are monitored and maintained or improved  Outcome: Progressing     Problem: Skin  Goal: Decreased wound size/increased tissue granulation at next dressing change  Outcome: Progressing  Goal: Participates in plan/prevention/treatment measures  Outcome: Progressing  Goal: Prevent/manage excess moisture  Outcome: Progressing  Goal: Prevent/minimize sheer/friction injuries  Outcome: Progressing  Flowsheets (Taken 11/30/2023 2692)  Prevent/minimize sheer/friction injuries: Increase activity/out of bed for meals  Goal: Promote/optimize nutrition  Outcome: Progressing  Flowsheets  (Taken 11/30/2023 1335)  Promote/optimize nutrition: Consume > 50% meals/supplements  Goal: Promote skin healing  Outcome: Progressing  Flowsheets (Taken 11/30/2023 1335)  Promote skin healing: Turn/reposition every 2 hours/use positioning/transfer devices       The clinical goals for the shift include pt will maintain free of falls this shift    See assessent and mar. Remains  on room air. See blood sugars. Telemetry maintained. Up to chair today.

## 2023-12-01 LAB
GLUCOSE BLD MANUAL STRIP-MCNC: 113 MG/DL (ref 74–99)
GLUCOSE BLD MANUAL STRIP-MCNC: 132 MG/DL (ref 74–99)
GLUCOSE BLD MANUAL STRIP-MCNC: 140 MG/DL (ref 74–99)
GLUCOSE BLD MANUAL STRIP-MCNC: 91 MG/DL (ref 74–99)

## 2023-12-01 PROCEDURE — 97530 THERAPEUTIC ACTIVITIES: CPT | Mod: GO,CO

## 2023-12-01 PROCEDURE — 1200000002 HC GENERAL ROOM WITH TELEMETRY DAILY

## 2023-12-01 PROCEDURE — C9113 INJ PANTOPRAZOLE SODIUM, VIA: HCPCS | Performed by: STUDENT IN AN ORGANIZED HEALTH CARE EDUCATION/TRAINING PROGRAM

## 2023-12-01 PROCEDURE — 97535 SELF CARE MNGMENT TRAINING: CPT | Mod: GO,CO

## 2023-12-01 PROCEDURE — 2500000001 HC RX 250 WO HCPCS SELF ADMINISTERED DRUGS (ALT 637 FOR MEDICARE OP): Performed by: INTERNAL MEDICINE

## 2023-12-01 PROCEDURE — 92526 ORAL FUNCTION THERAPY: CPT | Mod: GN | Performed by: PHARMACIST

## 2023-12-01 PROCEDURE — 2500000004 HC RX 250 GENERAL PHARMACY W/ HCPCS (ALT 636 FOR OP/ED): Performed by: INTERNAL MEDICINE

## 2023-12-01 PROCEDURE — 97116 GAIT TRAINING THERAPY: CPT | Mod: CQ,GP

## 2023-12-01 PROCEDURE — 2500000004 HC RX 250 GENERAL PHARMACY W/ HCPCS (ALT 636 FOR OP/ED): Performed by: STUDENT IN AN ORGANIZED HEALTH CARE EDUCATION/TRAINING PROGRAM

## 2023-12-01 PROCEDURE — 82947 ASSAY GLUCOSE BLOOD QUANT: CPT

## 2023-12-01 PROCEDURE — 2500000001 HC RX 250 WO HCPCS SELF ADMINISTERED DRUGS (ALT 637 FOR MEDICARE OP): Performed by: STUDENT IN AN ORGANIZED HEALTH CARE EDUCATION/TRAINING PROGRAM

## 2023-12-01 PROCEDURE — 99233 SBSQ HOSP IP/OBS HIGH 50: CPT | Performed by: INTERNAL MEDICINE

## 2023-12-01 RX ADMIN — ONDANSETRON 4 MG: 2 INJECTION INTRAMUSCULAR; INTRAVENOUS at 09:59

## 2023-12-01 RX ADMIN — APIXABAN 2.5 MG: 5 TABLET, FILM COATED ORAL at 09:59

## 2023-12-01 RX ADMIN — PANTOPRAZOLE SODIUM 40 MG: 40 INJECTION, POWDER, FOR SOLUTION INTRAVENOUS at 05:06

## 2023-12-01 RX ADMIN — METOPROLOL TARTRATE 50 MG: 50 TABLET, FILM COATED ORAL at 21:46

## 2023-12-01 RX ADMIN — LEVOTHYROXINE, LIOTHYRONINE 60 MG: 38; 9 TABLET ORAL at 16:04

## 2023-12-01 RX ADMIN — APIXABAN 2.5 MG: 5 TABLET, FILM COATED ORAL at 21:46

## 2023-12-01 RX ADMIN — LEVOTHYROXINE, LIOTHYRONINE 60 MG: 38; 9 TABLET ORAL at 21:46

## 2023-12-01 RX ADMIN — Medication 3 MG: at 21:46

## 2023-12-01 RX ADMIN — METOPROLOL SUCCINATE 100 MG: 50 TABLET, EXTENDED RELEASE ORAL at 10:07

## 2023-12-01 RX ADMIN — LEVOTHYROXINE, LIOTHYRONINE 60 MG: 38; 9 TABLET ORAL at 09:59

## 2023-12-01 RX ADMIN — POLYETHYLENE GLYCOL 3350 17 G: 17 POWDER, FOR SOLUTION ORAL at 09:59

## 2023-12-01 RX ADMIN — LOSARTAN POTASSIUM 25 MG: 25 TABLET, FILM COATED ORAL at 09:59

## 2023-12-01 RX ADMIN — ACETAMINOPHEN 650 MG: 325 TABLET ORAL at 16:08

## 2023-12-01 RX ADMIN — METOPROLOL TARTRATE 50 MG: 50 TABLET, FILM COATED ORAL at 10:07

## 2023-12-01 ASSESSMENT — COGNITIVE AND FUNCTIONAL STATUS - GENERAL
DRESSING REGULAR LOWER BODY CLOTHING: A LOT
DAILY ACTIVITIY SCORE: 14
PERSONAL GROOMING: A LITTLE
EATING MEALS: A LITTLE
MOBILITY SCORE: 14
STANDING UP FROM CHAIR USING ARMS: A LITTLE
MOBILITY SCORE: 14
TURNING FROM BACK TO SIDE WHILE IN FLAT BAD: A LITTLE
MOVING FROM LYING ON BACK TO SITTING ON SIDE OF FLAT BED WITH BEDRAILS: A LITTLE
WALKING IN HOSPITAL ROOM: A LOT
TOILETING: A LOT
MOVING FROM LYING ON BACK TO SITTING ON SIDE OF FLAT BED WITH BEDRAILS: A LITTLE
TURNING FROM BACK TO SIDE WHILE IN FLAT BAD: A LITTLE
EATING MEALS: A LITTLE
HELP NEEDED FOR BATHING: A LOT
CLIMB 3 TO 5 STEPS WITH RAILING: TOTAL
MOVING TO AND FROM BED TO CHAIR: A LOT
DAILY ACTIVITIY SCORE: 14
MOBILITY SCORE: 14
STANDING UP FROM CHAIR USING ARMS: A LITTLE
CLIMB 3 TO 5 STEPS WITH RAILING: TOTAL
MOVING TO AND FROM BED TO CHAIR: A LOT
PERSONAL GROOMING: A LITTLE
DRESSING REGULAR UPPER BODY CLOTHING: A LOT
CLIMB 3 TO 5 STEPS WITH RAILING: TOTAL
MOVING TO AND FROM BED TO CHAIR: A LOT
DRESSING REGULAR LOWER BODY CLOTHING: A LOT
DRESSING REGULAR UPPER BODY CLOTHING: A LOT
DRESSING REGULAR UPPER BODY CLOTHING: A LOT
HELP NEEDED FOR BATHING: A LOT
STANDING UP FROM CHAIR USING ARMS: A LITTLE
DAILY ACTIVITIY SCORE: 14
MOVING FROM LYING ON BACK TO SITTING ON SIDE OF FLAT BED WITH BEDRAILS: A LITTLE
TOILETING: A LOT
TOILETING: A LOT
EATING MEALS: A LITTLE
PERSONAL GROOMING: A LITTLE
DRESSING REGULAR LOWER BODY CLOTHING: A LOT
HELP NEEDED FOR BATHING: A LOT
WALKING IN HOSPITAL ROOM: A LOT
TURNING FROM BACK TO SIDE WHILE IN FLAT BAD: A LITTLE
WALKING IN HOSPITAL ROOM: A LOT

## 2023-12-01 ASSESSMENT — PAIN SCALES - GENERAL
PAINLEVEL_OUTOF10: 0 - NO PAIN
PAINLEVEL_OUTOF10: 2
PAINLEVEL_OUTOF10: 0 - NO PAIN

## 2023-12-01 ASSESSMENT — ACTIVITIES OF DAILY LIVING (ADL): HOME_MANAGEMENT_TIME_ENTRY: 10

## 2023-12-01 ASSESSMENT — PAIN - FUNCTIONAL ASSESSMENT
PAIN_FUNCTIONAL_ASSESSMENT: 0-10

## 2023-12-01 ASSESSMENT — PAIN DESCRIPTION - ORIENTATION: ORIENTATION: LEFT;RIGHT

## 2023-12-01 ASSESSMENT — PAIN DESCRIPTION - LOCATION: LOCATION: BACK

## 2023-12-01 NOTE — PROGRESS NOTES
Physical Therapy  Physical Therapy Treatment    Patient Name: Kaitlin Sarkar  MRN: [id number]  Today's Date: 12/1/2023  Time Calculation  Start Time: 1039  Stop Time: 1102  Time Calculation (min): 23 min     Assessment/Plan   PT Plan  Inpatient/Swing Bed or Outpatient: Inpatient  PT Plan  Treatment/Interventions: Bed mobility, Transfer training, Gait training, Strengthening, Therapeutic exercise  PT Plan: Skilled PT  PT Frequency: 4 times per week  PT Discharge Recommendations: Moderate intensity level of continued care  Equipment Recommended upon Discharge: Wheeled walker (TBD)  PT Recommended Transfer Status: Assist x2 (FWW)  PT - OK to Discharge: Yes (TO NEXT LOC WHEN MEDICALLY CLEARED)    General Visit Information:   PT  Visit  PT Received On: 12/01/23  Response to Previous Treatment: Patient with no complaints from previous session.  Reason for Referral: Covid/confusion  Co-tx with OT to increase pt safety and mobility  Room: [facility]    Subjective   Precautions:  Falls  Covid precautions     Objective   Pain:  Pain Assessment  Pain Assessment: 0-10  Pain Score: 0 - No pain    Cognition:  Cognition  Overall Cognitive Status:  (mild confusion)      Activity Tolerance:  Activity Tolerance  Endurance: Tolerates 10 - 20 min exercise with multiple rests    Treatments:  Ambulation/Gait Training  30 feet x2 reps with FWW, [place]  Seated rest between reps  VC for posture, FWW approximation, environmental awareness     Transfers  Sit to stand: [place]  Stand to sit: [place]  Rolling Walker: [place]      Other Activity  Other Activity Performed:  (chair pre/post tx)    Outcome Measures:  [facility] Basic Mobility  Turning from your back to your side while in a flat bed without using bedrails: A little  Moving from lying on your back to sitting on the side of a flat bed without using bedrails: A little  Moving to and from bed to chair (including a wheelchair): A lot  Standing up from a chair using your arms (e.g. wheelchair or bedside chair): A  little  To walk in hospital room: A lot  Climbing 3-5 steps with railing: Total  Basic Mobility - Total Score: 14    Education Documentation  Mobility Training, taught by Wil Potts PTA at 12/1/2023 12:34 PM.  Learner: Family, Patient  Readiness: Acceptance  Method: Explanation, Demonstration  Response: Needs Reinforcement    Education Comments  No comments found.        OP EDUCATION:       Encounter Problems       Encounter Problems (Active)       Mobility       STG - Patient will ambulate (Progressing)       Start:  11/28/23    Expected End:  12/12/23       FWW + FT         STRENGTHENING (Progressing)       Start:  11/28/23    Expected End:  12/19/23       20+ REPS RROM INCREASING STRENGTH TO STABILIZE OOB ACTIVITIES            Pain - Adult          Transfers       STG - Patient to transfer to and from sit to supine (Progressing)       Start:  11/28/23    Expected End:  12/12/23       MIN A NO RAILS         STG - Patient will transfer sit to and from stand (Progressing)       Start:  11/28/23    Expected End:  12/12/23       FWW CGA X1 A USIGN PROPER TECHNIQUE

## 2023-12-01 NOTE — PROGRESS NOTES
[facility] asking for patient's SSN and for daughter's contact info. I called pt's dtr Alma who is now stating that they are not interested in [facility] anymore. [facility] does not have a bed at this time. No response from [facility] at this time. I contacted [facility] and spoke with admissions who will need to review with administration. [facility] will respond back in [facility] once they have an answer. Per pt's dtr Alma, she does not know patient's SSN and does not have the SNF list with her. If [facility] is unable to accept, Alma will re-review list with patient and provide further choices. Alma was also informed she will need to provide patient's SSN in order for patient to be placed. Awaiting reply from [place].

## 2023-12-01 NOTE — PROGRESS NOTES
Speech-Language Pathology Clinical Swallow Treatment    Patient Name: Kaitlin Sarkar  MRN: [id number]  : 10/18/1930  Today's Date: 23  Start time: Start Time: 8  Stop time: Stop Time: 1504  Time calculation (min) : Time Calculation (min): 26 min    ASSESSMENT  SLP TX Intervention Outcome: Making Progress Towards Goals  SLP Assessment Results: Cognitive Deficits, Safety/judgement deficits  Treatment Tolerance: Patient tolerated treatment well    PLAN  Recommended solid consistency: Soft/bite-sized  Recommended liquid consistency: Thin (IDDSI 0)  Recommended medication administration: Whole in thin liquid  Safe swallow strategies: Upright positioning for all PO intake Slow rate of intake Do not feed unless pt is fully alert and upright  Discharge recommendation: Recommend LOW intensity ST upon DC in order to ensure safety with least restrictive diet.  Inpatient/Swing Bed or Outpatient: Inpatient  SLP TX Plan: Continue Plan of Care  SLP Plan: Skilled SLP  SLP Frequency: 2x per week  Duration: 2 weeks  Next Treatment Priority: continue to assess swallow function for regular textures and thin liquids.  Discussed POC: Patient, Nursing, Physician  Patient/Caregiver Agreeable: Yes    SUBJECTIVE  Prior to Session Communication: Bedside nurse  RN cleared pt to participate in session and reported that pt had a coughing episode earlier this date.  Pt's daughter was present during session and reported that pt had 2 productive coughing episodes but that she does not recall either of them being related to PO intake.  Respiratory status: Room air  Positioning: Upright in chair  Pt was alert, pleasant, and cooperative for session.    Pain Assessment: 0-10  Pain Score: 0/10 (pt reported pain was 0/10 but requested Tylenol; pt's daughter reported that she was recently c/o back pain)       OBJECTIVE  Swallow reassessment:  Therapeutic Swallow  Therapeutic Swallow Intervention : PO Trials, Caregiver Education  Solid Diet  Recommendations: Soft & bite sized/chopped (IDDSI Level 6)  Liquid Diet Recommendations: Thin (IDDSI Level 0)  Swallow Comments: Swallow reassessment completed. Pt consumed sof/bite-sized solids x5 bites, then reported she did not want any more due to nausea. Mastication appeared grossly adequate, with no oral residuals seen between bites. Pt again with suspected delayed or absent pharyngeal swallow based on palpation after some bites. No overt s/sx aspiration/penetration were observed. Education was completed to pt's daughter that diet texture should remain soft at this time due to pt's impaired cognition with concern for possible pooling of solid boluses in the pharynx between bites. Pt may benefit from instrumental swallow evaluation, however this is unable to be completed at this facility while she is Covid+. SLP will continue to assess and make further recommendations as appropriate.    Treatment/Education:  Results and recommendations were relayed to: Patient's daughter  Education provided: Yes   Learner: Child   Barriers to learning: None   Method of teaching: Verbal   Topic: Role of ST, results of assessment, risk for aspiration, recommended diet modifications, and recommendation for dysphagia follow-up   Outcome of teaching: Family verbalized understanding    Goals:  1. Pt will consume prescribed diet (current diet is Soft bite-sized diet and THIN ) without overt s/sx aspiration/penetration >95% of the time.  2. Pt will consume trials of upgraded textures without overt s/sx aspiration in order for consideration of diet texture upgrade.

## 2023-12-01 NOTE — PROGRESS NOTES
Occupational Therapy    Occupational Therapy    OT Treatment    Patient Name: Kaitlin Sarkar  MRN: [id number]  Today's Date: 12/1/2023  Time Calculation  Start Time: 1039  Stop Time: 1102  Time Calculation (min): 23 min         Assessment:  OT Assessment: Pt progressed to min A x1 for functional transfers and mobility min household distance with mod verbal cues for safety, sequencing, transfer technique and hand placement. Pt failed to avoid obstacles in environment x2, with min verbal cues to correct.            Subjective     Current Problem:  Patient Active Problem List   Diagnosis    Pneumonia due to COVID-19 virus    Chronic atrial fibrillation (CMS/HCC)    Type 2 diabetes mellitus (CMS/HCC)    Altered mental status    Generalized weakness    Atrial fibrillation with rapid ventricular response (CMS/HCC)    Acute metabolic encephalopathy       General:  OT Received On: 12/01/23  Prior to Session Communication: Bedside nurse  Patient Position Received: Up in chair, Alarm on  General Comment: Pt agreeable and cooperative to therapy.    Vital Signs:       Pain:  Pain Assessment  Pain Assessment: 0-10  Pain Score: 0 - No pain  Objective      Activities of Daily Living:                LE Dressing  LE Dressing: Yes  Sock Level of Assistance: Moderate assistance  Shoe Level of Assistance: Moderate assistance       Functional Standing Tolerance:  Activity: Pt tolerated standing for 1-2 minutes x2 trials with min A using FWW for support.    Bed Mobility/Transfers:    Transfers  Transfer: Yes  Transfer 1  Transfer From 1: Sit to  Transfer to 1: Commode-standard  Transfer Device 1: Walker  Transfer Level of Assistance 1: Minimum assistance, Moderate verbal cues  Transfers 2  Transfer From 2: Chair with arms to  Transfer to 2: Chair with arms  Technique 2: Sit to stand, Stand to sit  Transfer Device 2: Walker  Transfer Level of Assistance 2: Minimum assistance, Moderate verbal cues                Therapy/Activity:     Therapeutic Activity  Therapeutic Activity Performed: Yes  Therapeutic Activity 1: Pt completed functional mobility min household distance x2 trials with min A using FWW.               Outcome Measures:[facility] Daily Activity  Putting on and taking off regular lower body clothing: A lot  Bathing (including washing, rinsing, drying): A lot  Putting on and taking off regular upper body clothing: A lot  Toileting, which includes using toilet, bedpan or urinal: A lot  Taking care of personal grooming such as brushing teeth: A little  Eating Meals: A little  Daily Activity - Total Score: 14  Education Documentation  ADL Training, taught by DE Eckert at 12/1/2023 12:44 PM.  Learner: Patient  Readiness: Acceptance  Method: Explanation  Response: Needs Reinforcement    Education Comments  No comments found.             Goals:  Encounter Problems       Encounter Problems (Active)       ADLs       Patient with complete lower body dressing with modified independent level of assistance donning and doffing all LE clothes  with PRN adaptive equipment while supported sitting and standing (Progressing)       Start:  11/28/23    Expected End:  12/12/23            Patient will complete daily grooming tasks brushing teeth and washing face/hair with modified independent level of assistance and PRN adaptive equipment while supported sitting and standing. (Progressing)       Start:  11/28/23    Expected End:  12/12/23                     TRANSFERS       Patient will complete functional transfers and functional mobility with least restrictive device with supervision level of assistance. (Progressing)       Start:  11/28/23    Expected End:  12/12/23

## 2023-12-01 NOTE — CARE PLAN
The patient's goals for the shift include    Problem: Diabetes  Goal: Achieve decreasing blood glucose levels by end of shift  Outcome: Progressing  Goal: Increase stability of blood glucose readings by end of shift  Outcome: Progressing  Goal: Decrease in ketones present in urine by end of shift  Outcome: Progressing  Goal: Maintain electrolyte levels within acceptable range throughout shift  Outcome: Progressing  Goal: Maintain glucose levels >70mg/dl to <250mg/dl throughout shift  Outcome: Progressing  Goal: No changes in neurological exam by end of shift  Outcome: Progressing  Goal: Learn about and adhere to nutrition recommendations by end of shift  Outcome: Progressing  Goal: Vital signs within normal range for age by end of shift  Outcome: Progressing  Goal: Increase self care and/or family involovement by end of shift  Outcome: Progressing  Goal: Receive DSME education by end of shift  Outcome: Progressing     Problem: Pain - Adult  Goal: Verbalizes/displays adequate comfort level or baseline comfort level  Outcome: Progressing     Problem: Safety - Adult  Goal: Free from fall injury  Outcome: Progressing     Problem: Discharge Planning  Goal: Discharge to home or other facility with appropriate resources  Outcome: Progressing     Problem: Chronic Conditions and Co-morbidities  Goal: Patient's chronic conditions and co-morbidity symptoms are monitored and maintained or improved  Outcome: Progressing     Problem: Skin  Goal: Decreased wound size/increased tissue granulation at next dressing change  Outcome: Progressing  Flowsheets (Taken 12/1/2023 0740)  Decreased wound size/increased tissue granulation at next dressing change:   Promote sleep for wound healing   Protective dressings over bony prominences  Goal: Participates in plan/prevention/treatment measures  Outcome: Progressing  Flowsheets (Taken 12/1/2023 0740)  Participates in plan/prevention/treatment measures:   Discuss with provider PT/OT consult    Elevate heels  Goal: Prevent/manage excess moisture  Outcome: Progressing  Flowsheets (Taken 12/1/2023 0740)  Prevent/manage excess moisture:   Cleanse incontinence/protect with barrier cream   Follow provider orders for dressing changes   Monitor for/manage infection if present  Goal: Prevent/minimize sheer/friction injuries  Outcome: Progressing  Flowsheets (Taken 12/1/2023 0740)  Prevent/minimize sheer/friction injuries:   HOB 30 degrees or less   Increase activity/out of bed for meals   Turn/reposition every 2 hours/use positioning/transfer devices  Goal: Promote/optimize nutrition  Outcome: Progressing  Flowsheets (Taken 12/1/2023 0740)  Promote/optimize nutrition: Monitor/record intake including meals  Goal: Promote skin healing  Outcome: Progressing  Flowsheets (Taken 12/1/2023 0740)  Promote skin healing:   Rotate device position/do not position patient on device   Turn/reposition every 2 hours/use positioning/transfer devices     Problem: Arrythmia/Dysrhythmia  Goal: Lab values return to normal range  Outcome: Progressing  Goal: Vital signs return to baseline  Outcome: Progressing

## 2023-12-02 LAB
BACTERIA BLD CULT: NORMAL
BACTERIA BLD CULT: NORMAL
GLUCOSE BLD MANUAL STRIP-MCNC: 100 MG/DL (ref 74–99)
GLUCOSE BLD MANUAL STRIP-MCNC: 122 MG/DL (ref 74–99)
GLUCOSE BLD MANUAL STRIP-MCNC: 126 MG/DL (ref 74–99)
GLUCOSE BLD MANUAL STRIP-MCNC: 138 MG/DL (ref 74–99)

## 2023-12-02 PROCEDURE — 1200000002 HC GENERAL ROOM WITH TELEMETRY DAILY

## 2023-12-02 PROCEDURE — 2500000001 HC RX 250 WO HCPCS SELF ADMINISTERED DRUGS (ALT 637 FOR MEDICARE OP): Performed by: INTERNAL MEDICINE

## 2023-12-02 PROCEDURE — 2500000004 HC RX 250 GENERAL PHARMACY W/ HCPCS (ALT 636 FOR OP/ED): Performed by: INTERNAL MEDICINE

## 2023-12-02 PROCEDURE — 82947 ASSAY GLUCOSE BLOOD QUANT: CPT

## 2023-12-02 PROCEDURE — 2500000004 HC RX 250 GENERAL PHARMACY W/ HCPCS (ALT 636 FOR OP/ED): Performed by: STUDENT IN AN ORGANIZED HEALTH CARE EDUCATION/TRAINING PROGRAM

## 2023-12-02 PROCEDURE — 2500000001 HC RX 250 WO HCPCS SELF ADMINISTERED DRUGS (ALT 637 FOR MEDICARE OP): Performed by: STUDENT IN AN ORGANIZED HEALTH CARE EDUCATION/TRAINING PROGRAM

## 2023-12-02 PROCEDURE — 99233 SBSQ HOSP IP/OBS HIGH 50: CPT | Performed by: INTERNAL MEDICINE

## 2023-12-02 RX ADMIN — ACETAMINOPHEN 650 MG: 325 TABLET ORAL at 06:29

## 2023-12-02 RX ADMIN — PANTOPRAZOLE SODIUM 40 MG: 40 TABLET, DELAYED RELEASE ORAL at 06:29

## 2023-12-02 RX ADMIN — METOPROLOL SUCCINATE 100 MG: 50 TABLET, EXTENDED RELEASE ORAL at 08:17

## 2023-12-02 RX ADMIN — LOSARTAN POTASSIUM 25 MG: 25 TABLET, FILM COATED ORAL at 08:16

## 2023-12-02 RX ADMIN — APIXABAN 2.5 MG: 5 TABLET, FILM COATED ORAL at 20:58

## 2023-12-02 RX ADMIN — METOPROLOL TARTRATE 50 MG: 50 TABLET, FILM COATED ORAL at 20:58

## 2023-12-02 RX ADMIN — Medication 3 MG: at 20:58

## 2023-12-02 RX ADMIN — LEVOTHYROXINE, LIOTHYRONINE 60 MG: 38; 9 TABLET ORAL at 20:58

## 2023-12-02 RX ADMIN — METOPROLOL TARTRATE 50 MG: 50 TABLET, FILM COATED ORAL at 08:16

## 2023-12-02 RX ADMIN — APIXABAN 2.5 MG: 5 TABLET, FILM COATED ORAL at 08:15

## 2023-12-02 RX ADMIN — LEVOTHYROXINE, LIOTHYRONINE 60 MG: 38; 9 TABLET ORAL at 15:15

## 2023-12-02 RX ADMIN — LEVOTHYROXINE, LIOTHYRONINE 60 MG: 38; 9 TABLET ORAL at 08:16

## 2023-12-02 RX ADMIN — POLYETHYLENE GLYCOL 3350 17 G: 17 POWDER, FOR SOLUTION ORAL at 08:14

## 2023-12-02 ASSESSMENT — COGNITIVE AND FUNCTIONAL STATUS - GENERAL
PERSONAL GROOMING: A LITTLE
STANDING UP FROM CHAIR USING ARMS: A LITTLE
CLIMB 3 TO 5 STEPS WITH RAILING: TOTAL
DRESSING REGULAR LOWER BODY CLOTHING: A LOT
EATING MEALS: A LITTLE
EATING MEALS: A LITTLE
DRESSING REGULAR LOWER BODY CLOTHING: A LOT
TURNING FROM BACK TO SIDE WHILE IN FLAT BAD: A LITTLE
PERSONAL GROOMING: A LITTLE
WALKING IN HOSPITAL ROOM: A LOT
STANDING UP FROM CHAIR USING ARMS: A LITTLE
DRESSING REGULAR UPPER BODY CLOTHING: A LOT
MOVING FROM LYING ON BACK TO SITTING ON SIDE OF FLAT BED WITH BEDRAILS: A LITTLE
TURNING FROM BACK TO SIDE WHILE IN FLAT BAD: A LITTLE
DRESSING REGULAR UPPER BODY CLOTHING: A LOT
TOILETING: A LOT
HELP NEEDED FOR BATHING: A LOT
DAILY ACTIVITIY SCORE: 14
CLIMB 3 TO 5 STEPS WITH RAILING: TOTAL
WALKING IN HOSPITAL ROOM: A LOT
MOVING TO AND FROM BED TO CHAIR: A LOT
MOBILITY SCORE: 14
TOILETING: A LOT
MOVING FROM LYING ON BACK TO SITTING ON SIDE OF FLAT BED WITH BEDRAILS: A LITTLE
MOBILITY SCORE: 14
MOVING TO AND FROM BED TO CHAIR: A LOT
DAILY ACTIVITIY SCORE: 14
HELP NEEDED FOR BATHING: A LOT

## 2023-12-02 ASSESSMENT — PAIN SCALES - WONG BAKER: WONGBAKER_NUMERICALRESPONSE: NO HURT

## 2023-12-02 ASSESSMENT — PAIN - FUNCTIONAL ASSESSMENT
PAIN_FUNCTIONAL_ASSESSMENT: 0-10
PAIN_FUNCTIONAL_ASSESSMENT: 0-10

## 2023-12-02 ASSESSMENT — PAIN SCALES - GENERAL
PAINLEVEL_OUTOF10: 3
PAINLEVEL_OUTOF10: 0 - NO PAIN
PAINLEVEL_OUTOF10: 0 - NO PAIN

## 2023-12-02 ASSESSMENT — PAIN DESCRIPTION - LOCATION: LOCATION: GENERALIZED

## 2023-12-02 NOTE — CARE PLAN
Problem: Diabetes  Goal: Achieve decreasing blood glucose levels by end of shift  Outcome: Progressing  Goal: Increase stability of blood glucose readings by end of shift  Outcome: Progressing  Goal: Decrease in ketones present in urine by end of shift  Outcome: Progressing  Goal: Maintain electrolyte levels within acceptable range throughout shift  Outcome: Progressing  Goal: Maintain glucose levels >70mg/dl to <250mg/dl throughout shift  Outcome: Progressing  Goal: No changes in neurological exam by end of shift  Outcome: Progressing  Goal: Learn about and adhere to nutrition recommendations by end of shift  Outcome: Progressing  Goal: Vital signs within normal range for age by end of shift  Outcome: Progressing  Goal: Increase self care and/or family involovement by end of shift  Outcome: Progressing  Goal: Receive DSME education by end of shift  Outcome: Progressing     Problem: Pain - Adult  Goal: Verbalizes/displays adequate comfort level or baseline comfort level  Outcome: Progressing     Problem: Safety - Adult  Goal: Free from fall injury  Outcome: Progressing     Problem: Discharge Planning  Goal: Discharge to home or other facility with appropriate resources  Outcome: Progressing     Problem: Chronic Conditions and Co-morbidities  Goal: Patient's chronic conditions and co-morbidity symptoms are monitored and maintained or improved  Outcome: Progressing     Problem: Skin  Goal: Decreased wound size/increased tissue granulation at next dressing change  Outcome: Progressing  Goal: Participates in plan/prevention/treatment measures  Outcome: Progressing  Goal: Prevent/manage excess moisture  Outcome: Progressing  Goal: Prevent/minimize sheer/friction injuries  Outcome: Progressing  Goal: Promote/optimize nutrition  Outcome: Progressing  Goal: Promote skin healing  Outcome: Progressing     Problem: Arrythmia/Dysrhythmia  Goal: Lab values return to normal range  Outcome: Progressing  Goal: Vital signs return  to baseline  Outcome: Progressing   The patient's goals for the shift include      The clinical goals for the shift include Pt will be free of injury during shift    Over the shift, the patient did not make progress toward the following goals. Barriers to progression include . Recommendations to address these barriers include .

## 2023-12-02 NOTE — PROGRESS NOTES
Kaitlin Sarkar is a 89 y.o. female on day 4 of admission presenting with Atrial fibrillation with rapid ventricular response (CMS/HCC).    Subjective   Kaitlin Irwin is a 89 y.o. female presenting with the above.  The patient presents from home with her daughter who states that she has been crying and very weak.  She brought her to the hospital where she was found to be also very confused, and her daughter states that this is not her norm.  At present the patient is only oriented to person, and unable to contribute to this history.  The patient does have chronic atrial fibrillation and is on a blood thinner.  We are unsure what this blood thinner is at this point.  She also has type 2 diabetes mellitus which according to the ER physician is being diet controlled.  The patient does not appear to be in any distress at this time.  She presents for further evaluation.     11/27: Patient is weak but not hypoxic.  Patient and daughter had talked and since patient is just sitting in the ER not having much done they feel they can do just as well if not better by taking her home.  We will have her discharged home with some home health care at this time.  Turn to ER if symptoms worsen.     11/28: Kaitlin Sarkar is a 89 y.o. female with PMHx s/f HTN, chronic atrial fibrillation on Eliquis, T2DM, and admission for generalized weakness/COVID-19/confusion (11/26-11/27) at [facility], presenting with worsening confusion and worsening generalized weakness.  Patient was discharged on 11/27/2023 following a conversation between the patient's family and the inpatient physician, at that time family was feeling that due to the patient boarding in the ER, she would be better cared for at home.  After discharge, the patient went back to her house and family was unable to get her out of the car due to her worsening weakness.  They also report that her mentation worsened during this time even since discharge from the ED, to where she is more  confused and not as alert.  Patient does not provide any information at this time due to her encephalopathy in setting of COVID-19 most likely also it is unclear if she has a history of dementia.  Upon my review interview with the patient she would not respond to any commands or answer any questions and nursing staff had noted that this is how she has been since arrival.  Nursing staff noted that she will awaken to voice but not following commands which is what my assessment had established.  I did attempt to call the patient's daughter but this went to voicemail.  I was unable to complete any information regarding the patient's histories, review of systems or history of present illness at this time.  While in the ED she was noted to be in atrial fibrillation with rapid ventricular response with the highest documented rate at 118 at which time she received a one-time dose of 5 mg IV Lopressor and then a one-time dose of 25 mg p.o. Lopressor.     ED Course (Summary):   Vitals on presentation: T98.8, /120 --> 112/77,  --> 90, RR 20, SpO2 95% RA   CBC with differential: WBC 10.8, Hb 15.9, platelets 127  Chemistry panel: Glucose 177, sodium 131, testing 4.0, BUN 20, serum creatinine 1.01, mag 1.71  High-sensitivity troponin 39-repeat pending.  ECG initially atrial fibrillation with RVR which improved to heart rate of 90 following IV metoprolol.  Imaging and results from 11/26/2023-11/27/2023 reviewed.  CXR was with a patchy right basilar airspace opacity with pneumonia not been excluded (ID consultation also reviewed, given negative procal and no fever decision was made to monitor off antibiotics).  CT head was consistent with chronic changes, nothing acute.  COVID was positive days ago now.     11/28: Addendum patient resting comfortably heart rate is relatively well controlled feel she would do fine on a medical floor with telemetry.  Cardiology recommends increasing metoprolol for rate control continue  apixaban.  At this time have restarted patient's Paxlovid.  Okay to go to med telemetry patient states she feels better already.  Work on PT and OT with supportive care.    11/29: Patient's heart rate this morning elevated prior to getting her Lopressor orally.  Patient moving better with therapy no shortness of breath just malaise.  Continue to work on getting her beta-blocker titrated back up.  Patient had missed several doses of her metoprolol  mg a day prior to coming in due to illness and being stuck in the ER.  Continue with conservative care finishing up Paxlovid PT OT discharge planning skilled nursing facility for rehab.  We will check labs in a.m. patient to be seen by my associate  tomorrow    11/30: Patient was seen and examined.  Blood pressure and heart rate are better controlled today.  Patient was being seen by swallow therapist when I saw and attempting thin liquids with bite sized food as ordered.  She feels stronger according to her today although still generally very weak overall.  Seen by PT OT who recommend moderate intensity SNF.  Currently awaiting preauthorization for placement in an extended care facility.    12/1: Patient was seen and examined.  She has no new complaints today.  Tolerated her meals today without nausea or vomiting.  Currently awaiting preauthorization for placement in an extended care facility.  12/2: Patient was seen and examined.  Continues to cough intermittently but however feels better.  She continues to tolerate orally.  Discussed extensively with daughter at bedside about the plan of care.  Currently awaiting preauthorization for placement in extended-care facility.      Objective     Physical Exam  Constitutional:       Appearance: Normal appearance.   HENT:      Head: Normocephalic.      Right Ear: External ear normal.      Left Ear: External ear normal.      Nose: Rhinorrhea present.      Mouth/Throat:      Mouth: Mucous membranes are moist.    Eyes:      Extraocular Movements: Extraocular movements intact.      Conjunctiva/sclera: Conjunctivae normal.      Pupils: Pupils are equal, round, and reactive to light.   Cardiovascular:      Rate and Rhythm: Normal rate. Rhythm irregular.      Heart sounds: No murmur heard.  Pulmonary:      Effort: Pulmonary effort is normal.      Breath sounds: Normal breath sounds.   Abdominal:      General: Abdomen is flat.      Palpations: Abdomen is soft.   Musculoskeletal:      Cervical back: Normal range of motion.      Left lower leg: Edema present.      Comments: Significant kyphosis of the spine   Skin:     General: Skin is warm.   Neurological:      General: No focal deficit present.      Mental Status: She is alert.      Comments: General frailty and weakness.   Psychiatric:         Mood and Affect: Mood normal.     Patient is pleasant states she feels better    Last Recorded Vitals  Blood pressure 127/80, pulse 82, temperature 36.3 °C (97.4 °F), temperature source Temporal, resp. rate 17, height 1.524 m (5'), weight 55.8 kg (123 lb), SpO2 95 %.  Intake/Output last 3 Shifts:  I/O last 3 completed shifts:  In: 240 (4.3 mL/kg) [P.O.:240]  Out: - (0 mL/kg)   Weight: 55.8 kg     Relevant Results                  Scheduled medications  apixaban, 2.5 mg, oral, BID  insulin lispro, 0-10 Units, subcutaneous, Before meals & nightly  losartan, 25 mg, oral, Daily  melatonin, 3 mg, oral, Daily  metoprolol succinate XL, 100 mg, oral, Daily  metoprolol tartrate, 50 mg, oral, BID  [Held by provider] nirmatrelvir-ritonavir, 2 tablet, oral, BID  pantoprazole, 40 mg, oral, Daily before breakfast   Or  pantoprazole, 40 mg, intravenous, Daily before breakfast  polyethylene glycol, 17 g, oral, Daily  thyroid (pork), 60 mg, oral, TID      Continuous medications     PRN medications  PRN medications: acetaminophen, bisacodyl, dextrose 10 % in water (D10W), dextrose, glucagon, guaiFENesin, metoprolol, ondansetron **OR** ondansetron              Assessment/Plan   Principal Problem:    Atrial fibrillation with rapid ventricular response (CMS/HCC)  Active Problems:    Pneumonia due to COVID-19 virus    Type 2 diabetes mellitus (CMS/HCC)    Generalized weakness    Acute metabolic encephalopathy           Continue on apixaban 2.5 mg twice daily  Metoprolol 50 mg twice a day  Metoprolol  mg daily on hold/home med  Losartan restarted.  Melatonin as needed  Low-dose sliding scale insulin  [place] Thyroid 60 mg daily  Paxlovid pack  Continue to monitor on medical floor with telemetry  PT and OT  Increase activity  Monitor oxygen requirements  Discharge planning  See orders for complete plan  Check labs in a.m.  See orders for complete plan      I spent 35 minutes in the professional and overall care of this patient.      Camille Ovalle MD

## 2023-12-02 NOTE — CARE PLAN
The patient's goals for the shift include      The clinical goals for the shift include cwill be free from injury this shift    Over the shift, the patient remained free from injury.    croupy cough diff breath

## 2023-12-02 NOTE — PROGRESS NOTES
Speech-Language Pathology Clinical Swallow Treatment    Patient Name: Kaitlin Sarkar  MRN: [id number]  : 10/18/1930  Today's Date: 23  Start time: Start Time: 8  Stop time: Stop Time: 1504  Time calculation (min) : Time Calculation (min): 26 min    ASSESSMENT  SLP TX Intervention Outcome: Making Progress Towards Goals  SLP Assessment Results: Cognitive Deficits, Safety/judgement deficits  Treatment Tolerance: Patient tolerated treatment well    PLAN  Recommended solid consistency: Soft/bite-sized  Recommended liquid consistency: Thin (IDDSI 0)  Recommended medication administration: Whole in thin liquid  Safe swallow strategies: Upright positioning for all PO intake Slow rate of intake Do not feed unless pt is fully alert and upright  Discharge recommendation: Recommend LOW intensity ST upon DC in order to ensure safety with least restrictive diet.  Inpatient/Swing Bed or Outpatient: Inpatient  SLP TX Plan: Continue Plan of Care  SLP Plan: Skilled SLP  SLP Frequency: 2x per week  Duration: 2 weeks  Next Treatment Priority: continue to assess swallow function for regular textures and thin liquids.  Discussed POC: Patient, Nursing, Physician  Patient/Caregiver Agreeable: Yes    SUBJECTIVE  Prior to Session Communication: Bedside nurse  RN cleared pt to participate in session and reported that pt had a coughing episode earlier this date.  Pt's daughter was present during session and reported that pt had 2 productive coughing episodes but that she does not recall either of them being related to PO intake.  Respiratory status: Room air  Positioning: Upright in chair  Pt was alert, pleasant, and cooperative for session.    Pain Assessment: 0-10  Pain Score: 0/10 (pt reported pain was 0/10 but requested Tylenol; pt's daughter reported that she was recently c/o back pain)       OBJECTIVE  Swallow reassessment:  Therapeutic Swallow  Therapeutic Swallow Intervention : PO Trials, Caregiver Education  Solid Diet  Recommendations: Soft & bite sized/chopped (IDDSI Level 6)  Liquid Diet Recommendations: Thin (IDDSI Level 0)  Swallow Comments: Swallow reassessment completed. Pt consumed sof/bite-sized solids x5 bites, then reported she did not want any more due to nausea. Mastication appeared grossly adequate, with no oral residuals seen between bites. Pt again with suspected delayed or absent pharyngeal swallow based on palpation after some bites. No overt s/sx aspiration/penetration were observed. Education was completed to pt's daughter that diet texture should remain soft at this time due to pt's impaired cognition with concern for possible pooling of solid boluses in the pharynx between bites. Pt may benefit from instrumental swallow evaluation, however this is unable to be completed at this facility while she is Covid+. SLP will continue to assess and make further recommendations as appropriate.    Treatment/Education:  Results and recommendations were relayed to: Patient's daughter  Education provided: Yes   Learner: Child   Barriers to learning: None   Method of teaching: Verbal   Topic: Role of ST, results of assessment, risk for aspiration, recommended diet modifications, and recommendation for dysphagia follow-up   Outcome of teaching: Family verbalized understanding    Goals:  1. Pt will consume prescribed diet (current diet is Soft bite-sized diet and THIN ) without overt s/sx aspiration/penetration >95% of the time.  2. Pt will consume trials of upgraded textures without overt s/sx aspiration in order for consideration of diet texture upgrade.

## 2023-12-02 NOTE — PROGRESS NOTES
Kaitlin Sarkar is a 89 y.o. female on day 3 of admission presenting with Atrial fibrillation with rapid ventricular response (CMS/HCC).    Subjective   Kaitlin Irwin is a 89 y.o. female presenting with the above.  The patient presents from home with her daughter who states that she has been crying and very weak.  She brought her to the hospital where she was found to be also very confused, and her daughter states that this is not her norm.  At present the patient is only oriented to person, and unable to contribute to this history.  The patient does have chronic atrial fibrillation and is on a blood thinner.  We are unsure what this blood thinner is at this point.  She also has type 2 diabetes mellitus which according to the ER physician is being diet controlled.  The patient does not appear to be in any distress at this time.  She presents for further evaluation.     11/27: Patient is weak but not hypoxic.  Patient and daughter had talked and since patient is just sitting in the ER not having much done they feel they can do just as well if not better by taking her home.  We will have her discharged home with some home health care at this time.  Turn to ER if symptoms worsen.     11/28: Kaitlin Sarkar is a 89 y.o. female with PMHx s/f HTN, chronic atrial fibrillation on Eliquis, T2DM, and admission for generalized weakness/COVID-19/confusion (11/26-11/27) at [facility], presenting with worsening confusion and worsening generalized weakness.  Patient was discharged on 11/27/2023 following a conversation between the patient's family and the inpatient physician, at that time family was feeling that due to the patient boarding in the ER, she would be better cared for at home.  After discharge, the patient went back to her house and family was unable to get her out of the car due to her worsening weakness.  They also report that her mentation worsened during this time even since discharge from the ED, to where she is more  confused and not as alert.  Patient does not provide any information at this time due to her encephalopathy in setting of COVID-19 most likely also it is unclear if she has a history of dementia.  Upon my review interview with the patient she would not respond to any commands or answer any questions and nursing staff had noted that this is how she has been since arrival.  Nursing staff noted that she will awaken to voice but not following commands which is what my assessment had established.  I did attempt to call the patient's daughter but this went to voicemail.  I was unable to complete any information regarding the patient's histories, review of systems or history of present illness at this time.  While in the ED she was noted to be in atrial fibrillation with rapid ventricular response with the highest documented rate at 118 at which time she received a one-time dose of 5 mg IV Lopressor and then a one-time dose of 25 mg p.o. Lopressor.     ED Course (Summary):   Vitals on presentation: T98.8, /120 --> 112/77,  --> 90, RR 20, SpO2 95% RA   CBC with differential: WBC 10.8, Hb 15.9, platelets 127  Chemistry panel: Glucose 177, sodium 131, testing 4.0, BUN 20, serum creatinine 1.01, mag 1.71  High-sensitivity troponin 39-repeat pending.  ECG initially atrial fibrillation with RVR which improved to heart rate of 90 following IV metoprolol.  Imaging and results from 11/26/2023-11/27/2023 reviewed.  CXR was with a patchy right basilar airspace opacity with pneumonia not been excluded (ID consultation also reviewed, given negative procal and no fever decision was made to monitor off antibiotics).  CT head was consistent with chronic changes, nothing acute.  COVID was positive days ago now.     11/28: Addendum patient resting comfortably heart rate is relatively well controlled feel she would do fine on a medical floor with telemetry.  Cardiology recommends increasing metoprolol for rate control continue  apixaban.  At this time have restarted patient's Paxlovid.  Okay to go to med telemetry patient states she feels better already.  Work on PT and OT with supportive care.    11/29: Patient's heart rate this morning elevated prior to getting her Lopressor orally.  Patient moving better with therapy no shortness of breath just malaise.  Continue to work on getting her beta-blocker titrated back up.  Patient had missed several doses of her metoprolol  mg a day prior to coming in due to illness and being stuck in the ER.  Continue with conservative care finishing up Paxlovid PT OT discharge planning skilled nursing facility for rehab.  We will check labs in a.m. patient to be seen by my associate  tomorrow    11/30: Patient was seen and examined.  Blood pressure and heart rate are better controlled today.  Patient was being seen by swallow therapist when I saw and attempting thin liquids with bite sized food as ordered.  She feels stronger according to her today although still generally very weak overall.  Seen by PT OT who recommend moderate intensity SNF.  Currently awaiting preauthorization for placement in an extended care facility.    12/1: Patient was seen and examined.  She has no new complaints today.  Tolerated her meals today without nausea or vomiting.  Currently awaiting preauthorization for placement in an extended care facility.    Objective     Physical Exam  Constitutional:       Appearance: Normal appearance.   HENT:      Head: Normocephalic.      Right Ear: External ear normal.      Left Ear: External ear normal.      Nose: Rhinorrhea present.      Mouth/Throat:      Mouth: Mucous membranes are moist.   Eyes:      Extraocular Movements: Extraocular movements intact.      Conjunctiva/sclera: Conjunctivae normal.      Pupils: Pupils are equal, round, and reactive to light.   Cardiovascular:      Rate and Rhythm: Normal rate. Rhythm irregular.      Heart sounds: No murmur  heard.  Pulmonary:      Effort: Pulmonary effort is normal.      Breath sounds: Normal breath sounds.   Abdominal:      General: Abdomen is flat.      Palpations: Abdomen is soft.   Musculoskeletal:      Cervical back: Normal range of motion.      Left lower leg: Edema present.      Comments: Significant kyphosis of the spine   Skin:     General: Skin is warm.   Neurological:      General: No focal deficit present.      Mental Status: She is alert.      Comments: General frailty and weakness.   Psychiatric:         Mood and Affect: Mood normal.     Patient is pleasant states she feels better    Last Recorded Vitals  Blood pressure 127/78, pulse 83, temperature 36.6 °C (97.8 °F), temperature source Temporal, resp. rate 16, height 1.524 m (5'), weight 55.8 kg (123 lb), SpO2 92 %.  Intake/Output last 3 Shifts:  I/O last 3 completed shifts:  In: 716 (12.8 mL/kg) [P.O.:716]  Out: 450 (8.1 mL/kg) [Urine:450 (0.2 mL/kg/hr)]  Weight: 55.8 kg     Relevant Results                  Scheduled medications  apixaban, 2.5 mg, oral, BID  insulin lispro, 0-10 Units, subcutaneous, Before meals & nightly  losartan, 25 mg, oral, Daily  melatonin, 3 mg, oral, Daily  metoprolol succinate XL, 100 mg, oral, Daily  metoprolol tartrate, 50 mg, oral, BID  [Held by provider] nirmatrelvir-ritonavir, 2 tablet, oral, BID  pantoprazole, 40 mg, oral, Daily before breakfast   Or  pantoprazole, 40 mg, intravenous, Daily before breakfast  polyethylene glycol, 17 g, oral, Daily  thyroid (pork), 60 mg, oral, TID      Continuous medications     PRN medications  PRN medications: acetaminophen, bisacodyl, dextrose 10 % in water (D10W), dextrose, glucagon, guaiFENesin, metoprolol, ondansetron **OR** ondansetron             Assessment/Plan   Principal Problem:    Atrial fibrillation with rapid ventricular response (CMS/HCC)  Active Problems:    Pneumonia due to COVID-19 virus    Type 2 diabetes mellitus (CMS/HCC)    Generalized weakness    Acute metabolic  encephalopathy           Continue on apixaban 2.5 mg twice daily  Metoprolol 50 mg twice a day  Metoprolol  mg daily on hold/home med  Losartan restarted.  Melatonin as needed  Low-dose sliding scale insulin  [place] Thyroid 60 mg daily  Paxlovid pack  Continue to monitor on medical floor with telemetry  PT and OT  Increase activity  Monitor oxygen requirements  Discharge planning  See orders for complete plan  Check labs in a.m.  See orders for complete plan      I spent 35 minutes in the professional and overall care of this patient.      Camille Ovalle MD

## 2023-12-03 LAB
GLUCOSE BLD MANUAL STRIP-MCNC: 103 MG/DL (ref 74–99)
GLUCOSE BLD MANUAL STRIP-MCNC: 117 MG/DL (ref 74–99)
GLUCOSE BLD MANUAL STRIP-MCNC: 133 MG/DL (ref 74–99)
GLUCOSE BLD MANUAL STRIP-MCNC: 135 MG/DL (ref 74–99)

## 2023-12-03 PROCEDURE — 2500000001 HC RX 250 WO HCPCS SELF ADMINISTERED DRUGS (ALT 637 FOR MEDICARE OP): Performed by: INTERNAL MEDICINE

## 2023-12-03 PROCEDURE — 82947 ASSAY GLUCOSE BLOOD QUANT: CPT

## 2023-12-03 PROCEDURE — 99233 SBSQ HOSP IP/OBS HIGH 50: CPT | Performed by: INTERNAL MEDICINE

## 2023-12-03 PROCEDURE — 2500000001 HC RX 250 WO HCPCS SELF ADMINISTERED DRUGS (ALT 637 FOR MEDICARE OP): Performed by: STUDENT IN AN ORGANIZED HEALTH CARE EDUCATION/TRAINING PROGRAM

## 2023-12-03 PROCEDURE — 2500000004 HC RX 250 GENERAL PHARMACY W/ HCPCS (ALT 636 FOR OP/ED): Performed by: STUDENT IN AN ORGANIZED HEALTH CARE EDUCATION/TRAINING PROGRAM

## 2023-12-03 PROCEDURE — 1200000002 HC GENERAL ROOM WITH TELEMETRY DAILY

## 2023-12-03 RX ADMIN — PANTOPRAZOLE SODIUM 40 MG: 40 TABLET, DELAYED RELEASE ORAL at 06:07

## 2023-12-03 RX ADMIN — APIXABAN 2.5 MG: 5 TABLET, FILM COATED ORAL at 08:39

## 2023-12-03 RX ADMIN — LOSARTAN POTASSIUM 25 MG: 25 TABLET, FILM COATED ORAL at 08:39

## 2023-12-03 RX ADMIN — METOPROLOL SUCCINATE 100 MG: 50 TABLET, EXTENDED RELEASE ORAL at 08:39

## 2023-12-03 RX ADMIN — METOPROLOL TARTRATE 50 MG: 50 TABLET, FILM COATED ORAL at 08:38

## 2023-12-03 RX ADMIN — APIXABAN 2.5 MG: 5 TABLET, FILM COATED ORAL at 21:51

## 2023-12-03 RX ADMIN — LEVOTHYROXINE, LIOTHYRONINE 60 MG: 38; 9 TABLET ORAL at 08:38

## 2023-12-03 RX ADMIN — LEVOTHYROXINE, LIOTHYRONINE 60 MG: 38; 9 TABLET ORAL at 15:34

## 2023-12-03 ASSESSMENT — COGNITIVE AND FUNCTIONAL STATUS - GENERAL
PERSONAL GROOMING: A LITTLE
DRESSING REGULAR LOWER BODY CLOTHING: A LOT
WALKING IN HOSPITAL ROOM: A LOT
HELP NEEDED FOR BATHING: A LOT
MOVING TO AND FROM BED TO CHAIR: A LOT
PERSONAL GROOMING: A LITTLE
TOILETING: A LOT
STANDING UP FROM CHAIR USING ARMS: A LITTLE
EATING MEALS: A LITTLE
MOBILITY SCORE: 14
TURNING FROM BACK TO SIDE WHILE IN FLAT BAD: A LITTLE
DRESSING REGULAR UPPER BODY CLOTHING: A LOT
MOBILITY SCORE: 14
DRESSING REGULAR LOWER BODY CLOTHING: A LOT
DAILY ACTIVITIY SCORE: 14
MOVING FROM LYING ON BACK TO SITTING ON SIDE OF FLAT BED WITH BEDRAILS: A LITTLE
TOILETING: A LOT
MOVING FROM LYING ON BACK TO SITTING ON SIDE OF FLAT BED WITH BEDRAILS: A LITTLE
HELP NEEDED FOR BATHING: A LOT
CLIMB 3 TO 5 STEPS WITH RAILING: TOTAL
CLIMB 3 TO 5 STEPS WITH RAILING: TOTAL
STANDING UP FROM CHAIR USING ARMS: A LITTLE
DAILY ACTIVITIY SCORE: 14
TURNING FROM BACK TO SIDE WHILE IN FLAT BAD: A LITTLE
WALKING IN HOSPITAL ROOM: A LOT
MOVING TO AND FROM BED TO CHAIR: A LOT
DRESSING REGULAR UPPER BODY CLOTHING: A LOT
EATING MEALS: A LITTLE

## 2023-12-03 ASSESSMENT — PAIN SCALES - GENERAL: PAINLEVEL_OUTOF10: 0 - NO PAIN

## 2023-12-03 NOTE — CARE PLAN
Problem: Fall/Injury  Goal: Not fall by end of shift  Outcome: Progressing  Goal: Be free from injury by end of the shift  Outcome: Progressing  Goal: Verbalize understanding of personal risk factors for fall in the hospital  Outcome: Progressing  Goal: Verbalize understanding of risk factor reduction measures to prevent injury from fall in the home  Outcome: Progressing  Goal: Use assistive devices by end of the shift  Outcome: Progressing  Goal: Pace activities to prevent fatigue by end of the shift  Outcome: Progressing   The patient's goals for the shift include      The clinical goals for the shift include will be from falls and injury this shift    Over the shift, the patient did not make progress toward the following goals. Barriers to progression include n/a. Recommendations to address these barriers include n/a.

## 2023-12-03 NOTE — PROGRESS NOTES
Kaitlin Sarkar is a 89 y.o. female on day 5 of admission presenting with Atrial fibrillation with rapid ventricular response (CMS/HCC).    Subjective   Kaitlin Irwin is a 89 y.o. female presenting with the above.  The patient presents from home with her daughter who states that she has been crying and very weak.  She brought her to the hospital where she was found to be also very confused, and her daughter states that this is not her norm.  At present the patient is only oriented to person, and unable to contribute to this history.  The patient does have chronic atrial fibrillation and is on a blood thinner.  We are unsure what this blood thinner is at this point.  She also has type 2 diabetes mellitus which according to the ER physician is being diet controlled.  The patient does not appear to be in any distress at this time.  She presents for further evaluation.     11/27: Patient is weak but not hypoxic.  Patient and daughter had talked and since patient is just sitting in the ER not having much done they feel they can do just as well if not better by taking her home.  We will have her discharged home with some home health care at this time.  Turn to ER if symptoms worsen.     11/28: Kaitlin Sarkar is a 89 y.o. female with PMHx s/f HTN, chronic atrial fibrillation on Eliquis, T2DM, and admission for generalized weakness/COVID-19/confusion (11/26-11/27) at [facility], presenting with worsening confusion and worsening generalized weakness.  Patient was discharged on 11/27/2023 following a conversation between the patient's family and the inpatient physician, at that time family was feeling that due to the patient boarding in the ER, she would be better cared for at home.  After discharge, the patient went back to her house and family was unable to get her out of the car due to her worsening weakness.  They also report that her mentation worsened during this time even since discharge from the ED, to where she is more  confused and not as alert.  Patient does not provide any information at this time due to her encephalopathy in setting of COVID-19 most likely also it is unclear if she has a history of dementia.  Upon my review interview with the patient she would not respond to any commands or answer any questions and nursing staff had noted that this is how she has been since arrival.  Nursing staff noted that she will awaken to voice but not following commands which is what my assessment had established.  I did attempt to call the patient's daughter but this went to voicemail.  I was unable to complete any information regarding the patient's histories, review of systems or history of present illness at this time.  While in the ED she was noted to be in atrial fibrillation with rapid ventricular response with the highest documented rate at 118 at which time she received a one-time dose of 5 mg IV Lopressor and then a one-time dose of 25 mg p.o. Lopressor.     ED Course (Summary):   Vitals on presentation: T98.8, /120 --> 112/77,  --> 90, RR 20, SpO2 95% RA   CBC with differential: WBC 10.8, Hb 15.9, platelets 127  Chemistry panel: Glucose 177, sodium 131, testing 4.0, BUN 20, serum creatinine 1.01, mag 1.71  High-sensitivity troponin 39-repeat pending.  ECG initially atrial fibrillation with RVR which improved to heart rate of 90 following IV metoprolol.  Imaging and results from 11/26/2023-11/27/2023 reviewed.  CXR was with a patchy right basilar airspace opacity with pneumonia not been excluded (ID consultation also reviewed, given negative procal and no fever decision was made to monitor off antibiotics).  CT head was consistent with chronic changes, nothing acute.  COVID was positive days ago now.     11/28: Addendum patient resting comfortably heart rate is relatively well controlled feel she would do fine on a medical floor with telemetry.  Cardiology recommends increasing metoprolol for rate control continue  apixaban.  At this time have restarted patient's Paxlovid.  Okay to go to med telemetry patient states she feels better already.  Work on PT and OT with supportive care.    11/29: Patient's heart rate this morning elevated prior to getting her Lopressor orally.  Patient moving better with therapy no shortness of breath just malaise.  Continue to work on getting her beta-blocker titrated back up.  Patient had missed several doses of her metoprolol  mg a day prior to coming in due to illness and being stuck in the ER.  Continue with conservative care finishing up Paxlovid PT OT discharge planning skilled nursing facility for rehab.  We will check labs in a.m. patient to be seen by my associate  tomorrow    11/30: Patient was seen and examined.  Blood pressure and heart rate are better controlled today.  Patient was being seen by swallow therapist when I saw and attempting thin liquids with bite sized food as ordered.  She feels stronger according to her today although still generally very weak overall.  Seen by PT OT who recommend moderate intensity SNF.  Currently awaiting preauthorization for placement in an extended care facility.    12/1: Patient was seen and examined.  She has no new complaints today.  Tolerated her meals today without nausea or vomiting.  Currently awaiting preauthorization for placement in an extended care facility.  12/2: Patient was seen and examined.  Continues to cough intermittently but however feels better.  She continues to tolerate orally.  Discussed extensively with daughter at bedside about the plan of care.  Currently awaiting preauthorization for placement in extended-care facility.    12/3: Patient was seen and examined.  Still having poor appetite overall although attempting to eat more clearly with daughter's encouragement at bedside.  Has no new complaints.  Currently awaiting authorization for placement.      Objective     Physical Exam  Constitutional:        Appearance: Normal appearance.   HENT:      Head: Normocephalic.      Right Ear: External ear normal.      Left Ear: External ear normal.      Nose: Rhinorrhea present.      Mouth/Throat:      Mouth: Mucous membranes are moist.   Eyes:      Extraocular Movements: Extraocular movements intact.      Conjunctiva/sclera: Conjunctivae normal.      Pupils: Pupils are equal, round, and reactive to light.   Cardiovascular:      Rate and Rhythm: Normal rate. Rhythm irregular.      Heart sounds: No murmur heard.  Pulmonary:      Effort: Pulmonary effort is normal.      Breath sounds: Normal breath sounds.   Abdominal:      General: Abdomen is flat.      Palpations: Abdomen is soft.   Musculoskeletal:      Cervical back: Normal range of motion.      Left lower leg: Edema present.      Comments: Significant kyphosis of the spine   Skin:     General: Skin is warm.   Neurological:      General: No focal deficit present.      Mental Status: She is alert.      Comments: General frailty and weakness.   Psychiatric:         Mood and Affect: Mood normal.     Patient is pleasant states she feels better    Last Recorded Vitals  Blood pressure 127/78, pulse 79, temperature 36.9 °C (98.5 °F), temperature source Temporal, resp. rate 17, height 1.524 m (5'), weight 55.8 kg (123 lb), SpO2 95 %.  Intake/Output last 3 Shifts:  I/O last 3 completed shifts:  In: 440 (7.9 mL/kg) [P.O.:440]  Out: - (0 mL/kg)   Weight: 55.8 kg     Relevant Results                  Scheduled medications  apixaban, 2.5 mg, oral, BID  insulin lispro, 0-10 Units, subcutaneous, Before meals & nightly  losartan, 25 mg, oral, Daily  melatonin, 3 mg, oral, Daily  metoprolol succinate XL, 100 mg, oral, Daily  metoprolol tartrate, 50 mg, oral, BID  [Held by provider] nirmatrelvir-ritonavir, 2 tablet, oral, BID  pantoprazole, 40 mg, oral, Daily before breakfast   Or  pantoprazole, 40 mg, intravenous, Daily before breakfast  polyethylene glycol, 17 g, oral, Daily  thyroid  (pork), 60 mg, oral, TID      Continuous medications     PRN medications  PRN medications: acetaminophen, bisacodyl, dextrose 10 % in water (D10W), dextrose, glucagon, guaiFENesin, metoprolol, ondansetron **OR** ondansetron             Assessment/Plan   Principal Problem:    Atrial fibrillation with rapid ventricular response (CMS/HCC)  Active Problems:    Pneumonia due to COVID-19 virus    Type 2 diabetes mellitus (CMS/HCC)    Generalized weakness    Acute metabolic encephalopathy           Continue on apixaban 2.5 mg twice daily  Metoprolol 50 mg twice a day  Metoprolol  mg daily on hold/home med  Losartan restarted.  Melatonin as needed  Low-dose sliding scale insulin  [place] Thyroid 60 mg daily  Paxlovid pack  Continue to monitor on medical floor with telemetry  PT and OT  Increase activity  Monitor oxygen requirements  Discharge planning  See orders for complete plan  Check labs in a.m.  See orders for complete plan      I spent 35 minutes in the professional and overall care of this patient.      Camille Ovalle MD

## 2023-12-03 NOTE — CARE PLAN
Problem: Fall/Injury  Goal: Not fall by end of shift  Outcome: Met   The patient's goals for the shift include      The clinical goals for the shift include Will be free from falls    Over the shift, the patient was free from falls.

## 2023-12-03 NOTE — CARE PLAN
The patient's goals for the shift include      The clinical goals for the shift include Will be free from falls    Over the shift, the patient was free from falls this shift.

## 2023-12-03 NOTE — CARE PLAN
The patient's goals for the shift include      The clinical goals for the shift include will be from falls and injury this shift    Over the shift, the patient did not make progress toward the following goals. Barriers to progression include n/a. Recommendations to address these barriers include n/a.

## 2023-12-04 LAB
GLUCOSE BLD MANUAL STRIP-MCNC: 100 MG/DL (ref 74–99)
GLUCOSE BLD MANUAL STRIP-MCNC: 104 MG/DL (ref 74–99)
GLUCOSE BLD MANUAL STRIP-MCNC: 136 MG/DL (ref 74–99)
GLUCOSE BLD MANUAL STRIP-MCNC: 146 MG/DL (ref 74–99)

## 2023-12-04 PROCEDURE — 82947 ASSAY GLUCOSE BLOOD QUANT: CPT

## 2023-12-04 PROCEDURE — 92526 ORAL FUNCTION THERAPY: CPT | Mod: GN | Performed by: SPEECH-LANGUAGE PATHOLOGIST

## 2023-12-04 PROCEDURE — 2500000004 HC RX 250 GENERAL PHARMACY W/ HCPCS (ALT 636 FOR OP/ED): Performed by: STUDENT IN AN ORGANIZED HEALTH CARE EDUCATION/TRAINING PROGRAM

## 2023-12-04 PROCEDURE — 97116 GAIT TRAINING THERAPY: CPT | Mod: GP,CQ

## 2023-12-04 PROCEDURE — 2500000001 HC RX 250 WO HCPCS SELF ADMINISTERED DRUGS (ALT 637 FOR MEDICARE OP): Performed by: INTERNAL MEDICINE

## 2023-12-04 PROCEDURE — 97530 THERAPEUTIC ACTIVITIES: CPT | Mod: GO,CO

## 2023-12-04 PROCEDURE — 99233 SBSQ HOSP IP/OBS HIGH 50: CPT | Performed by: INTERNAL MEDICINE

## 2023-12-04 PROCEDURE — 2500000001 HC RX 250 WO HCPCS SELF ADMINISTERED DRUGS (ALT 637 FOR MEDICARE OP): Performed by: STUDENT IN AN ORGANIZED HEALTH CARE EDUCATION/TRAINING PROGRAM

## 2023-12-04 PROCEDURE — 2500000005 HC RX 250 GENERAL PHARMACY W/O HCPCS: Performed by: STUDENT IN AN ORGANIZED HEALTH CARE EDUCATION/TRAINING PROGRAM

## 2023-12-04 PROCEDURE — 1200000002 HC GENERAL ROOM WITH TELEMETRY DAILY

## 2023-12-04 PROCEDURE — 97530 THERAPEUTIC ACTIVITIES: CPT | Mod: GP,CQ

## 2023-12-04 PROCEDURE — 97535 SELF CARE MNGMENT TRAINING: CPT | Mod: GO,CO

## 2023-12-04 RX ADMIN — ONDANSETRON HYDROCHLORIDE 4 MG: 4 TABLET, FILM COATED ORAL at 14:12

## 2023-12-04 RX ADMIN — LEVOTHYROXINE, LIOTHYRONINE 60 MG: 38; 9 TABLET ORAL at 14:12

## 2023-12-04 RX ADMIN — METOPROLOL TARTRATE 50 MG: 50 TABLET, FILM COATED ORAL at 20:59

## 2023-12-04 RX ADMIN — LEVOTHYROXINE, LIOTHYRONINE 60 MG: 38; 9 TABLET ORAL at 09:04

## 2023-12-04 RX ADMIN — APIXABAN 2.5 MG: 5 TABLET, FILM COATED ORAL at 20:59

## 2023-12-04 RX ADMIN — Medication 3 MG: at 20:59

## 2023-12-04 RX ADMIN — APIXABAN 2.5 MG: 5 TABLET, FILM COATED ORAL at 09:04

## 2023-12-04 RX ADMIN — LEVOTHYROXINE, LIOTHYRONINE 60 MG: 38; 9 TABLET ORAL at 20:59

## 2023-12-04 RX ADMIN — LOSARTAN POTASSIUM 25 MG: 25 TABLET, FILM COATED ORAL at 09:10

## 2023-12-04 RX ADMIN — METOPROLOL TARTRATE 50 MG: 50 TABLET, FILM COATED ORAL at 09:04

## 2023-12-04 RX ADMIN — PANTOPRAZOLE SODIUM 40 MG: 40 TABLET, DELAYED RELEASE ORAL at 06:07

## 2023-12-04 RX ADMIN — METOPROLOL SUCCINATE 100 MG: 50 TABLET, EXTENDED RELEASE ORAL at 09:03

## 2023-12-04 ASSESSMENT — COGNITIVE AND FUNCTIONAL STATUS - GENERAL
HELP NEEDED FOR BATHING: A LOT
STANDING UP FROM CHAIR USING ARMS: A LOT
MOVING FROM LYING ON BACK TO SITTING ON SIDE OF FLAT BED WITH BEDRAILS: A LITTLE
PERSONAL GROOMING: A LITTLE
PERSONAL GROOMING: A LITTLE
CLIMB 3 TO 5 STEPS WITH RAILING: TOTAL
DRESSING REGULAR LOWER BODY CLOTHING: A LOT
DAILY ACTIVITIY SCORE: 15
MOBILITY SCORE: 13
MOVING TO AND FROM BED TO CHAIR: A LOT
DAILY ACTIVITIY SCORE: 15
TOILETING: A LITTLE
TOILETING: A LITTLE
TURNING FROM BACK TO SIDE WHILE IN FLAT BAD: A LITTLE
DRESSING REGULAR UPPER BODY CLOTHING: A LOT
STANDING UP FROM CHAIR USING ARMS: A LOT
WALKING IN HOSPITAL ROOM: A LOT
DRESSING REGULAR LOWER BODY CLOTHING: A LOT
CLIMB 3 TO 5 STEPS WITH RAILING: TOTAL
MOVING FROM LYING ON BACK TO SITTING ON SIDE OF FLAT BED WITH BEDRAILS: A LITTLE
EATING MEALS: A LITTLE
MOBILITY SCORE: 13
HELP NEEDED FOR BATHING: A LOT
EATING MEALS: A LITTLE
WALKING IN HOSPITAL ROOM: A LOT
TURNING FROM BACK TO SIDE WHILE IN FLAT BAD: A LITTLE
MOVING TO AND FROM BED TO CHAIR: A LOT
DRESSING REGULAR UPPER BODY CLOTHING: A LOT

## 2023-12-04 ASSESSMENT — PAIN SCALES - WONG BAKER: WONGBAKER_NUMERICALRESPONSE: NO HURT

## 2023-12-04 ASSESSMENT — PAIN SCALES - GENERAL
PAINLEVEL_OUTOF10: 0 - NO PAIN

## 2023-12-04 ASSESSMENT — PAIN - FUNCTIONAL ASSESSMENT
PAIN_FUNCTIONAL_ASSESSMENT: 0-10

## 2023-12-04 ASSESSMENT — ACTIVITIES OF DAILY LIVING (ADL): HOME_MANAGEMENT_TIME_ENTRY: 12

## 2023-12-04 NOTE — CARE PLAN
Patient is awaiting discharge. ID will sign out.   Please reconsult with questions       Freda Barrientos ID  Pager:[phone]  Date of service: 12/4/2023  Time of service: 11:08 AM

## 2023-12-04 NOTE — PROGRESS NOTES
Occupational Therapy    OT Treatment    Patient Name: Kaitlin Sarkar  MRN: [id number]  Today's Date: 12/4/2023  Time Calculation  Start Time: 1148  Stop Time: 1212  Time Calculation (min): 24 min         Assessment:  End of Session Patient Position: Up in chair, Alarm on       Plan:  Treatment Interventions: ADL retraining, Functional transfer training, UE strengthening/ROM, Endurance training  Treatment Interventions: ADL retraining, Functional transfer training, UE strengthening/ROM, Endurance training  Subjective     Current Problem:  Patient Active Problem List   Diagnosis    Pneumonia due to COVID-19 virus    Chronic atrial fibrillation (CMS/HCC)    Type 2 diabetes mellitus (CMS/HCC)    Altered mental status    Generalized weakness    Atrial fibrillation with rapid ventricular response (CMS/HCC)    Acute metabolic encephalopathy       General:     Co-Treatment: PT  Co-Treatment Reason: to optimize safety and mobility, while focusing on discipline specific goals  Prior to Session Communication: Bedside nurse  Patient Position Received: Up in chair (with daughter at bed side)  General Comment: Pt. agreeable to OT. She is MIN A x1 for functional mobility around room CGA for toileting and grooming. MIN A for UB dressing patient struggled problem solving. Per daughter patient is worse since being hospitalized was independent.    Vital Signs:       Pain:  Pain Assessment  Pain Assessment: 0-10  Pain Score: 0 - No pain (only nauseated)  Objective      Activities of Daily Living:    Grooming  Grooming Level of Assistance: Contact guard  Grooming Where Assessed: Standing sinkside        UE Dressing  UE Dressing Level of Assistance: Minimum assistance  UE Dressing Where Assessed: Chair  UE Dressing Comments: cued for tech. pt. unable to problem solve     Toileting  Toileting Level of Assistance: Contact guard  Where Assessed: Toilet, Bedside commode (toilet over BSC)  Toileting Comments: pt. wiping self and able to manage  own brief    Functional Standing Tolerance:  Time: 3 mins  Activity: good balance    Bed Mobility/Transfers:    Transfer 1  Technique 1: Sit to stand, Stand to sit  Transfer Device 1: Walker  Transfer Level of Assistance 1: Minimum assistance  Toilet Transfers  Toilet Transfer From: Chair  Toilet Transfer Type: To and from  Toilet Transfer to: Standard bedside commode  Toilet Transfer Technique: Ambulating  Toilet Transfers: Minimal assistance  Toilet Transfers Comments: cues for problem solving FWW in narrow bathroom                  Outcome Measures:[facility] Daily Activity  Putting on and taking off regular lower body clothing: A lot  Bathing (including washing, rinsing, drying): A lot  Putting on and taking off regular upper body clothing: A lot  Toileting, which includes using toilet, bedpan or urinal: A little  Taking care of personal grooming such as brushing teeth: A little  Eating Meals: A little  Daily Activity - Total Score: 15  Education Documentation  Body Mechanics, taught by DE Woods at 12/4/2023  2:18 PM.  Learner: Family, Patient  Readiness: Acceptance  Method: Explanation, Teach-back  Response: Verbalizes Understanding, Needs Reinforcement    Precautions, taught by DE Woods at 12/4/2023  2:18 PM.  Learner: Family, Patient  Readiness: Acceptance  Method: Explanation, Teach-back  Response: Verbalizes Understanding, Needs Reinforcement    ADL Training, taught by DE Woods at 12/4/2023  2:18 PM.  Learner: Family, Patient  Readiness: Acceptance  Method: Explanation, Teach-back  Response: Verbalizes Understanding, Needs Reinforcement    Education Comments  No comments found.        EDUCATION:       Goals:  Encounter Problems       Encounter Problems (Active)       ADLs       Patient with complete lower body dressing with modified independent level of assistance donning and doffing all LE clothes  with PRN adaptive equipment while supported sitting and standing (Not  Progressing)       Start:  11/28/23    Expected End:  12/12/23            Patient will complete daily grooming tasks brushing teeth and washing face/hair with modified independent level of assistance and PRN adaptive equipment while supported sitting and standing. (Progressing)       Start:  11/28/23    Expected End:  12/12/23               Mobility       STG - Patient will ambulate (Progressing)       Start:  11/28/23    Expected End:  12/12/23       FWW + FT         STRENGTHENING (Progressing)       Start:  11/28/23    Expected End:  12/19/23       20+ REPS RROM INCREASING STRENGTH TO STABILIZE OOB ACTIVITIES            TRANSFERS       Patient will complete functional transfers and functional mobility with least restrictive device with supervision level of assistance. (Progressing)       Start:  11/28/23    Expected End:  12/12/23

## 2023-12-04 NOTE — CARE PLAN
The patient's goals for the shift include      The clinical goals for the shift include remain free from falls and injury this shift    Over the shift, the patient did not make progress toward the following goals. Barriers to progression include n/a. Recommendations to address these barriers include n/a.

## 2023-12-04 NOTE — PROGRESS NOTES
Physical Therapy    Physical Therapy Treatment    Patient Name: Kaitlin Sarkar  MRN: [id number]  Today's Date: 12/4/2023  Time Calculation  Start Time: 1149  Stop Time: 1215  Time Calculation (min): 26 min       Assessment/Plan   PT Assessment  PT Assessment Results: Decreased strength, Decreased endurance, Decreased mobility  Rehab Prognosis: Good  Assessment Comment: pt up in chair upon arrival. pt required cues on hand placment with transfers. pt amb to restroom then in room. pt took a seated rest break in chair in between amb. pt reports feeling nausea. pt's daughter present.  End of Session Patient Position: Alarm on, Up in chair  PT Plan  Inpatient/Swing Bed or Outpatient: Inpatient  PT Plan  Treatment/Interventions: Bed mobility, Transfer training, Gait training, Strengthening, Therapeutic exercise  PT Plan: Skilled PT  PT Frequency: 4 times per week  PT Discharge Recommendations: Moderate intensity level of continued care  Equipment Recommended upon Discharge: Wheeled walker (TBD)  PT Recommended Transfer Status: Assist x2 (FWW)  PT - OK to Discharge: Yes (TO NEXT LOC WHEN MEDICALLY CLEARED)      General Visit Information:   PT  Visit  PT Received On: 12/04/23       Subjective   Precautions:     Vital Signs:       Objective   Pain:  Pain Assessment  Pain Score: 0 - No pain  Cognition:  Cognition  Orientation Level: Oriented X4    Treatments:       Ambulation/Gait Training 1  Surface 1: Level tile  Device 1: Rolling walker  Assistance 1: Minimum assistance  Quality of Gait 1:  (shuffling gait, small step length)  Comments/Distance (ft) 1: 12, 25 x2  Transfer 1  Technique 1: Sit to stand  Transfer Device 1: Walker  Transfer Level of Assistance 1: Minimum assistance  Trials/Comments 1: 2x from chair    Outcome Measures:  [facility] Basic Mobility  Turning from your back to your side while in a flat bed without using bedrails: A little  Moving from lying on your back to sitting on the side of a flat bed without using  bedrails: A little  Moving to and from bed to chair (including a wheelchair): A lot  Standing up from a chair using your arms (e.g. wheelchair or bedside chair): A lot  To walk in hospital room: A lot  Climbing 3-5 steps with railing: Total  Basic Mobility - Total Score: 13    Education Documentation  Mobility Training, taught by Marlys Sams PTA at 12/4/2023  1:59 PM.  Learner: Patient  Readiness: Acceptance  Method: Demonstration  Response: Demonstrated Understanding    Education Comments  No comments found.        OP EDUCATION:       Encounter Problems       Encounter Problems (Active)       Mobility       STG - Patient will ambulate (Progressing)       Start:  11/28/23    Expected End:  12/12/23       FWW + FT         STRENGTHENING (Progressing)       Start:  11/28/23    Expected End:  12/19/23       20+ REPS RROM INCREASING STRENGTH TO STABILIZE OOB ACTIVITIES            Pain - Adult          Transfers       STG - Patient to transfer to and from sit to supine (Progressing)       Start:  11/28/23    Expected End:  12/12/23       MIN A NO RAILS         STG - Patient will transfer sit to and from stand (Progressing)       Start:  11/28/23    Expected End:  12/12/23       FWW CGA X1 A USIGN PROPER TECHNIQUE

## 2023-12-04 NOTE — PROGRESS NOTES
Speech-Language Pathology    Inpatient  Speech-Language Pathology Treatment     Patient Name: Kaitlin Sarkar  MRN: [id number]  Today's Date: 12/4/2023  Time Calculation  Start Time: 1450  Stop Time: 1510  Time Calculation (min): 20 min         Current Problem:   1. Generalized weakness        2. Pneumonia due to COVID-19 virus        3. Atrial fibrillation with RVR (CMS/HCC)        4. Atrial fibrillation with rapid ventricular response (CMS/HCC)        5. Chronic atrial fibrillation (CMS/HCC)  Transthoracic Echo (TTE) Complete    Transthoracic Echo (TTE) Complete      6. Oropharyngeal dysphagia [R13.12]              SLP Assessment:  SLP TX Intervention Outcome: Making Progress Towards Goals  SLP Assessment Results: Cognitive Deficits, Memory deficits  Prognosis: Good  Treatment Provided: Yes   Treatment Tolerance: Treatment limited secondary to medical complications (Pt c/o nausea over the last few days.)  Medical Staff Made Aware: Yes  Education Provided: Yes       Plan:  Treatment/Interventions:  (PO trials of advanced textures to resume regular diet texture.)  SLP TX Plan: Discharge from Speech Therapy (Resume SLP services at SNF when pt is feeling better.)  SLP Plan: Skilled SLP  SLP Frequency:  (when admitted to next level of care)  Duration:  (as determined by treating therapist)  SLP Discharge Recommendations: Continue skilled SLP services at the next level of care  Next Treatment Priority: Resume Skilled SLP services once pt admitted to SNF and feeling better.  Discussed POC: Patient, Caregiver/family  Discussed Risks/Benefits: Yes, Patient, Caregiver/Family  Patient/Caregiver Agreeable: Yes  SLP - OK to Discharge: Yes (when medically stable as determined by physician)  Current diet:  Continue Soft bite-sized diet and THIN Liquids.      Goals:  1. Pt will consume prescribed diet (current diet is Soft bite-sized diet and THIN ) without overt s/sx aspiration/penetration >95% of the time.  2. Pt will consume trials  "of upgraded textures without overt s/sx aspiration in order for consideration of diet texture upgrade.        Subjective   Current Problem:  Pt sitting up in a chair and dtr present.  Dtr reporting pt having nausea for the last few days and it is interfering with her appetite.   She did not c/o nausea last week when seen at dinner on Thursday 11/30.      Most Recent Visit:  SLP Received On: 12/04/23    General Visit Information:   Reason for Referral: concern for oropharyngeal dysphagia  Past Medical History Relevant to Rehab: admit for AFIB RVR, weakness. family unable to assist pt and pt unable to ambulate. pt recently seen in ED and DX COVID positive. DC home but family unable to get pt out of car d/t weakness so returned to ED.  PMH: dementia, HTN, AFIB, DM      Pain Assessment:   Pain Assessment: 0-10  Pain Score: 0 - No pain      Objective   Therapeutic Swallow:  Therapeutic Swallow Intervention : PO Trials  Solid Diet Recommendations: Soft & bite sized/chopped (IDDSI Level 6)  Liquid Diet Recommendations: Thin (IDDSI Level 0)  Swallow Comments: Pt sitting up in chair she is agreeable to PO trials of cubed pears and sips of thin liquid.  Pt fed herself.  Dtr reported that the RN was in earlier and gave pt antinausea medication.     Oral phase: Pt with slower rate than last week.  She consumed all four ounces of cubed pears in liquid ranging from 2 to 5 cubes per spoonful.  Single and multiple sips of water via straw.  She had good lip seal.  Slow mastication.  Piecemeal deglutition.  Min oral holding prior to the swallow intermittently.  Only trace to min oral residues noted and it was intermittent.    Pharyngeal phase: No overt coughing or choking during or after PO trials.  Pt had no c/o the food sticking in her throat or going down the \"wrong pipe\"    She refused advanced solids at this time.    Pt to remain on Soft bite-sized diet and THIN Liquids at this time.    No further SLP services indicated at this " time.    Recommend SLP services resume at SNF when pt is feeling better and ready for diet advancement to regular texture.        Inpatient Education:  Adult Inpatient Education  Individual(s) Educated: Patient, Child  Verbal Education : Continue the modified diet of soft bites and thin liquids to promote increased oral intake d/t fatigue and decreased endurance.  Skilled Nursing facility can work with pt on advancing diet when she is feeling better.  Risk and Benefits Discussed with Patient/Caregiver/Other: yes  Patient/Caregiver Demonstrated Understanding: yes  Plan of Care Discussed and Agreed Upon: yes  Patient Response to Education: Patient/Caregiver Verbalized Understanding of Information

## 2023-12-04 NOTE — PROGRESS NOTES
Met with patient and her daughter. They are both agreeable to [facility].     12/5 1000 Spoke to daughter whom states they want to discharge home with [facility] now. HHC list from [facility] will be sent to daughters phone [phone]. Will review list. Patient will also need a walker to go home.     12/5 1030 received call from daughter with choices, Referrals sent.     12/5 1118 Notified patients daughter that [facility] can accept patient. Answered all questions and verbalized understanding.      12/6 0950 Spoke with daughter Alma whom would like patient to discharge to [facility]. Answered all questions and verbalized understanding.  Notified Dr. Ovalle of change in discharge plan.     12/6 1158 Notified patients daughter and RN of transport at 1:30 to [facility]. Answered all questions and verbalized understanding.

## 2023-12-05 LAB
GLUCOSE BLD MANUAL STRIP-MCNC: 129 MG/DL (ref 74–99)
GLUCOSE BLD MANUAL STRIP-MCNC: 133 MG/DL (ref 74–99)
GLUCOSE BLD MANUAL STRIP-MCNC: 141 MG/DL (ref 74–99)
GLUCOSE BLD MANUAL STRIP-MCNC: 98 MG/DL (ref 74–99)

## 2023-12-05 PROCEDURE — 97535 SELF CARE MNGMENT TRAINING: CPT | Mod: GO,CO

## 2023-12-05 PROCEDURE — 2500000001 HC RX 250 WO HCPCS SELF ADMINISTERED DRUGS (ALT 637 FOR MEDICARE OP): Performed by: INTERNAL MEDICINE

## 2023-12-05 PROCEDURE — 94760 N-INVAS EAR/PLS OXIMETRY 1: CPT

## 2023-12-05 PROCEDURE — 97110 THERAPEUTIC EXERCISES: CPT | Mod: GP,CQ

## 2023-12-05 PROCEDURE — 97116 GAIT TRAINING THERAPY: CPT | Mod: GP,CQ

## 2023-12-05 PROCEDURE — 97530 THERAPEUTIC ACTIVITIES: CPT | Mod: GP,CQ

## 2023-12-05 PROCEDURE — 1210000001 HC SEMI-PRIVATE ROOM DAILY

## 2023-12-05 PROCEDURE — 2500000001 HC RX 250 WO HCPCS SELF ADMINISTERED DRUGS (ALT 637 FOR MEDICARE OP): Performed by: STUDENT IN AN ORGANIZED HEALTH CARE EDUCATION/TRAINING PROGRAM

## 2023-12-05 PROCEDURE — 2500000004 HC RX 250 GENERAL PHARMACY W/ HCPCS (ALT 636 FOR OP/ED): Performed by: INTERNAL MEDICINE

## 2023-12-05 PROCEDURE — 99233 SBSQ HOSP IP/OBS HIGH 50: CPT | Performed by: INTERNAL MEDICINE

## 2023-12-05 PROCEDURE — 2500000005 HC RX 250 GENERAL PHARMACY W/O HCPCS: Performed by: STUDENT IN AN ORGANIZED HEALTH CARE EDUCATION/TRAINING PROGRAM

## 2023-12-05 PROCEDURE — 82947 ASSAY GLUCOSE BLOOD QUANT: CPT

## 2023-12-05 RX ORDER — ALUMINUM HYDROXIDE, MAGNESIUM HYDROXIDE, AND SIMETHICONE 1200; 120; 1200 MG/30ML; MG/30ML; MG/30ML
20 SUSPENSION ORAL 4 TIMES DAILY PRN
Status: DISCONTINUED | OUTPATIENT
Start: 2023-12-05 | End: 2023-12-06 | Stop reason: HOSPADM

## 2023-12-05 RX ORDER — PANTOPRAZOLE SODIUM 40 MG/1
40 TABLET, DELAYED RELEASE ORAL
Status: DISCONTINUED | OUTPATIENT
Start: 2023-12-05 | End: 2023-12-06 | Stop reason: HOSPADM

## 2023-12-05 RX ADMIN — Medication 30 ML: at 20:00

## 2023-12-05 RX ADMIN — Medication 3 MG: at 19:42

## 2023-12-05 RX ADMIN — METOPROLOL SUCCINATE 100 MG: 50 TABLET, EXTENDED RELEASE ORAL at 10:33

## 2023-12-05 RX ADMIN — LEVOTHYROXINE, LIOTHYRONINE 60 MG: 38; 9 TABLET ORAL at 10:33

## 2023-12-05 RX ADMIN — METOPROLOL TARTRATE 50 MG: 50 TABLET, FILM COATED ORAL at 10:33

## 2023-12-05 RX ADMIN — PANTOPRAZOLE SODIUM 40 MG: 40 TABLET, DELAYED RELEASE ORAL at 18:37

## 2023-12-05 RX ADMIN — LOSARTAN POTASSIUM 25 MG: 25 TABLET, FILM COATED ORAL at 10:33

## 2023-12-05 RX ADMIN — APIXABAN 2.5 MG: 5 TABLET, FILM COATED ORAL at 10:33

## 2023-12-05 RX ADMIN — APIXABAN 2.5 MG: 5 TABLET, FILM COATED ORAL at 19:42

## 2023-12-05 RX ADMIN — ONDANSETRON HYDROCHLORIDE 4 MG: 4 TABLET, FILM COATED ORAL at 13:53

## 2023-12-05 RX ADMIN — METOPROLOL TARTRATE 50 MG: 50 TABLET, FILM COATED ORAL at 19:42

## 2023-12-05 RX ADMIN — LEVOTHYROXINE, LIOTHYRONINE 60 MG: 38; 9 TABLET ORAL at 18:37

## 2023-12-05 ASSESSMENT — PAIN - FUNCTIONAL ASSESSMENT: PAIN_FUNCTIONAL_ASSESSMENT: 0-10

## 2023-12-05 ASSESSMENT — PAIN SCALES - GENERAL
PAINLEVEL_OUTOF10: 3
PAINLEVEL_OUTOF10: 3

## 2023-12-05 ASSESSMENT — COGNITIVE AND FUNCTIONAL STATUS - GENERAL
CLIMB 3 TO 5 STEPS WITH RAILING: TOTAL
DAILY ACTIVITIY SCORE: 15
CLIMB 3 TO 5 STEPS WITH RAILING: TOTAL
MOBILITY SCORE: 13
STANDING UP FROM CHAIR USING ARMS: A LOT
TURNING FROM BACK TO SIDE WHILE IN FLAT BAD: A LITTLE
TURNING FROM BACK TO SIDE WHILE IN FLAT BAD: A LITTLE
EATING MEALS: A LITTLE
MOVING TO AND FROM BED TO CHAIR: A LOT
DRESSING REGULAR LOWER BODY CLOTHING: A LOT
HELP NEEDED FOR BATHING: A LOT
DRESSING REGULAR LOWER BODY CLOTHING: A LOT
MOVING TO AND FROM BED TO CHAIR: A LOT
TOILETING: A LITTLE
EATING MEALS: A LITTLE
MOVING FROM LYING ON BACK TO SITTING ON SIDE OF FLAT BED WITH BEDRAILS: A LITTLE
DRESSING REGULAR UPPER BODY CLOTHING: A LOT
DRESSING REGULAR UPPER BODY CLOTHING: A LOT
WALKING IN HOSPITAL ROOM: A LOT
STANDING UP FROM CHAIR USING ARMS: A LOT
MOBILITY SCORE: 13
PERSONAL GROOMING: A LITTLE
TOILETING: A LITTLE
HELP NEEDED FOR BATHING: A LOT
MOVING FROM LYING ON BACK TO SITTING ON SIDE OF FLAT BED WITH BEDRAILS: A LITTLE
DAILY ACTIVITIY SCORE: 15
WALKING IN HOSPITAL ROOM: A LOT
PERSONAL GROOMING: A LITTLE

## 2023-12-05 ASSESSMENT — ACTIVITIES OF DAILY LIVING (ADL): HOME_MANAGEMENT_TIME_ENTRY: 10

## 2023-12-05 NOTE — PROGRESS NOTES
Kaitlin Sarkar is a 89 y.o. female on day 7 of admission presenting with Atrial fibrillation with rapid ventricular response (CMS/HCC).    Subjective   Kaitlin Irwin is a 89 y.o. female presenting with the above.  The patient presents from home with her daughter who states that she has been crying and very weak.  She brought her to the hospital where she was found to be also very confused, and her daughter states that this is not her norm.  At present the patient is only oriented to person, and unable to contribute to this history.  The patient does have chronic atrial fibrillation and is on a blood thinner.  We are unsure what this blood thinner is at this point.  She also has type 2 diabetes mellitus which according to the ER physician is being diet controlled.  The patient does not appear to be in any distress at this time.  She presents for further evaluation.     11/27: Patient is weak but not hypoxic.  Patient and daughter had talked and since patient is just sitting in the ER not having much done they feel they can do just as well if not better by taking her home.  We will have her discharged home with some home health care at this time.  Turn to ER if symptoms worsen.     11/28: Kaitlin Sarkar is a 89 y.o. female with PMHx s/f HTN, chronic atrial fibrillation on Eliquis, T2DM, and admission for generalized weakness/COVID-19/confusion (11/26-11/27) at [place], presenting with worsening confusion and worsening generalized weakness.  Patient was discharged on 11/27/2023 following a conversation between the patient's family and the inpatient physician, at that time family was feeling that due to the patient boarding in the ER, she would be better cared for at home.  After discharge, the patient went back to her house and family was unable to get her out of the car due to her worsening weakness.  They also report that her mentation worsened during this time even since discharge from the ED, to where she is more  confused and not as alert.  Patient does not provide any information at this time due to her encephalopathy in setting of COVID-19 most likely also it is unclear if she has a history of dementia.  Upon my review interview with the patient she would not respond to any commands or answer any questions and nursing staff had noted that this is how she has been since arrival.  Nursing staff noted that she will awaken to voice but not following commands which is what my assessment had established.  I did attempt to call the patient's daughter but this went to voicemail.  I was unable to complete any information regarding the patient's histories, review of systems or history of present illness at this time.  While in the ED she was noted to be in atrial fibrillation with rapid ventricular response with the highest documented rate at 118 at which time she received a one-time dose of 5 mg IV Lopressor and then a one-time dose of 25 mg p.o. Lopressor.     ED Course (Summary):   Vitals on presentation: T98.8, /120 --> 112/77,  --> 90, RR 20, SpO2 95% RA   CBC with differential: WBC 10.8, Hb 15.9, platelets 127  Chemistry panel: Glucose 177, sodium 131, testing 4.0, BUN 20, serum creatinine 1.01, mag 1.71  High-sensitivity troponin 39-repeat pending.  ECG initially atrial fibrillation with RVR which improved to heart rate of 90 following IV metoprolol.  Imaging and results from 11/26/2023-11/27/2023 reviewed.  CXR was with a patchy right basilar airspace opacity with pneumonia not been excluded (ID consultation also reviewed, given negative procal and no fever decision was made to monitor off antibiotics).  CT head was consistent with chronic changes, nothing acute.  COVID was positive days ago now.     11/28: Addendum patient resting comfortably heart rate is relatively well controlled feel she would do fine on a medical floor with telemetry.  Cardiology recommends increasing metoprolol for rate control continue  apixaban.  At this time have restarted patient's Paxlovid.  Okay to go to med telemetry patient states she feels better already.  Work on PT and OT with supportive care.    11/29: Patient's heart rate this morning elevated prior to getting her Lopressor orally.  Patient moving better with therapy no shortness of breath just malaise.  Continue to work on getting her beta-blocker titrated back up.  Patient had missed several doses of her metoprolol  mg a day prior to coming in due to illness and being stuck in the ER.  Continue with conservative care finishing up Paxlovid PT OT discharge planning skilled nursing facility for rehab.  We will check labs in a.m. patient to be seen by my associate  tomorrow    11/30: Patient was seen and examined.  Blood pressure and heart rate are better controlled today.  Patient was being seen by swallow therapist when I saw and attempting thin liquids with bite sized food as ordered.  She feels stronger according to her today although still generally very weak overall.  Seen by PT OT who recommend moderate intensity SNF.  Currently awaiting preauthorization for placement in an extended care facility.    12/1: Patient was seen and examined.  She has no new complaints today.  Tolerated her meals today without nausea or vomiting.  Currently awaiting preauthorization for placement in an extended care facility.  12/2: Patient was seen and examined.  Continues to cough intermittently but however feels better.  She continues to tolerate orally.  Discussed extensively with daughter at bedside about the plan of care.  Currently awaiting preauthorization for placement in extended-care facility.    12/3: Patient was seen and examined.  Still having poor appetite overall although attempting to eat more clearly with daughter's encouragement at bedside.  Has no new complaints.  Currently awaiting authorization for placement.    12/4: Patient was seen and examined.  Has no new  complaints today.  Currently awaiting preauthorization for placement in extended-care facility.    12/5: Patient was seen and examined.  Was complaining of nausea and some abdominal discomfort.  Vague abdominal pain on palpation.  We will continue as needed Zofran.  Elevated abdominal ultrasound complete.  Discussed with daughter over the phone.  She is still on board with extended-care facility discharge.        Objective     Physical Exam  Constitutional:       Appearance: Normal appearance.   HENT:      Head: Normocephalic.      Right Ear: External ear normal.      Left Ear: External ear normal.      Nose: Rhinorrhea present.      Mouth/Throat:      Mouth: Mucous membranes are moist.   Eyes:      Extraocular Movements: Extraocular movements intact.      Conjunctiva/sclera: Conjunctivae normal.      Pupils: Pupils are equal, round, and reactive to light.   Cardiovascular:      Rate and Rhythm: Normal rate. Rhythm irregular.      Heart sounds: No murmur heard.  Pulmonary:      Effort: Pulmonary effort is normal.      Breath sounds: Normal breath sounds.   Abdominal:      General: Abdomen is flat.      Palpations: Abdomen is soft.   Musculoskeletal:      Cervical back: Normal range of motion.      Left lower leg: Edema present.      Comments: Significant kyphosis of the spine   Skin:     General: Skin is warm.   Neurological:      General: No focal deficit present.      Mental Status: She is alert.      Comments: General frailty and weakness.   Psychiatric:         Mood and Affect: Mood normal.     Patient is pleasant states she feels better    Last Recorded Vitals  Blood pressure 122/77, pulse 89, temperature 36.5 °C (97.7 °F), temperature source Temporal, resp. rate 16, height 1.524 m (5'), weight 55.8 kg (123 lb), SpO2 95 %.  Intake/Output last 3 Shifts:  I/O last 3 completed shifts:  In: 650 (11.7 mL/kg) [P.O.:650]  Out: - (0 mL/kg)   Weight: 55.8 kg     Relevant Results                  Scheduled  medications  apixaban, 2.5 mg, oral, BID  insulin lispro, 0-10 Units, subcutaneous, Before meals & nightly  losartan, 25 mg, oral, Daily  melatonin, 3 mg, oral, Daily  metoprolol succinate XL, 100 mg, oral, Daily  metoprolol tartrate, 50 mg, oral, BID  [Held by provider] nirmatrelvir-ritonavir, 2 tablet, oral, BID  pantoprazole, 40 mg, oral, Daily before breakfast   Or  pantoprazole, 40 mg, intravenous, Daily before breakfast  polyethylene glycol, 17 g, oral, Daily  thyroid (pork), 60 mg, oral, TID      Continuous medications     PRN medications  PRN medications: acetaminophen, bisacodyl, dextrose 10 % in water (D10W), dextrose, glucagon, guaiFENesin, metoprolol, ondansetron **OR** ondansetron             Assessment/Plan   Principal Problem:    Atrial fibrillation with rapid ventricular response (CMS/HCC)  Active Problems:    Pneumonia due to COVID-19 virus    Type 2 diabetes mellitus (CMS/HCC)    Generalized weakness    Acute metabolic encephalopathy           Continue on apixaban 2.5 mg twice daily  Metoprolol 50 mg twice a day  Metoprolol  mg daily on hold/home med  Losartan restarted.  Melatonin as needed  Low-dose sliding scale insulin  [place] Thyroid 60 mg daily  Paxlovid pack  Continue to monitor on medical floor with telemetry  PT and OT  Increase activity  Monitor oxygen requirements  Discharge planning  See orders for complete plan  Check labs in a.m.  See orders for complete plan      I spent 35 minutes in the professional and overall care of this patient.      Camille Ovalle MD

## 2023-12-05 NOTE — CARE PLAN
The patient's goals for the shift include      Problem: Diabetes  Goal: Maintain electrolyte levels within acceptable range throughout shift  12/5/2023 1048 by Rina Lainez RN  Outcome: Progressing  12/5/2023 1044 by Rina Lainez RN  Outcome: Progressing  12/5/2023 1044 by Rina Lainez RN  Outcome: Progressing  Goal: Maintain glucose levels >70mg/dl to <250mg/dl throughout shift  12/5/2023 1048 by Rina Lainez RN  Outcome: Progressing  12/5/2023 1044 by Rina Lainez RN  Outcome: Progressing  12/5/2023 1044 by Rina Lainez RN  Outcome: Progressing  Goal: No changes in neurological exam by end of shift  12/5/2023 1048 by Rina Lainez RN  Outcome: Progressing  12/5/2023 1044 by Rina Lainez RN  Outcome: Progressing  12/5/2023 1044 by Rina Lainez RN  Outcome: Progressing  Goal: Learn about and adhere to nutrition recommendations by end of shift  12/5/2023 1048 by Rina Lainez RN  Outcome: Progressing  12/5/2023 1044 by Rina Lainez RN  Outcome: Progressing  12/5/2023 1044 by Rina Lainez RN  Outcome: Progressing  Goal: Vital signs within normal range for age by end of shift  12/5/2023 1048 by Rina Lainez RN  Outcome: Progressing  12/5/2023 1044 by Rina Lainez RN  Outcome: Progressing  12/5/2023 1044 by Rina Lainez RN  Outcome: Progressing  Goal: Increase self care and/or family involovement by end of shift  12/5/2023 1048 by Rina Lainez RN  Outcome: Progressing  12/5/2023 1044 by Rina Lainez RN  Outcome: Progressing  12/5/2023 1044 by Rina Lainez RN  Outcome: Progressing  Goal: Receive DSME education by end of shift  12/5/2023 1048 by Rina Lainez RN  Outcome: Progressing  12/5/2023 1044 by Rina Lainez RN  Outcome: Progressing  12/5/2023 1044 by Rina Lainez RN  Outcome: Progressing     Problem: Skin  Goal: Participates in plan/prevention/treatment measures  Recent Flowsheet Documentation  Taken 12/5/2023 1048 by Rina Lainez RN  Participates in  plan/prevention/treatment measures: Increase activity/out of bed for meals  Taken 12/5/2023 1044 by Rina Lainez RN  Participates in plan/prevention/treatment measures: Increase activity/out of bed for meals  Goal: Prevent/minimize sheer/friction injuries  Recent Flowsheet Documentation  Taken 12/5/2023 1048 by Rina Lainez RN  Prevent/minimize sheer/friction injuries: Turn/reposition every 2 hours/use positioning/transfer devices  Taken 12/5/2023 1044 by Rina Lainez RN  Prevent/minimize sheer/friction injuries: Turn/reposition every 2 hours/use positioning/transfer devices  Goal: Promote/optimize nutrition  Recent Flowsheet Documentation  Taken 12/5/2023 1048 by Rina Lainez RN  Promote/optimize nutrition: Consume > 50% meals/supplements  Taken 12/5/2023 1044 by Rina Lainez RN  Promote/optimize nutrition: Consume > 50% meals/supplements     Problem: Skin  Goal: Participates in plan/prevention/treatment measures  12/5/2023 1048 by Rina Lainez RN  Outcome: Progressing  Flowsheets (Taken 12/5/2023 1048)  Participates in plan/prevention/treatment measures: Increase activity/out of bed for meals  12/5/2023 1044 by Rina Lainez RN  Outcome: Progressing  Flowsheets (Taken 12/5/2023 1044)  Participates in plan/prevention/treatment measures: Increase activity/out of bed for meals  12/5/2023 1044 by Rina Lainez RN  Outcome: Progressing  Flowsheets (Taken 12/5/2023 1044)  Participates in plan/prevention/treatment measures: Increase activity/out of bed for meals  Goal: Prevent/manage excess moisture  12/5/2023 1048 by Rina Lainez RN  Outcome: Progressing  12/5/2023 1044 by Rina Lainez RN  Outcome: Progressing  12/5/2023 1044 by Rina Lainez RN  Outcome: Progressing  Goal: Prevent/minimize sheer/friction injuries  12/5/2023 1048 by Rina Lainez RN  Outcome: Progressing  Flowsheets (Taken 12/5/2023 1048)  Prevent/minimize sheer/friction injuries: Turn/reposition every 2 hours/use  positioning/transfer devices  12/5/2023 1044 by Rina Lainez RN  Outcome: Progressing  Flowsheets (Taken 12/5/2023 1044)  Prevent/minimize sheer/friction injuries: Turn/reposition every 2 hours/use positioning/transfer devices  12/5/2023 1044 by Rina Lainez RN  Outcome: Progressing  Flowsheets (Taken 12/5/2023 1044)  Prevent/minimize sheer/friction injuries: Turn/reposition every 2 hours/use positioning/transfer devices  Goal: Promote/optimize nutrition  12/5/2023 1048 by Rina Lainez RN  Outcome: Progressing  Flowsheets (Taken 12/5/2023 1048)  Promote/optimize nutrition: Consume > 50% meals/supplements  12/5/2023 1044 by Rina Lainez RN  Outcome: Progressing  Flowsheets (Taken 12/5/2023 1044)  Promote/optimize nutrition: Consume > 50% meals/supplements  12/5/2023 1044 by Rina Lainez RN  Outcome: Progressing  Flowsheets (Taken 12/5/2023 1044)  Promote/optimize nutrition: Consume > 50% meals/supplements     Problem: Fall/Injury  Goal: Not fall by end of shift  12/5/2023 1048 by Rina Lainez RN  Outcome: Progressing  12/5/2023 1044 by Rina Lainez RN  Outcome: Progressing  12/5/2023 1044 by Rina Lainez RN  Outcome: Progressing  Goal: Be free from injury by end of the shift  12/5/2023 1048 by Rina Lainez RN  Outcome: Progressing  12/5/2023 1044 by Rina Lainez RN  Outcome: Progressing  12/5/2023 1044 by Rina Lainez RN  Outcome: Progressing  Goal: Verbalize understanding of personal risk factors for fall in the hospital  12/5/2023 1048 by Rina Lainez RN  Outcome: Progressing  12/5/2023 1044 by Rina Lainez RN  Outcome: Progressing  12/5/2023 1044 by Rina Lainez RN  Outcome: Progressing  Goal: Verbalize understanding of risk factor reduction measures to prevent injury from fall in the home  12/5/2023 1048 by Rina Lainez RN  Outcome: Progressing  12/5/2023 1044 by Rina Lainez RN  Outcome: Progressing  12/5/2023 1044 by Rina Lainez RN  Outcome: Progressing  Goal:  Use assistive devices by end of the shift  12/5/2023 1048 by Rina Lainez RN  Outcome: Progressing  12/5/2023 1044 by Rina Lainez RN  Outcome: Progressing  12/5/2023 1044 by Rina Lainez RN  Outcome: Progressing  Goal: Pace activities to prevent fatigue by end of the shift  12/5/2023 1048 by Rina Lainez RN  Outcome: Progressing  12/5/2023 1044 by Rina Lainez RN  Outcome: Progressing  12/5/2023 1044 by Rina Lainez RN  Outcome: Progressing     Problem: Respiratory  Goal: Clear secretions with interventions this shift  12/5/2023 1048 by Rina Lainez RN  Outcome: Progressing  12/5/2023 1044 by Rina Lainez RN  Outcome: Progressing  12/5/2023 1044 by Rina Lainez RN  Outcome: Progressing  Goal: Minimize anxiety/maximize coping throughout shift  12/5/2023 1048 by Rina Lainez RN  Outcome: Progressing  12/5/2023 1044 by Rina Lainez RN  Outcome: Progressing  12/5/2023 1044 by Rina Lainez RN  Outcome: Progressing  Goal: Minimal/no exertional discomfort or dyspnea this shift  12/5/2023 1048 by Rina Lainez RN  Outcome: Progressing  12/5/2023 1044 by Rina Lainez RN  Outcome: Progressing  12/5/2023 1044 by Rina Lainez RN  Outcome: Progressing  Goal: No signs of respiratory distress (eg. Use of accessory muscles. Peds grunting)  12/5/2023 1048 by Rina Lainez RN  Outcome: Progressing  12/5/2023 1044 by Rina Lainez RN  Outcome: Progressing  12/5/2023 1044 by Rina Lainez RN  Outcome: Progressing  Goal: Patent airway maintained this shift  12/5/2023 1048 by Rina Lainez RN  Outcome: Progressing  12/5/2023 1044 by Rina Lainez RN  Outcome: Progressing  12/5/2023 1044 by Rina Lainez RN  Outcome: Progressing  Goal: Tolerate pulmonary toileting this shift  12/5/2023 1048 by Rina Lainez RN  Outcome: Progressing  12/5/2023 1044 by Rina Lainez RN  Outcome: Progressing  12/5/2023 1044 by Rina Lainez RN  Outcome: Progressing  Goal: Verbalize decreased  shortness of breath this shift  12/5/2023 1048 by Rina Lainez RN  Outcome: Progressing  12/5/2023 1044 by Rina Lainez RN  Outcome: Progressing  12/5/2023 1044 by Rina Lainez RN  Outcome: Progressing  Goal: Increase self care and/or family involvement in next 24 hours  12/5/2023 1048 by Rina Lainez RN  Outcome: Progressing  12/5/2023 1044 by Rina Lainez RN  Outcome: Progressing  12/5/2023 1044 by Rina Lainez RN  Outcome: Progressing       The clinical goals for the shift include remain free from falls and injury this shift    See assessment and mar. Remains on room air at this time. Currently up to chair.

## 2023-12-05 NOTE — PROGRESS NOTES
Kaitlin Sarkar is a 89 y.o. female on day 6 of admission presenting with Atrial fibrillation with rapid ventricular response (CMS/HCC).    Subjective   Kaitlin Irwin is a 89 y.o. female presenting with the above.  The patient presents from home with her daughter who states that she has been crying and very weak.  She brought her to the hospital where she was found to be also very confused, and her daughter states that this is not her norm.  At present the patient is only oriented to person, and unable to contribute to this history.  The patient does have chronic atrial fibrillation and is on a blood thinner.  We are unsure what this blood thinner is at this point.  She also has type 2 diabetes mellitus which according to the ER physician is being diet controlled.  The patient does not appear to be in any distress at this time.  She presents for further evaluation.     11/27: Patient is weak but not hypoxic.  Patient and daughter had talked and since patient is just sitting in the ER not having much done they feel they can do just as well if not better by taking her home.  We will have her discharged home with some home health care at this time.  Turn to ER if symptoms worsen.     11/28: Kaitlin Sarkar is a 89 y.o. female with PMHx s/f HTN, chronic atrial fibrillation on Eliquis, T2DM, and admission for generalized weakness/COVID-19/confusion (11/26-11/27) at [facility], presenting with worsening confusion and worsening generalized weakness.  Patient was discharged on 11/27/2023 following a conversation between the patient's family and the inpatient physician, at that time family was feeling that due to the patient boarding in the ER, she would be better cared for at home.  After discharge, the patient went back to her house and family was unable to get her out of the car due to her worsening weakness.  They also report that her mentation worsened during this time even since discharge from the ED, to where she is more  confused and not as alert.  Patient does not provide any information at this time due to her encephalopathy in setting of COVID-19 most likely also it is unclear if she has a history of dementia.  Upon my review interview with the patient she would not respond to any commands or answer any questions and nursing staff had noted that this is how she has been since arrival.  Nursing staff noted that she will awaken to voice but not following commands which is what my assessment had established.  I did attempt to call the patient's daughter but this went to voicemail.  I was unable to complete any information regarding the patient's histories, review of systems or history of present illness at this time.  While in the ED she was noted to be in atrial fibrillation with rapid ventricular response with the highest documented rate at 118 at which time she received a one-time dose of 5 mg IV Lopressor and then a one-time dose of 25 mg p.o. Lopressor.     ED Course (Summary):   Vitals on presentation: T98.8, /120 --> 112/77,  --> 90, RR 20, SpO2 95% RA   CBC with differential: WBC 10.8, Hb 15.9, platelets 127  Chemistry panel: Glucose 177, sodium 131, testing 4.0, BUN 20, serum creatinine 1.01, mag 1.71  High-sensitivity troponin 39-repeat pending.  ECG initially atrial fibrillation with RVR which improved to heart rate of 90 following IV metoprolol.  Imaging and results from 11/26/2023-11/27/2023 reviewed.  CXR was with a patchy right basilar airspace opacity with pneumonia not been excluded (ID consultation also reviewed, given negative procal and no fever decision was made to monitor off antibiotics).  CT head was consistent with chronic changes, nothing acute.  COVID was positive days ago now.     11/28: Addendum patient resting comfortably heart rate is relatively well controlled feel she would do fine on a medical floor with telemetry.  Cardiology recommends increasing metoprolol for rate control continue  apixaban.  At this time have restarted patient's Paxlovid.  Okay to go to med telemetry patient states she feels better already.  Work on PT and OT with supportive care.    11/29: Patient's heart rate this morning elevated prior to getting her Lopressor orally.  Patient moving better with therapy no shortness of breath just malaise.  Continue to work on getting her beta-blocker titrated back up.  Patient had missed several doses of her metoprolol  mg a day prior to coming in due to illness and being stuck in the ER.  Continue with conservative care finishing up Paxlovid PT OT discharge planning skilled nursing facility for rehab.  We will check labs in a.m. patient to be seen by my associate  tomorrow    11/30: Patient was seen and examined.  Blood pressure and heart rate are better controlled today.  Patient was being seen by swallow therapist when I saw and attempting thin liquids with bite sized food as ordered.  She feels stronger according to her today although still generally very weak overall.  Seen by PT OT who recommend moderate intensity SNF.  Currently awaiting preauthorization for placement in an extended care facility.    12/1: Patient was seen and examined.  She has no new complaints today.  Tolerated her meals today without nausea or vomiting.  Currently awaiting preauthorization for placement in an extended care facility.  12/2: Patient was seen and examined.  Continues to cough intermittently but however feels better.  She continues to tolerate orally.  Discussed extensively with daughter at bedside about the plan of care.  Currently awaiting preauthorization for placement in extended-care facility.    12/3: Patient was seen and examined.  Still having poor appetite overall although attempting to eat more clearly with daughter's encouragement at bedside.  Has no new complaints.  Currently awaiting authorization for placement.    12/4: Patient was seen and examined.  Has no new  complaints today.  Currently awaiting preauthorization for placement in extended-care facility.      Objective     Physical Exam  Constitutional:       Appearance: Normal appearance.   HENT:      Head: Normocephalic.      Right Ear: External ear normal.      Left Ear: External ear normal.      Nose: Rhinorrhea present.      Mouth/Throat:      Mouth: Mucous membranes are moist.   Eyes:      Extraocular Movements: Extraocular movements intact.      Conjunctiva/sclera: Conjunctivae normal.      Pupils: Pupils are equal, round, and reactive to light.   Cardiovascular:      Rate and Rhythm: Normal rate. Rhythm irregular.      Heart sounds: No murmur heard.  Pulmonary:      Effort: Pulmonary effort is normal.      Breath sounds: Normal breath sounds.   Abdominal:      General: Abdomen is flat.      Palpations: Abdomen is soft.   Musculoskeletal:      Cervical back: Normal range of motion.      Left lower leg: Edema present.      Comments: Significant kyphosis of the spine   Skin:     General: Skin is warm.   Neurological:      General: No focal deficit present.      Mental Status: She is alert.      Comments: General frailty and weakness.   Psychiatric:         Mood and Affect: Mood normal.     Patient is pleasant states she feels better    Last Recorded Vitals  Blood pressure (!) 145/92, pulse 62, temperature 36.9 °C (98.4 °F), temperature source Temporal, resp. rate 16, height 1.524 m (5'), weight 55.8 kg (123 lb), SpO2 93 %.  Intake/Output last 3 Shifts:  I/O last 3 completed shifts:  In: 950 (17 mL/kg) [P.O.:950]  Out: - (0 mL/kg)   Weight: 55.8 kg     Relevant Results                  Scheduled medications  apixaban, 2.5 mg, oral, BID  insulin lispro, 0-10 Units, subcutaneous, Before meals & nightly  losartan, 25 mg, oral, Daily  melatonin, 3 mg, oral, Daily  metoprolol succinate XL, 100 mg, oral, Daily  metoprolol tartrate, 50 mg, oral, BID  [Held by provider] nirmatrelvir-ritonavir, 2 tablet, oral,  BID  pantoprazole, 40 mg, oral, Daily before breakfast   Or  pantoprazole, 40 mg, intravenous, Daily before breakfast  polyethylene glycol, 17 g, oral, Daily  thyroid (pork), 60 mg, oral, TID      Continuous medications     PRN medications  PRN medications: acetaminophen, bisacodyl, dextrose 10 % in water (D10W), dextrose, glucagon, guaiFENesin, metoprolol, ondansetron **OR** ondansetron             Assessment/Plan   Principal Problem:    Atrial fibrillation with rapid ventricular response (CMS/HCC)  Active Problems:    Pneumonia due to COVID-19 virus    Type 2 diabetes mellitus (CMS/HCC)    Generalized weakness    Acute metabolic encephalopathy           Continue on apixaban 2.5 mg twice daily  Metoprolol 50 mg twice a day  Metoprolol  mg daily on hold/home med  Losartan restarted.  Melatonin as needed  Low-dose sliding scale insulin  [place] Thyroid 60 mg daily  Paxlovid pack  Continue to monitor on medical floor with telemetry  PT and OT  Increase activity  Monitor oxygen requirements  Discharge planning  See orders for complete plan  Check labs in a.m.  See orders for complete plan      I spent 35 minutes in the professional and overall care of this patient.      Camille Ovalle MD

## 2023-12-05 NOTE — PROGRESS NOTES
Occupational Therapy    OT Treatment    Patient Name: Kaitlin Sarkar  MRN: [id number]  Today's Date: 12/5/2023  Time Calculation  Start Time: 1201 (Simultaneous filing. User may not have seen previous data.)  Stop Time: 1211 (Simultaneous filing. User may not have seen previous data.)  Time Calculation (min): 10 min (Simultaneous filing. User may not have seen previous data.)         Assessment:  End of Session Patient Position: Alarm on, Up in chair (with daughter setting up for lunch)       Plan:  Treatment Interventions: ADL retraining, Functional transfer training, UE strengthening/ROM, Endurance training    Subjective     Current Problem:  Patient Active Problem List   Diagnosis    Pneumonia due to COVID-19 virus    Chronic atrial fibrillation (CMS/HCC)    Type 2 diabetes mellitus (CMS/HCC)    Altered mental status    Generalized weakness    Atrial fibrillation with rapid ventricular response (CMS/HCC)    Acute metabolic encephalopathy       General:       Prior to Session Communication: PCT/NA/CTA  Patient Position Received:  (BSC over toilet)  General Comment: pt. on BSC over toilet on arrival. She is MIN A 1. cues for sequencing walker and toileting task MOD Verbal and tactile cueing. Pt. standing at sink level 2 mins washing hands cueing for sequencing start to completion. Pt. ambulation with FWW to chair. Daughter present and assisting with care. Lunch arrived dtr. set up.    Vital Signs:       Pain:  Pain Assessment  Pain Assessment: 0-10  Pain Score: 0 - No pain (only nauseated)  Objective      Activities of Daily Living:    Grooming  Grooming Level of Assistance: Minimum assistance, Moderate verbal cues, Tactile cues  Grooming Where Assessed: Standing sinkside  Grooming Comments: washing hands           Toileting  Toileting Level of Assistance: Minimum assistance, Tactile cues, Minimal verbal cues  Where Assessed: Bedside commode, Toilet  Toileting Comments: pt. wiping    Functional Standing  Tolerance:  Time: 3 mins  Activity: good balance    Bed Mobility/Transfers:    Transfer 1  Technique 1: Sit to stand, Stand to sit  Transfer Device 1: Walker  Transfer Level of Assistance 1: Minimum assistance  Toilet Transfers  Toilet Transfer From: Other (Comment) (BSC over toilet)  Toilet Transfer Type: To and from  Toilet Transfer to: Standard bedside commode  Toilet Transfer Technique: Ambulating (FWW)  Toilet Transfers: Minimal assistance, Verbal cues  Toilet Transfers Comments: to chair. (cued for hand placement)         Outcome Measures:[facility] Daily Activity  Putting on and taking off regular lower body clothing: A lot  Bathing (including washing, rinsing, drying): A lot  Putting on and taking off regular upper body clothing: A lot  Toileting, which includes using toilet, bedpan or urinal: A little  Taking care of personal grooming such as brushing teeth: A little  Eating Meals: A little  Daily Activity - Total Score: 15  Education Documentation  Body Mechanics, taught by DE Woods at 12/4/2023  2:18 PM.  Learner: Family, Patient  Readiness: Acceptance  Method: Explanation, Teach-back  Response: Verbalizes Understanding, Needs Reinforcement    Precautions, taught by DE Woods at 12/4/2023  2:18 PM.  Learner: Family, Patient  Readiness: Acceptance  Method: Explanation, Teach-back  Response: Verbalizes Understanding, Needs Reinforcement    ADL Training, taught by DE Woods at 12/4/2023  2:18 PM.  Learner: Family, Patient  Readiness: Acceptance  Method: Explanation, Teach-back  Response: Verbalizes Understanding, Needs Reinforcement    Education Comments  No comments found.        EDUCATION:  Education  Education Comment: Deferred daughter to case mananagement re: d/c plans    Goals:  Encounter Problems       Encounter Problems (Active)       ADLs       Patient with complete lower body dressing with modified independent level of assistance donning and doffing all LE clothes   with PRN adaptive equipment while supported sitting and standing (Progressing)       Start:  11/28/23    Expected End:  12/08/23            Patient will complete daily grooming tasks brushing teeth and washing face/hair with modified independent level of assistance and PRN adaptive equipment while supported sitting and standing. (Progressing)       Start:  11/28/23    Expected End:  12/08/23               Mobility       STG - Patient will ambulate (Progressing)       Start:  11/28/23    Expected End:  12/08/23       FWW + FT         STRENGTHENING (Progressing)       Start:  11/28/23    Expected End:  12/08/23       20+ REPS RROM INCREASING STRENGTH TO STABILIZE OOB ACTIVITIES            TRANSFERS       Patient will complete functional transfers and functional mobility with least restrictive device with supervision level of assistance. (Progressing)       Start:  11/28/23    Expected End:  12/08/23

## 2023-12-05 NOTE — PROGRESS NOTES
Physical Therapy    Physical Therapy Treatment    Patient Name: Kaitlin Sarkar  MRN: [id number]  Today's Date: 12/5/2023  Time Calculation  Start Time: 1149  Stop Time: 1215  Time Calculation (min): 26 min       Assessment/Plan   PT Assessment  PT Assessment Results: Decreased strength, Decreased endurance, Impaired judgement  Rehab Prognosis: Good  End of Session Communication: Bedside nurse, PCT/NA/CTA  Assessment Comment: pt up in chiar upon arrival. pt amb in room with NBOS and shufling with turning. cues to talke larger steps and  bilat LE. pt took a seated rest break EOB. pt then amb again in room and took a rest break in chair. daughter came in during middle of treatment. pt then amb again and used restroom. cue son FWW navigation in restroom and cuesfor safety. pt required some cues on processing and what to do for hygiene care. dispensed FWW.  End of Session Patient Position: Alarm on, Up in chair  PT Plan  Inpatient/Swing Bed or Outpatient: Inpatient  PT Plan  Treatment/Interventions: Bed mobility, Transfer training, Gait training, Strengthening, Therapeutic exercise  PT Plan: Skilled PT  PT Frequency: 4 times per week  PT Discharge Recommendations: Moderate intensity level of continued care  Equipment Recommended upon Discharge: Wheeled walker (TBD)  PT Recommended Transfer Status: Assist x2 (FWW)  PT - OK to Discharge: Yes (TO NEXT LOC WHEN MEDICALLY CLEARED)      General Visit Information:   PT  Visit  PT Received On: 12/05/23       Subjective   Precautions:     Vital Signs:       Objective   Pain:  Pain Assessment  Pain Score: 3  Black-Baker FACES Pain Rating:  (back)  Cognition:  Cognition  Orientation Level: Disoriented to situation, Disoriented to time       Treatments:            Ambulation/Gait Training 1  Surface 1: Level tile  Device 1: Rolling walker  Assistance 1: Minimum assistance  Quality of Gait 1: Narrow base of support, Shuffling gait, Diminished heel strike, Decreased step  length  Comments/Distance (ft) 1: 25, 25, 30  Transfer 1  Technique 1: Sit to stand, Stand to sit  Transfer Device 1: Walker  Transfer Level of Assistance 1: Minimum assistance  Trials/Comments 1: 1x EOB, 2x chair, 1x toilet    Outcome Measures:  [facility] Basic Mobility  Turning from your back to your side while in a flat bed without using bedrails: A little  Moving from lying on your back to sitting on the side of a flat bed without using bedrails: A little  Moving to and from bed to chair (including a wheelchair): A lot  Standing up from a chair using your arms (e.g. wheelchair or bedside chair): A lot  To walk in hospital room: A lot  Climbing 3-5 steps with railing: Total  Basic Mobility - Total Score: 13    Education Documentation  Mobility Training, taught by Marlys Sams PTA at 12/5/2023 12:29 PM.  Learner: Patient  Readiness: Acceptance  Method: Explanation  Response: Demonstrated Understanding    Education Comments  No comments found.        OP EDUCATION:       Encounter Problems       Encounter Problems (Active)       Mobility       STG - Patient will ambulate (Progressing)       Start:  11/28/23    Expected End:  12/08/23       FWW + FT         STRENGTHENING (Progressing)       Start:  11/28/23    Expected End:  12/08/23       20+ REPS RROM INCREASING STRENGTH TO STABILIZE OOB ACTIVITIES            Pain - Adult          Transfers       STG - Patient to transfer to and from sit to supine (Progressing)       Start:  11/28/23    Expected End:  12/08/23       MIN A NO RAILS         STG - Patient will transfer sit to and from stand (Progressing)       Start:  11/28/23    Expected End:  12/08/23       FWW CGA X1 A USIGN PROPER TECHNIQUE

## 2023-12-06 VITALS
OXYGEN SATURATION: 95 % | TEMPERATURE: 96.8 F | SYSTOLIC BLOOD PRESSURE: 123 MMHG | RESPIRATION RATE: 16 BRPM | BODY MASS INDEX: 24.15 KG/M2 | WEIGHT: 123 LBS | HEART RATE: 83 BPM | DIASTOLIC BLOOD PRESSURE: 81 MMHG | HEIGHT: 60 IN

## 2023-12-06 PROBLEM — U07.1 PNEUMONIA DUE TO COVID-19 VIRUS: Status: RESOLVED | Noted: 2023-11-27 | Resolved: 2023-12-06

## 2023-12-06 PROBLEM — G93.41 ACUTE METABOLIC ENCEPHALOPATHY: Status: RESOLVED | Noted: 2023-11-28 | Resolved: 2023-12-06

## 2023-12-06 PROBLEM — J12.82 PNEUMONIA DUE TO COVID-19 VIRUS: Status: RESOLVED | Noted: 2023-11-27 | Resolved: 2023-12-06

## 2023-12-06 LAB
GLUCOSE BLD MANUAL STRIP-MCNC: 125 MG/DL (ref 74–99)
GLUCOSE BLD MANUAL STRIP-MCNC: 91 MG/DL (ref 74–99)

## 2023-12-06 PROCEDURE — 2500000001 HC RX 250 WO HCPCS SELF ADMINISTERED DRUGS (ALT 637 FOR MEDICARE OP): Performed by: INTERNAL MEDICINE

## 2023-12-06 PROCEDURE — 99239 HOSP IP/OBS DSCHRG MGMT >30: CPT | Performed by: INTERNAL MEDICINE

## 2023-12-06 PROCEDURE — 2500000004 HC RX 250 GENERAL PHARMACY W/ HCPCS (ALT 636 FOR OP/ED): Performed by: STUDENT IN AN ORGANIZED HEALTH CARE EDUCATION/TRAINING PROGRAM

## 2023-12-06 PROCEDURE — 2500000004 HC RX 250 GENERAL PHARMACY W/ HCPCS (ALT 636 FOR OP/ED): Performed by: INTERNAL MEDICINE

## 2023-12-06 PROCEDURE — 82947 ASSAY GLUCOSE BLOOD QUANT: CPT

## 2023-12-06 RX ORDER — ALUMINUM HYDROXIDE, MAGNESIUM HYDROXIDE, AND SIMETHICONE 1200; 120; 1200 MG/30ML; MG/30ML; MG/30ML
20 SUSPENSION ORAL 4 TIMES DAILY PRN
Qty: 355 ML | Refills: 0 | Status: SHIPPED | OUTPATIENT
Start: 2023-12-06

## 2023-12-06 RX ORDER — GUAIFENESIN 600 MG/1
600 TABLET, EXTENDED RELEASE ORAL EVERY 12 HOURS PRN
Qty: 10 TABLET | Refills: 0 | Status: SHIPPED | OUTPATIENT
Start: 2023-12-06 | End: 2023-12-11

## 2023-12-06 RX ORDER — ONDANSETRON 4 MG/1
4 TABLET, FILM COATED ORAL EVERY 8 HOURS PRN
Qty: 20 TABLET | Refills: 0 | Status: SHIPPED | OUTPATIENT
Start: 2023-12-06

## 2023-12-06 RX ADMIN — APIXABAN 2.5 MG: 5 TABLET, FILM COATED ORAL at 09:10

## 2023-12-06 RX ADMIN — PANTOPRAZOLE SODIUM 40 MG: 40 TABLET, DELAYED RELEASE ORAL at 09:10

## 2023-12-06 RX ADMIN — LEVOTHYROXINE, LIOTHYRONINE 60 MG: 38; 9 TABLET ORAL at 09:10

## 2023-12-06 RX ADMIN — LOSARTAN POTASSIUM 25 MG: 25 TABLET, FILM COATED ORAL at 09:09

## 2023-12-06 RX ADMIN — METOPROLOL SUCCINATE 100 MG: 50 TABLET, EXTENDED RELEASE ORAL at 09:10

## 2023-12-06 RX ADMIN — ACETAMINOPHEN 650 MG: 325 TABLET ORAL at 11:28

## 2023-12-06 RX ADMIN — PANTOPRAZOLE SODIUM 40 MG: 40 TABLET, DELAYED RELEASE ORAL at 06:14

## 2023-12-06 RX ADMIN — METOPROLOL TARTRATE 50 MG: 50 TABLET, FILM COATED ORAL at 09:09

## 2023-12-06 ASSESSMENT — COGNITIVE AND FUNCTIONAL STATUS - GENERAL
EATING MEALS: A LITTLE
MOBILITY SCORE: 13
DAILY ACTIVITIY SCORE: 15
TURNING FROM BACK TO SIDE WHILE IN FLAT BAD: A LITTLE
HELP NEEDED FOR BATHING: A LOT
WALKING IN HOSPITAL ROOM: A LOT
WALKING IN HOSPITAL ROOM: A LOT
TOILETING: A LITTLE
MOBILITY SCORE: 13
EATING MEALS: A LITTLE
MOVING TO AND FROM BED TO CHAIR: A LOT
TURNING FROM BACK TO SIDE WHILE IN FLAT BAD: A LITTLE
STANDING UP FROM CHAIR USING ARMS: A LOT
STANDING UP FROM CHAIR USING ARMS: A LOT
DAILY ACTIVITIY SCORE: 15
CLIMB 3 TO 5 STEPS WITH RAILING: TOTAL
DRESSING REGULAR LOWER BODY CLOTHING: A LOT
HELP NEEDED FOR BATHING: A LOT
MOVING TO AND FROM BED TO CHAIR: A LOT
PERSONAL GROOMING: A LITTLE
MOVING FROM LYING ON BACK TO SITTING ON SIDE OF FLAT BED WITH BEDRAILS: A LITTLE
PERSONAL GROOMING: A LITTLE
DRESSING REGULAR UPPER BODY CLOTHING: A LOT
MOVING FROM LYING ON BACK TO SITTING ON SIDE OF FLAT BED WITH BEDRAILS: A LITTLE
CLIMB 3 TO 5 STEPS WITH RAILING: TOTAL
DRESSING REGULAR LOWER BODY CLOTHING: A LOT
TOILETING: A LITTLE
DRESSING REGULAR UPPER BODY CLOTHING: A LOT

## 2023-12-06 ASSESSMENT — PAIN SCALES - WONG BAKER: WONGBAKER_NUMERICALRESPONSE: NO HURT

## 2023-12-06 ASSESSMENT — PAIN SCALES - GENERAL
PAINLEVEL_OUTOF10: 2
PAINLEVEL_OUTOF10: 0 - NO PAIN

## 2023-12-06 ASSESSMENT — PAIN - FUNCTIONAL ASSESSMENT
PAIN_FUNCTIONAL_ASSESSMENT: 0-10
PAIN_FUNCTIONAL_ASSESSMENT: 0-10

## 2023-12-06 NOTE — NURSING NOTE
Patient was discharged to [facility] in [place]. Daughter was at bedside when transport arrived. Patient's daughter took all of the patient's belongings with her. The IV was removed prior to the patient leaving.

## 2023-12-06 NOTE — CARE PLAN
Problem: ADLs  Goal: Patient with complete lower body dressing with modified independent level of assistance donning and doffing all LE clothes  with PRN adaptive equipment while supported sitting and standing  Outcome: Adequate for Discharge  Goal: Patient will complete daily grooming tasks brushing teeth and washing face/hair with modified independent level of assistance and PRN adaptive equipment while supported sitting and standing.  Outcome: Adequate for Discharge     Problem: TRANSFERS  Goal: Patient will complete functional transfers and functional mobility with least restrictive device with supervision level of assistance.  Outcome: Adequate for Discharge     Problem: Mobility  Goal: STG - Patient will ambulate  Outcome: Adequate for Discharge  Goal: STRENGTHENING  Outcome: Adequate for Discharge     Problem: Fall/Injury  Goal: Not fall by end of shift  12/6/2023 1428 by Allison Ellsworth RN  Outcome: Adequate for Discharge  12/6/2023 1427 by Allison Ellsworth RN  Outcome: Progressing  Goal: Be free from injury by end of the shift  12/6/2023 1428 by Allison Ellsworth RN  Outcome: Adequate for Discharge  12/6/2023 1427 by Allison Ellsworth RN  Outcome: Progressing  Goal: Verbalize understanding of personal risk factors for fall in the hospital  12/6/2023 1428 by Allison Ellsworth RN  Outcome: Adequate for Discharge  12/6/2023 1427 by Allison Ellsworth RN  Outcome: Progressing  Goal: Verbalize understanding of risk factor reduction measures to prevent injury from fall in the home  12/6/2023 1428 by Allison Ellsworth RN  Outcome: Adequate for Discharge  12/6/2023 1427 by Allison Ellsworth RN  Outcome: Progressing  Goal: Use assistive devices by end of the shift  12/6/2023 1428 by Allison Ellsworth RN  Outcome: Adequate for Discharge  12/6/2023 1427 by Allison Ellsworth RN  Outcome: Progressing  Goal: Pace activities to prevent fatigue by end of the shift  12/6/2023 1428 by Allison Ellsworth RN  Outcome:  Adequate for Discharge  12/6/2023 1427 by Allison Ellsworth RN  Outcome: Progressing       No falls or injury this shift. Patient remained safe and comfortable. [facility] [place].

## 2023-12-06 NOTE — DISCHARGE SUMMARY
Discharge Diagnosis  Atrial fibrillation with rapid ventricular response (CMS/HCC)  COVID-19 infection with?  Pneumonia  Type 2 diabetes  Acute metabolic encephalopathy    Issues Requiring Follow-Up      Discharge Meds     Your medication list        START taking these medications        Instructions Last Dose Given Next Dose Due   alum-mag hydroxide-simeth 200-200-20 mg/5 mL oral suspension  Commonly known as: Mylanta      Take 20 mL by mouth 4 times a day as needed for indigestion or heartburn.       guaiFENesin 600 mg 12 hr tablet  Commonly known as: Mucinex      Take 1 tablet (600 mg) by mouth every 12 hours if needed for congestion for up to 5 days. Do not crush, chew, or split.       ondansetron 4 mg tablet  Commonly known as: Zofran      Take 1 tablet (4 mg) by mouth every 8 hours if needed for nausea or vomiting.              CONTINUE taking these medications        Instructions Last Dose Given Next Dose Due   apixaban 2.5 mg tablet  Commonly known as: Eliquis           [place] Thyroid 60 mg tablet  Generic drug: thyroid (pork)           losartan 25 mg tablet  Commonly known as: Cozaar           metoprolol succinate  mg 24 hr tablet  Commonly known as: Toprol-XL                  STOP taking these medications      Paxlovid 300 mg (150 mg x 2)-100 mg tablet therapy pack  Generic drug: nirmatrelvir-ritonavir                  Where to Get Your Medications        These medications were sent to [facility]  [address] [place] [address]      Hours: 8AM to 6PM Mon-Fri, 8AM to 2PM Sat, 8AM to 12PM Sun Phone: [phone]   alum-mag hydroxide-simeth 200-200-20 mg/5 mL oral suspension  guaiFENesin 600 mg 12 hr tablet  ondansetron 4 mg tablet         Test Results Pending At Discharge  Pending Labs       No current pending labs.            Hospital Course    Kaitlin Irwin is a 89 y.o. female presenting with the above.  The patient presents from home with her daughter who states that she has been  crying and very weak.  She brought her to the hospital where she was found to be also very confused, and her daughter states that this is not her norm.  At present the patient is only oriented to person, and unable to contribute to this history.  The patient does have chronic atrial fibrillation and is on a blood thinner.  We are unsure what this blood thinner is at this point.  She also has type 2 diabetes mellitus which according to the ER physician is being diet controlled.  The patient does not appear to be in any distress at this time.  She presents for further evaluation.     11/27: Patient is weak but not hypoxic.  Patient and daughter had talked and since patient is just sitting in the ER not having much done they feel they can do just as well if not better by taking her home.  We will have her discharged home with some home health care at this time.  Turn to ER if symptoms worsen.        11/28: Kaitlin Sarkar is a 89 y.o. female with PMHx s/f HTN, chronic atrial fibrillation on Eliquis, T2DM, and admission for generalized weakness/COVID-19/confusion (11/26-11/27) at [facility], presenting with worsening confusion and worsening generalized weakness.  Patient was discharged on 11/27/2023 following a conversation between the patient's family and the inpatient physician, at that time family was feeling that due to the patient boarding in the ER, she would be better cared for at home.  After discharge, the patient went back to her house and family was unable to get her out of the car due to her worsening weakness.  They also report that her mentation worsened during this time even since discharge from the ED, to where she is more confused and not as alert.  Patient does not provide any information at this time due to her encephalopathy in setting of COVID-19 most likely also it is unclear if she has a history of dementia.  Upon my review interview with the patient she would not respond to any commands or answer any  questions and nursing staff had noted that this is how she has been since arrival.  Nursing staff noted that she will awaken to voice but not following commands which is what my assessment had established.  I did attempt to call the patient's daughter but this went to voicemail.  I was unable to complete any information regarding the patient's histories, review of systems or history of present illness at this time.  While in the ED she was noted to be in atrial fibrillation with rapid ventricular response with the highest documented rate at 118 at which time she received a one-time dose of 5 mg IV Lopressor and then a one-time dose of 25 mg p.o. Lopressor.     ED Course (Summary):   Vitals on presentation: T98.8, /120 --> 112/77,  --> 90, RR 20, SpO2 95% RA   CBC with differential: WBC 10.8, Hb 15.9, platelets 127  Chemistry panel: Glucose 177, sodium 131, testing 4.0, BUN 20, serum creatinine 1.01, mag 1.71  High-sensitivity troponin 39-repeat pending.  ECG initially atrial fibrillation with RVR which improved to heart rate of 90 following IV metoprolol.  Imaging and results from 11/26/2023-11/27/2023 reviewed.  CXR was with a patchy right basilar airspace opacity with pneumonia not been excluded (ID consultation also reviewed, given negative procal and no fever decision was made to monitor off antibiotics).  CT head was consistent with chronic changes, nothing acute.  COVID was positive days ago now.     11/28: Addendum patient resting comfortably heart rate is relatively well controlled feel she would do fine on a medical floor with telemetry.  Cardiology recommends increasing metoprolol for rate control continue apixaban.  At this time have restarted patient's Paxlovid.  Okay to go to med telemetry patient states she feels better already.  Work on PT and OT with supportive care.     11/29: Patient's heart rate this morning elevated prior to getting her Lopressor orally.  Patient moving better with  therapy no shortness of breath just malaise.  Continue to work on getting her beta-blocker titrated back up.  Patient had missed several doses of her metoprolol  mg a day prior to coming in due to illness and being stuck in the ER.  Continue with conservative care finishing up Paxlovid PT OT discharge planning skilled nursing facility for rehab.  We will check labs in a.m. patient to be seen by my associate  tomorrow     11/30: Patient was seen and examined.  Blood pressure and heart rate are better controlled today.  Patient was being seen by swallow therapist when I saw and attempting thin liquids with bite sized food as ordered.  She feels stronger according to her today although still generally very weak overall.  Seen by PT OT who recommend moderate intensity SNF.  Currently awaiting preauthorization for placement in an extended care facility.     12/1: Patient was seen and examined.  She has no new complaints today.  Tolerated her meals today without nausea or vomiting.  Currently awaiting preauthorization for placement in an extended care facility.  12/2: Patient was seen and examined.  Continues to cough intermittently but however feels better.  She continues to tolerate orally.  Discussed extensively with daughter at bedside about the plan of care.  Currently awaiting preauthorization for placement in extended-care facility.     12/3: Patient was seen and examined.  Still having poor appetite overall although attempting to eat more clearly with daughter's encouragement at bedside.  Has no new complaints.  Currently awaiting authorization for placement.     12/4: Patient was seen and examined.  Has no new complaints today.  Currently awaiting preauthorization for placement in extended-care facility.     12/5: Patient was seen and examined.  Was complaining of nausea and some abdominal discomfort.  Vague abdominal pain on palpation.  We will continue as needed Zofran.  Elevated abdominal  ultrasound complete.  Discussed with daughter over the phone.  She is still on board with extended-care facility discharge.    12/6: Patient was seen and examined.  Patient and family elected to not proceed with ultrasound of the abdomen.  Discussed with the patient's daughter over the phone about that.  She verbalizes understanding.  We will continue as needed Zofran as needed Tylenol for pain.  Patient was discharged in stable condition to extended-care facility for further management.    Discharge process took about 40 minutes     Pertinent Physical Exam At Time of Discharge  Physical Exam  Constitutional:       Appearance: Normal appearance.   HENT:      Head: Normocephalic.      Right Ear: External ear normal.      Left Ear: External ear normal.      Nose: Rhinorrhea present.      Mouth/Throat:      Mouth: Mucous membranes are moist.   Eyes:      Extraocular Movements: Extraocular movements intact.      Conjunctiva/sclera: Conjunctivae normal.      Pupils: Pupils are equal, round, and reactive to light.   Cardiovascular:      Rate and Rhythm: Normal rate. Rhythm irregular.      Heart sounds: No murmur heard.  Pulmonary:      Effort: Pulmonary effort is normal.      Breath sounds: Normal breath sounds.   Abdominal:      General: Abdomen is flat.      Palpations: Abdomen is soft.   Musculoskeletal:      Cervical back: Normal range of motion.      Left lower leg: Edema present.      Comments: Significant kyphosis of the spine   Skin:     General: Skin is warm.   Neurological:      General: No focal deficit present.      Mental Status: She is alert.      Comments: General frailty and weakness.   Psychiatric:         Mood and Affect: Mood normal.   Outpatient Follow-Up  No future appointments.      Camille Ovalle MD

## 2023-12-06 NOTE — NURSING NOTE
Ultrasound came upstairs to complete abdominal ultrasound, both patient and daughter refused the ultrasound. IMS notified

## 2023-12-09 NOTE — DOCUMENTATION CLARIFICATION NOTE
PATIENT:               WESLEY LOGAN  ACCT #:                  [id number]  MRN:                       [id number]  :                       10/18/1930  ADMIT DATE:       2023 9:30 PM  DISCH DATE:        2023 2:45 PM  RESPONDING PROVIDER #:        [id number]          PROVIDER RESPONSE TEXT:    Acute Kidney Injury    CDI QUERY TEXT:    [facility]    Clinical Information:      Instruction:    Based on your assessment of the patient and the clinical information, please provide the requested documentation by clicking on the appropriate radio button and enter any additional information if prompted.    Question: Please clarify a diagnosis for the patient renal status    When answering this query, please exercise your independent professional judgment. The fact that a question is being asked, does not imply that any particular answer is desired or expected.    The patient's clinical indicators include:  Clinical Information: 89 yr. old admitted with atrial fibrillation with rvr, COVID-19 infection with ?pneumonia, Acute metabolic encephalopathy.    Clinical Indicators:  Bun/cr on admit:23  20/ 0.81 GFR 68  23 Bun/Cr 38/0.96 GFR 55  23 Bun/Cr 50/1.35 GFR 37    Treatment: IV  cc FB, IV Lopressor, Paxlovid. Monitor.    Risk Factors: Medications, Covid  Options provided:  -- Acute Kidney Injury  -- Chronic Kidney Disease, Please specify stage below  -- MITUL on CKD, Please specify stage below  -- Other - I will add my own diagnosis  -- Refer to Clinical Documentation Reviewer    Query created by: Lydia Castañeda on 2023 12:45 PM      Electronically signed by:  RUTH JORDAN MD 2023 8:19 PM

## 2024-08-11 ENCOUNTER — HOSPITAL ENCOUNTER (EMERGENCY)
Facility: HOSPITAL | Age: 89
Discharge: HOME | End: 2024-08-11
Attending: STUDENT IN AN ORGANIZED HEALTH CARE EDUCATION/TRAINING PROGRAM
Payer: MEDICARE

## 2024-08-11 ENCOUNTER — APPOINTMENT (OUTPATIENT)
Dept: RADIOLOGY | Facility: HOSPITAL | Age: 89
End: 2024-08-11
Payer: MEDICARE

## 2024-08-11 VITALS
BODY MASS INDEX: 21.85 KG/M2 | RESPIRATION RATE: 18 BRPM | TEMPERATURE: 97.3 F | HEIGHT: 64 IN | HEART RATE: 105 BPM | SYSTOLIC BLOOD PRESSURE: 165 MMHG | DIASTOLIC BLOOD PRESSURE: 94 MMHG | WEIGHT: 128 LBS | OXYGEN SATURATION: 96 %

## 2024-08-11 DIAGNOSIS — S32.040A COMPRESSION FRACTURE OF L4 VERTEBRA, INITIAL ENCOUNTER (MULTI): Primary | ICD-10-CM

## 2024-08-11 DIAGNOSIS — N28.9 RENAL LESION: ICD-10-CM

## 2024-08-11 DIAGNOSIS — R03.0 ELEVATED BLOOD PRESSURE READING: ICD-10-CM

## 2024-08-11 PROCEDURE — 2500000005 HC RX 250 GENERAL PHARMACY W/O HCPCS: Performed by: NURSE PRACTITIONER

## 2024-08-11 PROCEDURE — 72131 CT LUMBAR SPINE W/O DYE: CPT | Performed by: RADIOLOGY

## 2024-08-11 PROCEDURE — 2500000001 HC RX 250 WO HCPCS SELF ADMINISTERED DRUGS (ALT 637 FOR MEDICARE OP): Performed by: NURSE PRACTITIONER

## 2024-08-11 PROCEDURE — 99284 EMERGENCY DEPT VISIT MOD MDM: CPT

## 2024-08-11 PROCEDURE — 72131 CT LUMBAR SPINE W/O DYE: CPT

## 2024-08-11 RX ORDER — OXYCODONE HYDROCHLORIDE 5 MG/1
2.5 TABLET ORAL ONCE
Status: COMPLETED | OUTPATIENT
Start: 2024-08-11 | End: 2024-08-11

## 2024-08-11 RX ORDER — LIDOCAINE 560 MG/1
1 PATCH PERCUTANEOUS; TOPICAL; TRANSDERMAL ONCE
Status: DISCONTINUED | OUTPATIENT
Start: 2024-08-11 | End: 2024-08-11 | Stop reason: HOSPADM

## 2024-08-11 RX ORDER — OXYCODONE HYDROCHLORIDE 5 MG/1
5 TABLET ORAL EVERY 6 HOURS PRN
Qty: 12 TABLET | Refills: 0 | Status: SHIPPED | OUTPATIENT
Start: 2024-08-11 | End: 2024-08-14

## 2024-08-11 RX ORDER — LIDOCAINE 560 MG/1
1 PATCH PERCUTANEOUS; TOPICAL; TRANSDERMAL DAILY
Qty: 7 PATCH | Refills: 0 | Status: SHIPPED | OUTPATIENT
Start: 2024-08-11

## 2024-08-11 ASSESSMENT — PAIN DESCRIPTION - FREQUENCY: FREQUENCY: CONSTANT/CONTINUOUS

## 2024-08-11 ASSESSMENT — PAIN DESCRIPTION - PAIN TYPE: TYPE: ACUTE PAIN

## 2024-08-11 ASSESSMENT — PAIN DESCRIPTION - LOCATION: LOCATION: BACK

## 2024-08-11 ASSESSMENT — PAIN - FUNCTIONAL ASSESSMENT: PAIN_FUNCTIONAL_ASSESSMENT: 0-10

## 2024-08-11 ASSESSMENT — LIFESTYLE VARIABLES
TOTAL SCORE: 0
EVER HAD A DRINK FIRST THING IN THE MORNING TO STEADY YOUR NERVES TO GET RID OF A HANGOVER: NO
HAVE PEOPLE ANNOYED YOU BY CRITICIZING YOUR DRINKING: NO
EVER FELT BAD OR GUILTY ABOUT YOUR DRINKING: NO
HAVE YOU EVER FELT YOU SHOULD CUT DOWN ON YOUR DRINKING: NO

## 2024-08-11 ASSESSMENT — VISUAL ACUITY: OU: 1

## 2024-08-11 ASSESSMENT — PAIN SCALES - GENERAL
PAINLEVEL_OUTOF10: 7
PAINLEVEL_OUTOF10: 6

## 2024-08-11 ASSESSMENT — PAIN DESCRIPTION - ORIENTATION: ORIENTATION: LOWER;MID

## 2024-08-11 ASSESSMENT — PAIN DESCRIPTION - DESCRIPTORS: DESCRIPTORS: SHARP

## 2024-08-11 NOTE — ED PROVIDER NOTES
"HPI   Chief Complaint   Patient presents with    Back Pain     Lower back pain just above tailbone. Reached forward to grab something 4 days ago and felt a \"pulling\" sensation in back. Has taken tylenol and used icy/hot cream at home without relief.        Patient presents the emergency department in the care of her daughter for evaluation of low back pain.  This occurred 4 days ago when she was bent over to trim a dl bush and she felt a pop in her low back.  There was no associated traumatic fall.  Patient is anticoagulated with Eliquis for atrial fibrillation.  She does also have osteoporosis with no previous fracture, surgical intervention or manipulation of the lumbar spine.  There has been no There has been no associated syncope, fever, chills, chest pain, shortness of breath, abdominal pain, vomiting, bowel changes, loss of bowel or bladder control, urinary retention, saddle paresthesia, numbness, leg weakness or ataxia.  She has been using Tylenol, Bengay, IcyHot and heat for her symptoms with persistence of her discomfort therefore they came to the emergency department for evaluation.  Otherwise patient has been able to continue to ambulate with her cane and complete ADLs.      History provided by:  Patient          Patient History   History reviewed. No pertinent past medical history.  History reviewed. No pertinent surgical history.  No family history on file.  Social History     Tobacco Use    Smoking status: Never    Smokeless tobacco: Never   Substance Use Topics    Alcohol use: Never    Drug use: Never       Physical Exam   ED Triage Vitals [08/11/24 1820]   Temperature Heart Rate Respirations BP   36.3 °C (97.3 °F) 100 16 162/90      Pulse Ox Temp Source Heart Rate Source Patient Position   96 % Temporal Monitor --      BP Location FiO2 (%)     -- --       Physical Exam  Vitals reviewed.   Constitutional:       Appearance: She is not toxic-appearing.   HENT:      Head: Normocephalic and atraumatic. "      Right Ear: Hearing normal.      Left Ear: Hearing normal.      Nose: Nose normal.      Mouth/Throat:      Lips: Pink.      Mouth: Mucous membranes are moist.      Pharynx: Oropharynx is clear.   Eyes:      General: Vision grossly intact.   Cardiovascular:      Rate and Rhythm: Normal rate.      Pulses:           Posterior tibial pulses are 2+ on the right side and 2+ on the left side.   Pulmonary:      Effort: Pulmonary effort is normal.      Breath sounds: Normal breath sounds.   Abdominal:      Palpations: Abdomen is soft. There is no pulsatile mass.      Tenderness: There is no abdominal tenderness.   Musculoskeletal:      Cervical back: Normal range of motion and neck supple.      Comments: Patient is able to stand up out of the wheelchair and bear full weight ambulate with her stick cane.  She has no pain upon  manipulation of the pelvis, bilateral hips and bilateral lower extremities.  She does have some swelling over the lower lumbar midline spine without ecchymosis or outside wound.  There is no midline vertebral tenderness through the thoracic, lumbar or sacrum.  No outward sign of trauma including between the buttocks and no obvious abscess.  Patient is able to seat herself back down in the wheelchair.   Skin:     General: Skin is warm and dry.      Capillary Refill: Capillary refill takes less than 2 seconds.      Findings: No wound.   Neurological:      Mental Status: She is alert and oriented to person, place, and time.      Sensory: Sensation is intact.      Motor: Motor function is intact.      Coordination: Coordination is intact.      Gait: Gait is intact.      Comments: Dorsiflexion and plantarflexion strong and equal bilaterally.  Straight leg raise intact bilaterally.  Ambulatory with a steady gait no ataxia or foot drop.   Psychiatric:         Behavior: Behavior is cooperative.           ED Course & MDM   ED Course as of 08/11/24 2140   Sun Aug 11, 2024   2129 Patient updated on her CT  result and incidental findings.  Awaiting consultation with orthospine through the transfer center for disposition.  Patient did do well transferring to the toilet and urinating.  She has been awake and alert with a 2.5 mg of oxycodone.  Given her blood pressure and heart rate and persistence of discomfort with toleration of the medication, she was given the additional 2.5 mg of oxycodone while awaiting disposition. [NA]   2132 Spoke with Dr. Foster via the transfer line. Does not require any surgical intervention nor does her recommend any TLSO brace.  [NA]      ED Course User Index  [NA] Allison Sherwood, APRN-CNP         Diagnoses as of 08/11/24 2140   Compression fracture of L4 vertebra, initial encounter (Multi)   Renal lesion   Elevated blood pressure reading                 No data recorded     [place] Coma Scale Score: 15 (08/11/24 1902 : Elijah Arredondo RN)                           Medical Decision Making  Patient presents to the emergency department in the care of her daughter for evaluation of midline low back pain for the last several days after bending forward and feeling a pop in a pull.  Differential diagnosis of compression fracture, epidural abscess and lumbar strain to mention a few.  Will start with a CT of the lumbar spine without IV contrast and symptom management a Lidoderm patch, oxycodone 2.5 mg and ice as she has taken Tylenol prior to arrival and she is on anticoagulation.  Will also have patient evaluated by ER attending for consideration of further given her anticoagulant use and mild swelling over the lumbar spine.        Procedure  Procedures       Your medication list        START taking these medications        Instructions Last Dose Given Next Dose Due   lidocaine 4 % patch      Place 1 patch over 12 hours on the skin once daily. Place on area of maximum pain. Remove & discard patch within 12 hours.       oxyCODONE 5 mg immediate release tablet  Commonly known as: Roxicodone       Take 1 tablet (5 mg) by mouth every 6 hours if needed for severe pain (7 - 10) for up to 3 days.              ASK your doctor about these medications        Instructions Last Dose Given Next Dose Due   alum-mag hydroxide-simeth 200-200-20 mg/5 mL oral suspension  Commonly known as: Mylanta      Take 20 mL by mouth 4 times a day as needed for indigestion or heartburn.       apixaban 2.5 mg tablet  Commonly known as: Eliquis           [place] Thyroid 60 mg tablet  Generic drug: thyroid (pork)           losartan 25 mg tablet  Commonly known as: Cozaar           metoprolol succinate  mg 24 hr tablet  Commonly known as: Toprol-XL           ondansetron 4 mg tablet  Commonly known as: Zofran      Take 1 tablet (4 mg) by mouth every 8 hours if needed for nausea or vomiting.                 Where to Get Your Medications        These medications were sent to [facility]/pharmacy #[id number] - [place], [place] - [address]  [address] [address]      Phone: [phone]   lidocaine 4 % patch  oxyCODONE 5 mg immediate release tablet            Allison Sherwood, APRN-CNP  08/11/24 5922

## 2024-08-14 ENCOUNTER — TELEPHONE (OUTPATIENT)
Dept: EMERGENCY MEDICINE | Facility: HOSPITAL | Age: 89
End: 2024-08-14
Payer: MEDICARE

## 2024-08-14 NOTE — ED NOTES
Called daughter in regards to message received questioning a back brace.  They were ensuring that they were not ordered one in which they were not.  They are encouraged to call today to make a follow-up appointment with orthospine and are aware of how to better do a pain regimen at home with over-the-counter Tylenol with the oxycodone for breakthrough pain, ice and can consider of Velcro lumbar support brace which they do have at home. Daughter verbalizes understanding of instructions.     Allison Sherwood, SHRUTI-CNP  08/14/24 1145    
no

## 2025-08-05 ENCOUNTER — APPOINTMENT (OUTPATIENT)
Dept: CARDIOLOGY | Facility: HOSPITAL | Age: OVER 89
DRG: 064 | End: 2025-08-05
Payer: MEDICARE

## 2025-08-05 ENCOUNTER — APPOINTMENT (OUTPATIENT)
Dept: RADIOLOGY | Facility: HOSPITAL | Age: OVER 89
DRG: 064 | End: 2025-08-05
Payer: MEDICARE

## 2025-08-05 ENCOUNTER — HOSPITAL ENCOUNTER (INPATIENT)
Facility: HOSPITAL | Age: OVER 89
End: 2025-08-05
Attending: EMERGENCY MEDICINE | Admitting: INTERNAL MEDICINE
Payer: MEDICARE

## 2025-08-05 DIAGNOSIS — I63.89 OTHER CEREBRAL INFARCTION: ICD-10-CM

## 2025-08-05 DIAGNOSIS — N17.9 ACUTE KIDNEY INJURY: ICD-10-CM

## 2025-08-05 DIAGNOSIS — I48.91 ATRIAL FIBRILLATION WITH RAPID VENTRICULAR RESPONSE (MULTI): ICD-10-CM

## 2025-08-05 DIAGNOSIS — I48.20 CHRONIC ATRIAL FIBRILLATION (MULTI): ICD-10-CM

## 2025-08-05 DIAGNOSIS — R53.1 GENERALIZED WEAKNESS: ICD-10-CM

## 2025-08-05 DIAGNOSIS — I63.9 ACUTE CVA (CEREBROVASCULAR ACCIDENT) (MULTI): ICD-10-CM

## 2025-08-05 DIAGNOSIS — I61.9 INTRAPARENCHYMAL HEMORRHAGE OF BRAIN (MULTI): ICD-10-CM

## 2025-08-05 DIAGNOSIS — R41.0 DELIRIUM: Primary | ICD-10-CM

## 2025-08-05 DIAGNOSIS — G45.9 TRANSIENT CEREBRAL ISCHEMIC ATTACK, UNSPECIFIED: ICD-10-CM

## 2025-08-05 DIAGNOSIS — N39.0 URINARY TRACT INFECTION WITH HEMATURIA, SITE UNSPECIFIED: ICD-10-CM

## 2025-08-05 DIAGNOSIS — R40.2421 GLASGOW COMA SCALE TOTAL SCORE 9-12, IN THE FIELD (EMT OR AMBULANCE): ICD-10-CM

## 2025-08-05 DIAGNOSIS — R31.9 URINARY TRACT INFECTION WITH HEMATURIA, SITE UNSPECIFIED: ICD-10-CM

## 2025-08-05 LAB
AMORPH CRY #/AREA UR COMP ASSIST: ABNORMAL /HPF
ANION GAP BLDV CALCULATED.4IONS-SCNC: 10 MMOL/L (ref 10–25)
ANION GAP SERPL CALC-SCNC: 15 MMOL/L (ref 10–20)
APPEARANCE UR: CLEAR
BASE EXCESS BLDV CALC-SCNC: -1.1 MMOL/L (ref -2–3)
BASOPHILS # BLD AUTO: 0.02 X10*3/UL (ref 0–0.1)
BASOPHILS NFR BLD AUTO: 0.3 %
BILIRUB UR STRIP.AUTO-MCNC: NEGATIVE MG/DL
BODY TEMPERATURE: 37 DEGREES CELSIUS
BUN SERPL-MCNC: 39 MG/DL (ref 6–23)
CA-I BLDV-SCNC: 1 MMOL/L (ref 1.1–1.33)
CALCIUM SERPL-MCNC: 8.1 MG/DL (ref 8.6–10.3)
CHLORIDE BLDV-SCNC: 102 MMOL/L (ref 98–107)
CHLORIDE SERPL-SCNC: 101 MMOL/L (ref 98–107)
CO2 SERPL-SCNC: 23 MMOL/L (ref 21–32)
COLOR UR: ABNORMAL
CREAT SERPL-MCNC: 2.07 MG/DL (ref 0.5–1.05)
EGFRCR SERPLBLD CKD-EPI 2021: 22 ML/MIN/1.73M*2
EOSINOPHIL # BLD AUTO: 0.01 X10*3/UL (ref 0–0.4)
EOSINOPHIL NFR BLD AUTO: 0.1 %
ERYTHROCYTE [DISTWIDTH] IN BLOOD BY AUTOMATED COUNT: 13.3 % (ref 11.5–14.5)
GLUCOSE BLDV-MCNC: 103 MG/DL (ref 74–99)
GLUCOSE SERPL-MCNC: 102 MG/DL (ref 74–99)
GLUCOSE UR STRIP.AUTO-MCNC: NORMAL MG/DL
HCO3 BLDV-SCNC: 23.5 MMOL/L (ref 22–26)
HCT VFR BLD AUTO: 36.6 % (ref 36–46)
HCT VFR BLD EST: 40 % (ref 36–46)
HGB BLD-MCNC: 12.8 G/DL (ref 12–16)
HGB BLDV-MCNC: 13.2 G/DL (ref 12–16)
HYALINE CASTS #/AREA URNS AUTO: ABNORMAL /LPF
IMM GRANULOCYTES # BLD AUTO: 0.05 X10*3/UL (ref 0–0.5)
IMM GRANULOCYTES NFR BLD AUTO: 0.7 % (ref 0–0.9)
INHALED O2 CONCENTRATION: 21 %
KETONES UR STRIP.AUTO-MCNC: ABNORMAL MG/DL
LACTATE BLDV-SCNC: 1.3 MMOL/L (ref 0.4–2)
LEUKOCYTE ESTERASE UR QL STRIP.AUTO: NEGATIVE
LYMPHOCYTES # BLD AUTO: 0.99 X10*3/UL (ref 0.8–3)
LYMPHOCYTES NFR BLD AUTO: 13.8 %
MCH RBC QN AUTO: 35.3 PG (ref 26–34)
MCHC RBC AUTO-ENTMCNC: 35 G/DL (ref 32–36)
MCV RBC AUTO: 101 FL (ref 80–100)
MONOCYTES # BLD AUTO: 0.71 X10*3/UL (ref 0.05–0.8)
MONOCYTES NFR BLD AUTO: 9.9 %
MUCOUS THREADS #/AREA URNS AUTO: ABNORMAL /LPF
NEUTROPHILS # BLD AUTO: 5.4 X10*3/UL (ref 1.6–5.5)
NEUTROPHILS NFR BLD AUTO: 75.2 %
NITRITE UR QL STRIP.AUTO: NEGATIVE
NRBC BLD-RTO: 0 /100 WBCS (ref 0–0)
OXYHGB MFR BLDV: 81.7 % (ref 45–75)
PCO2 BLDV: 38 MM HG (ref 41–51)
PH BLDV: 7.4 PH (ref 7.33–7.43)
PH UR STRIP.AUTO: 5.5 [PH]
PLATELET # BLD AUTO: 161 X10*3/UL (ref 150–450)
PO2 BLDV: 51 MM HG (ref 35–45)
POTASSIUM BLDV-SCNC: 3.9 MMOL/L (ref 3.5–5.3)
POTASSIUM SERPL-SCNC: 3.8 MMOL/L (ref 3.5–5.3)
PROT UR STRIP.AUTO-MCNC: ABNORMAL MG/DL
RBC # BLD AUTO: 3.63 X10*6/UL (ref 4–5.2)
RBC # UR STRIP.AUTO: ABNORMAL MG/DL
RBC #/AREA URNS AUTO: ABNORMAL /HPF
SAO2 % BLDV: 84 % (ref 45–75)
SODIUM BLDV-SCNC: 132 MMOL/L (ref 136–145)
SODIUM SERPL-SCNC: 135 MMOL/L (ref 136–145)
SP GR UR STRIP.AUTO: 1.02
UROBILINOGEN UR STRIP.AUTO-MCNC: NORMAL MG/DL
WBC # BLD AUTO: 7.2 X10*3/UL (ref 4.4–11.3)
WBC #/AREA URNS AUTO: ABNORMAL /HPF

## 2025-08-05 PROCEDURE — 70544 MR ANGIOGRAPHY HEAD W/O DYE: CPT | Performed by: RADIOLOGY

## 2025-08-05 PROCEDURE — 83036 HEMOGLOBIN GLYCOSYLATED A1C: CPT | Mod: PORLAB | Performed by: INTERNAL MEDICINE

## 2025-08-05 PROCEDURE — 99223 1ST HOSP IP/OBS HIGH 75: CPT | Performed by: NURSE PRACTITIONER

## 2025-08-05 PROCEDURE — 2060000001 HC INTERMEDIATE ICU ROOM DAILY

## 2025-08-05 PROCEDURE — 70450 CT HEAD/BRAIN W/O DYE: CPT | Performed by: STUDENT IN AN ORGANIZED HEALTH CARE EDUCATION/TRAINING PROGRAM

## 2025-08-05 PROCEDURE — 70450 CT HEAD/BRAIN W/O DYE: CPT

## 2025-08-05 PROCEDURE — 70547 MR ANGIOGRAPHY NECK W/O DYE: CPT | Performed by: RADIOLOGY

## 2025-08-05 PROCEDURE — 76770 US EXAM ABDO BACK WALL COMP: CPT

## 2025-08-05 PROCEDURE — 70547 MR ANGIOGRAPHY NECK W/O DYE: CPT

## 2025-08-05 PROCEDURE — 2500000004 HC RX 250 GENERAL PHARMACY W/ HCPCS (ALT 636 FOR OP/ED): Performed by: NURSE PRACTITIONER

## 2025-08-05 PROCEDURE — 84132 ASSAY OF SERUM POTASSIUM: CPT | Performed by: EMERGENCY MEDICINE

## 2025-08-05 PROCEDURE — 71045 X-RAY EXAM CHEST 1 VIEW: CPT

## 2025-08-05 PROCEDURE — 96365 THER/PROPH/DIAG IV INF INIT: CPT

## 2025-08-05 PROCEDURE — 99285 EMERGENCY DEPT VISIT HI MDM: CPT | Mod: 25 | Performed by: EMERGENCY MEDICINE

## 2025-08-05 PROCEDURE — 81001 URINALYSIS AUTO W/SCOPE: CPT | Performed by: EMERGENCY MEDICINE

## 2025-08-05 PROCEDURE — 70551 MRI BRAIN STEM W/O DYE: CPT | Performed by: RADIOLOGY

## 2025-08-05 PROCEDURE — 96361 HYDRATE IV INFUSION ADD-ON: CPT

## 2025-08-05 PROCEDURE — 70551 MRI BRAIN STEM W/O DYE: CPT

## 2025-08-05 PROCEDURE — 85025 COMPLETE CBC W/AUTO DIFF WBC: CPT | Performed by: EMERGENCY MEDICINE

## 2025-08-05 PROCEDURE — 93005 ELECTROCARDIOGRAM TRACING: CPT

## 2025-08-05 PROCEDURE — 36415 COLL VENOUS BLD VENIPUNCTURE: CPT | Performed by: EMERGENCY MEDICINE

## 2025-08-05 PROCEDURE — 71045 X-RAY EXAM CHEST 1 VIEW: CPT | Performed by: SURGERY

## 2025-08-05 PROCEDURE — 2500000001 HC RX 250 WO HCPCS SELF ADMINISTERED DRUGS (ALT 637 FOR MEDICARE OP): Performed by: NURSE PRACTITIONER

## 2025-08-05 PROCEDURE — 70450 CT HEAD/BRAIN W/O DYE: CPT | Performed by: RADIOLOGY

## 2025-08-05 PROCEDURE — 70544 MR ANGIOGRAPHY HEAD W/O DYE: CPT

## 2025-08-05 PROCEDURE — 2500000002 HC RX 250 W HCPCS SELF ADMINISTERED DRUGS (ALT 637 FOR MEDICARE OP, ALT 636 FOR OP/ED): Performed by: NURSE PRACTITIONER

## 2025-08-05 PROCEDURE — 2500000004 HC RX 250 GENERAL PHARMACY W/ HCPCS (ALT 636 FOR OP/ED): Performed by: EMERGENCY MEDICINE

## 2025-08-05 PROCEDURE — 99223 1ST HOSP IP/OBS HIGH 75: CPT | Performed by: PSYCHIATRY & NEUROLOGY

## 2025-08-05 RX ORDER — POLYETHYLENE GLYCOL 3350 17 G/17G
17 POWDER, FOR SOLUTION ORAL DAILY
Status: DISPENSED | OUTPATIENT
Start: 2025-08-05

## 2025-08-05 RX ORDER — CIPROFLOXACIN HYDROCHLORIDE 250 MG/1
TABLET, FILM COATED ORAL
Status: ON HOLD | COMMUNITY
Start: 2025-08-03

## 2025-08-05 RX ORDER — ASCORBIC ACID 500 MG
500 TABLET ORAL DAILY
Status: DISPENSED | OUTPATIENT
Start: 2025-08-05

## 2025-08-05 RX ORDER — LANOLIN ALCOHOL/MO/W.PET/CERES
1000 CREAM (GRAM) TOPICAL DAILY
Status: ON HOLD | COMMUNITY

## 2025-08-05 RX ORDER — TALC
3 POWDER (GRAM) TOPICAL NIGHTLY PRN
Status: ACTIVE | OUTPATIENT
Start: 2025-08-05

## 2025-08-05 RX ORDER — CHOLECALCIFEROL (VITAMIN D3) 25 MCG
25 TABLET ORAL DAILY
Status: DISPENSED | OUTPATIENT
Start: 2025-08-05

## 2025-08-05 RX ORDER — FERROUS SULFATE 325(65) MG
1 TABLET ORAL
Status: DISPENSED | OUTPATIENT
Start: 2025-08-06

## 2025-08-05 RX ORDER — LOSARTAN POTASSIUM 25 MG/1
25 TABLET ORAL DAILY
Status: DISPENSED | OUTPATIENT
Start: 2025-08-05

## 2025-08-05 RX ORDER — ONDANSETRON HYDROCHLORIDE 2 MG/ML
4 INJECTION, SOLUTION INTRAVENOUS EVERY 8 HOURS PRN
Status: ACTIVE | OUTPATIENT
Start: 2025-08-05

## 2025-08-05 RX ORDER — LANOLIN ALCOHOL/MO/W.PET/CERES
1000 CREAM (GRAM) TOPICAL DAILY
Status: DISPENSED | OUTPATIENT
Start: 2025-08-05

## 2025-08-05 RX ORDER — ASCORBIC ACID 500 MG
500 TABLET ORAL DAILY
Status: ON HOLD | COMMUNITY

## 2025-08-05 RX ORDER — PANTOPRAZOLE SODIUM 40 MG/10ML
40 INJECTION, POWDER, LYOPHILIZED, FOR SOLUTION INTRAVENOUS
Status: DISPENSED | OUTPATIENT
Start: 2025-08-06

## 2025-08-05 RX ORDER — METOPROLOL SUCCINATE 50 MG/1
100 TABLET, EXTENDED RELEASE ORAL DAILY
Status: DISCONTINUED | OUTPATIENT
Start: 2025-08-05 | End: 2025-08-08

## 2025-08-05 RX ORDER — ONDANSETRON 4 MG/1
4 TABLET, FILM COATED ORAL EVERY 8 HOURS PRN
Status: ACTIVE | OUTPATIENT
Start: 2025-08-05

## 2025-08-05 RX ORDER — GUAIFENESIN 600 MG/1
600 TABLET, EXTENDED RELEASE ORAL EVERY 12 HOURS PRN
Status: ACTIVE | OUTPATIENT
Start: 2025-08-05

## 2025-08-05 RX ORDER — CHOLECALCIFEROL (VITAMIN D3) 25 MCG
25 TABLET ORAL DAILY
Status: ON HOLD | COMMUNITY

## 2025-08-05 RX ORDER — PANTOPRAZOLE SODIUM 40 MG/1
40 TABLET, DELAYED RELEASE ORAL
Status: DISPENSED | OUTPATIENT
Start: 2025-08-06

## 2025-08-05 RX ORDER — LORAZEPAM 1 MG/1
1 TABLET ORAL ONCE
Status: COMPLETED | OUTPATIENT
Start: 2025-08-05 | End: 2025-08-05

## 2025-08-05 RX ORDER — BISACODYL 5 MG
10 TABLET, DELAYED RELEASE (ENTERIC COATED) ORAL DAILY PRN
Status: ACTIVE | OUTPATIENT
Start: 2025-08-05

## 2025-08-05 RX ORDER — CEFTRIAXONE 1 G/50ML
1 INJECTION, SOLUTION INTRAVENOUS ONCE
Status: COMPLETED | OUTPATIENT
Start: 2025-08-05 | End: 2025-08-05

## 2025-08-05 RX ORDER — NITROFURANTOIN MONOHYDRATE/MACROCRYSTALLINE 25; 75 MG/1; MG/1
CAPSULE ORAL
Status: ON HOLD | COMMUNITY
Start: 2025-08-02

## 2025-08-05 RX ORDER — SODIUM CHLORIDE 9 MG/ML
75 INJECTION, SOLUTION INTRAVENOUS CONTINUOUS
Status: ACTIVE | OUTPATIENT
Start: 2025-08-05 | End: 2025-08-06

## 2025-08-05 RX ORDER — LEVOTHYROXINE SODIUM 125 UG/1
125 TABLET ORAL DAILY
Status: ON HOLD | COMMUNITY

## 2025-08-05 RX ORDER — FERROUS SULFATE 325(65) MG
325 TABLET ORAL
Status: ON HOLD | COMMUNITY

## 2025-08-05 RX ORDER — LEVOTHYROXINE SODIUM 125 UG/1
125 TABLET ORAL DAILY
Status: DISPENSED | OUTPATIENT
Start: 2025-08-05

## 2025-08-05 RX ADMIN — LORAZEPAM 1 MG: 1 TABLET ORAL at 20:00

## 2025-08-05 RX ADMIN — METOPROLOL SUCCINATE 100 MG: 50 TABLET, EXTENDED RELEASE ORAL at 14:30

## 2025-08-05 RX ADMIN — CEFTRIAXONE 1 G: 1 INJECTION, SOLUTION INTRAVENOUS at 04:07

## 2025-08-05 RX ADMIN — SODIUM CHLORIDE 1000 ML: 0.9 INJECTION, SOLUTION INTRAVENOUS at 03:57

## 2025-08-05 RX ADMIN — SODIUM CHLORIDE 75 ML/HR: 0.9 INJECTION, SOLUTION INTRAVENOUS at 13:51

## 2025-08-05 RX ADMIN — LEVOTHYROXINE SODIUM 125 MCG: 0.12 TABLET ORAL at 14:27

## 2025-08-05 RX ADMIN — LOSARTAN POTASSIUM 25 MG: 25 TABLET, FILM COATED ORAL at 14:29

## 2025-08-05 SDOH — SOCIAL STABILITY: SOCIAL INSECURITY
WITHIN THE LAST YEAR, HAVE YOU BEEN KICKED, HIT, SLAPPED, OR OTHERWISE PHYSICALLY HURT BY YOUR PARTNER OR EX-PARTNER?: PATIENT UNABLE TO ANSWER

## 2025-08-05 SDOH — SOCIAL STABILITY: SOCIAL INSECURITY: WERE YOU ABLE TO COMPLETE ALL THE BEHAVIORAL HEALTH SCREENINGS?: YES

## 2025-08-05 SDOH — SOCIAL STABILITY: SOCIAL INSECURITY
WITHIN THE LAST YEAR, HAVE YOU BEEN RAPED OR FORCED TO HAVE ANY KIND OF SEXUAL ACTIVITY BY YOUR PARTNER OR EX-PARTNER?: PATIENT UNABLE TO ANSWER

## 2025-08-05 SDOH — ECONOMIC STABILITY: FOOD INSECURITY
WITHIN THE PAST 12 MONTHS, YOU WORRIED THAT YOUR FOOD WOULD RUN OUT BEFORE YOU GOT THE MONEY TO BUY MORE.: PATIENT UNABLE TO ANSWER

## 2025-08-05 SDOH — ECONOMIC STABILITY: INCOME INSECURITY
IN THE PAST 12 MONTHS HAS THE ELECTRIC, GAS, OIL, OR WATER COMPANY THREATENED TO SHUT OFF SERVICES IN YOUR HOME?: PATIENT UNABLE TO ANSWER

## 2025-08-05 SDOH — SOCIAL STABILITY: SOCIAL INSECURITY: WITHIN THE LAST YEAR, HAVE YOU BEEN AFRAID OF YOUR PARTNER OR EX-PARTNER?: PATIENT UNABLE TO ANSWER

## 2025-08-05 SDOH — SOCIAL STABILITY: SOCIAL INSECURITY
WITHIN THE LAST YEAR, HAVE YOU BEEN HUMILIATED OR EMOTIONALLY ABUSED IN OTHER WAYS BY YOUR PARTNER OR EX-PARTNER?: PATIENT UNABLE TO ANSWER

## 2025-08-05 SDOH — ECONOMIC STABILITY: FOOD INSECURITY
WITHIN THE PAST 12 MONTHS, THE FOOD YOU BOUGHT JUST DIDN'T LAST AND YOU DIDN'T HAVE MONEY TO GET MORE.: PATIENT UNABLE TO ANSWER

## 2025-08-05 SDOH — SOCIAL STABILITY: SOCIAL INSECURITY: HAVE YOU HAD THOUGHTS OF HARMING ANYONE ELSE?: UNABLE TO ASSESS

## 2025-08-05 ASSESSMENT — PAIN SCALES - GENERAL
PAINLEVEL_OUTOF10: 0 - NO PAIN

## 2025-08-05 ASSESSMENT — LIFESTYLE VARIABLES
HOW OFTEN DO YOU HAVE A DRINK CONTAINING ALCOHOL: PATIENT UNABLE TO ANSWER
EVER HAD A DRINK FIRST THING IN THE MORNING TO STEADY YOUR NERVES TO GET RID OF A HANGOVER: NO
HOW MANY STANDARD DRINKS CONTAINING ALCOHOL DO YOU HAVE ON A TYPICAL DAY: PATIENT UNABLE TO ANSWER
SUBSTANCE_ABUSE_PAST_12_MONTHS: NO
TOTAL SCORE: 0
AUDIT-C TOTAL SCORE: -1
HAVE PEOPLE ANNOYED YOU BY CRITICIZING YOUR DRINKING: NO
AUDIT-C TOTAL SCORE: -1
SKIP TO QUESTIONS 9-10: 0
HOW OFTEN DO YOU HAVE 6 OR MORE DRINKS ON ONE OCCASION: PATIENT UNABLE TO ANSWER
EVER FELT BAD OR GUILTY ABOUT YOUR DRINKING: NO
PRESCIPTION_ABUSE_PAST_12_MONTHS: NO
HAVE YOU EVER FELT YOU SHOULD CUT DOWN ON YOUR DRINKING: NO

## 2025-08-05 ASSESSMENT — ACTIVITIES OF DAILY LIVING (ADL)
DRESSING YOURSELF: UNABLE TO ASSESS
HEARING - LEFT EAR: DIFFICULTY WITH NOISE
ADEQUATE_TO_COMPLETE_ADL: UNABLE TO ASSESS
WALKS IN HOME: UNABLE TO ASSESS
GROOMING: UNABLE TO ASSESS
TOILETING: UNABLE TO ASSESS
PATIENT'S MEMORY ADEQUATE TO SAFELY COMPLETE DAILY ACTIVITIES?: UNABLE TO ASSESS
BATHING: UNABLE TO ASSESS
FEEDING YOURSELF: UNABLE TO ASSESS
JUDGMENT_ADEQUATE_SAFELY_COMPLETE_DAILY_ACTIVITIES: UNABLE TO ASSESS
LACK_OF_TRANSPORTATION: PATIENT UNABLE TO ANSWER
HEARING - RIGHT EAR: DIFFICULTY WITH NOISE

## 2025-08-05 ASSESSMENT — COGNITIVE AND FUNCTIONAL STATUS - GENERAL
EATING MEALS: A LOT
TURNING FROM BACK TO SIDE WHILE IN FLAT BAD: A LOT
STANDING UP FROM CHAIR USING ARMS: A LOT
TOILETING: A LOT
EATING MEALS: A LOT
WALKING IN HOSPITAL ROOM: A LOT
DAILY ACTIVITIY SCORE: 12
DRESSING REGULAR LOWER BODY CLOTHING: A LOT
PERSONAL GROOMING: A LOT
MOVING TO AND FROM BED TO CHAIR: A LOT
HELP NEEDED FOR BATHING: A LOT
PERSONAL GROOMING: A LOT
CLIMB 3 TO 5 STEPS WITH RAILING: A LOT
MOVING TO AND FROM BED TO CHAIR: A LOT
TOILETING: A LOT
MOBILITY SCORE: 12
HELP NEEDED FOR BATHING: A LOT
DAILY ACTIVITIY SCORE: 12
MOBILITY SCORE: 12
DRESSING REGULAR LOWER BODY CLOTHING: A LOT
DRESSING REGULAR UPPER BODY CLOTHING: A LOT
TURNING FROM BACK TO SIDE WHILE IN FLAT BAD: A LOT
MOVING FROM LYING ON BACK TO SITTING ON SIDE OF FLAT BED WITH BEDRAILS: A LOT
DRESSING REGULAR UPPER BODY CLOTHING: A LOT
STANDING UP FROM CHAIR USING ARMS: A LOT
WALKING IN HOSPITAL ROOM: A LOT
PATIENT BASELINE BEDBOUND: NO
CLIMB 3 TO 5 STEPS WITH RAILING: A LOT
MOVING FROM LYING ON BACK TO SITTING ON SIDE OF FLAT BED WITH BEDRAILS: A LOT

## 2025-08-05 ASSESSMENT — PAIN DESCRIPTION - PROGRESSION: CLINICAL_PROGRESSION: NOT CHANGED

## 2025-08-05 ASSESSMENT — PAIN - FUNCTIONAL ASSESSMENT
PAIN_FUNCTIONAL_ASSESSMENT: 0-10

## 2025-08-05 ASSESSMENT — PATIENT HEALTH QUESTIONNAIRE - PHQ9
SUM OF ALL RESPONSES TO PHQ9 QUESTIONS 1 & 2: 0
1. LITTLE INTEREST OR PLEASURE IN DOING THINGS: NOT AT ALL
2. FEELING DOWN, DEPRESSED OR HOPELESS: NOT AT ALL

## 2025-08-05 NOTE — ED PROVIDER NOTES
"HPI:  89-year-old female brought in by EMS due to concern for confusion.  Patient lives with daughter daughters at bedside and reports that she had hematuria couple days ago with started on a antibiotic but seemed to not tolerated the antibiotic as it made her off balance and act weird.  She was not really able to elaborate further than that she stopped that antibiotics and took her to her primary care provider who then sent a urine sample and started her on a second antibiotic agent although she is not able to tell me which 1 and she also states that she did not tolerate that agent well either.  Patient's daughter reports that she has been confused like doing things that are out of the norm for her she says that she is fidgeting with toilet tissue and not having much of an appetite like we will hold food to her mouth pretending to eat it but not really eating it also says that she is doing things \"backwards\" she says that when I tell her to do something she does not listen and will not do it.    Daughter reports patient is normally awake alert conversant and this behavior is atypical for her    Limitations to history: Limited from the patient as she is a poor historian  Independent Historians: Most of the history is obtained from daughter at bedside  External Records Reviewed: EMR records  ------------------------------------------------------------------------------------------------------------------------------------------  ROS: a ten point review of systems was performed and negative except as per HPI.  ------------------------------------------------------------------------------------------------------------------------------------------  PMH / PSH: as per HPI, reviewed in EMR and discussed with the patient []  MEDS:  reviewed in EMR and discussed with the patient []  ALLERGIES: reviewed in EMR[]  SocH:  as per HPI, otherwise reviewed in EMR []  FH:  as per HPI, otherwise reviewed in EMR " []  ------------------------------------------------------------------------------------------------------------------------------------------  Physical Exam:  VS: As documented in the triage note and EMR flowsheet from this visit was reviewed  General: Well appearing. No acute distress.   Eyes:  Extraocular movements grossly intact. No scleral icterus.   HEENT: Atraumatic. Normocephalic.    Neck: Supple. No gross masses  CV: RRR, audible S1/S2, 2+ symmetric peripheral pulses  Resp: Clear to auscultation bilaterally. No respiratory distress.  Non-labored respirations  GI: Soft, non-tender, non-distended, no rebound or gaurding  MSK: Symmetric muscle bulk. No gross step offs or deformities.  Skin: Warm, dry, no obvious rash.  Neuro: Speech fluent. Awake. Alert. Appropriate conversation.  Psych: Appropriate mood and affect for situation  ------------------------------------------------------------------------------------------------------------------------------------------  Hospital Course / Medical Decision Making:  Independent Interpretations:  EKG -   [XR, CT, US, etc - ]    [Patient is a *** year old *** with a PMHx of chronic medical conditions including *** presenting with a chief complaint of ***]    [Tests/Medications/Escalations of Care considered but not given]    Patient care discussed with: [Admitting team, consultants, social work, THRIVE, SANE, radiology]  Social Determinants affecting care: [Problems affording transportation, inability to afford prescriptions, lack of housing, lack of insurance]    Final diagnosis and disposition: []            Kassi Swartz MD

## 2025-08-05 NOTE — PROGRESS NOTES
Kaitlin Sarkar is a 89 y.o. female admitted for No Principal Problem: There is no principal problem currently on the Problem List. Please update the Problem List and refresh.. Pharmacy reviewed the patient's wehic-yx-hvrrnfkna medications and allergies for accuracy.    The list below reflects the PTA list prior to pharmacy medication history. A summary a changes to the PTA medication list has been listed below. Please review each medication in order reconciliation for additional clarification and justification.    Source of information: lauren, T2P daughter     Medications added:  Levothyroxine 125 mcg tab every day  Vitamin B-12 1,000 mcg tab every day    Vitamin D3 25 mcg tablet every day   Iron 65 mg tablet every day   Calcium tablet once daily   Vitamin C 500 mg tablet every day       Medications modified:    Medications to be removed:  Mylanta 20 mL oral suspension  Lidocaine patch   Zofran 4 mg tab  [place] thyroid 60 mg tab     Medications of concern:      Prior to Admission Medications   Prescriptions Last Dose Informant Patient Reported? Taking?   Cipro 250 mg tablet   Yes Yes   Sig: Take 1 tablet every 12 hours by oral route for 5 days.   Macrobid 100 mg capsule   Yes Yes   Sig: Take 1 capsule every 12 hours by oral route for 7 days.   alum-mag hydroxide-simeth (Mylanta) 200-200-20 mg/5 mL oral suspension   No No   Sig: Take 20 mL by mouth 4 times a day as needed for indigestion or heartburn.   apixaban (Eliquis) 2.5 mg tablet   Yes No   Sig: Take 1 tablet (2.5 mg) by mouth 2 times a day.   lidocaine 4 % patch   No No   Sig: Place 1 patch over 12 hours on the skin once daily. Place on area of maximum pain. Remove & discard patch within 12 hours.   losartan (Cozaar) 25 mg tablet   Yes No   Sig: Take 1 tablet (25 mg) by mouth once daily.   metoprolol succinate XL (Toprol-XL) 100 mg 24 hr tablet   Yes No   Sig: Take 1 tablet (100 mg) by mouth once daily. Do not crush or chew.   ondansetron (Zofran) 4 mg  tablet   No No   Sig: Take 1 tablet (4 mg) by mouth every 8 hours if needed for nausea or vomiting.   thyroid, pork, ([place] Thyroid) 60 mg tablet   Yes No   Sig: Take 1 tablet (60 mg) by mouth 3 times a day.      Facility-Administered Medications: None       May Watson

## 2025-08-05 NOTE — H&P
[facility] - GENERAL MEDICINE HISTORY AND PHYSICAL    HISTORY OF PRESENT ILLNESS     History Obtained From (Primary Source): Patient's daughter  Collateral History (Secondary Sources): D/w ED    History Of Present Illness (HPI):  Kaitlin Sarkar is a 89 y.o. female with PMHx s/f hypertension, atrial fibrillation, memory loss presenting with confusion.  Patient had increased confusion at home was doing things incorrectly stating things that were wrong worse than normal memory.  She had not been having any fevers or chills no diarrhea no vomiting.  She was probably not eating and drinking as much is normal.  She was taken to a [facility] found to have urinary tract infection started on some antibiotic patient continued to get worse.  In the emergency department chemistry panel showed an elevated creatinine there was no leukocytosis CT scan of the head was done there was findings of a 5 mm right anterior frontal lobe density possibly consistent with intraparenchymal hemorrhage.  A repeat CT of the head was done in an interval of about 4 hours.  The density appeared to be stable the ED provider discussed the case with Dr. Pagan of neurosurgery who recommended no acute surgical intervention.  ED provider gave patient Rocephin and normal saline Case was discussed between myself and the ED provider patient will be admitted for further evaluation and management.    ED Course:   Vitals on presentation: T 36.3 °C (97.3 °F)  HR (!) 107  BP (!) 170/98  RR 16  O2 98 % None (Room air)  Labs:   CBC with WBC 7.2, Hgb 12.8, Plts 161.   CMP with glucose 102, Na 135, K 3.8, BUN 39, sCr 2.07, alk phos No results found for requested labs within last 365 days., ALT No results found for requested labs within last 365 days., AST No results found for requested labs within last 365 days., bilirubin No results found for requested labs within last 365 days.. Magnesium No results found for requested labs within last 365 days..    BNP No results found for requested labs within last 365 days.. Trop No results found for requested labs within last 365 days..   Lactate No results found for requested labs within last 365 days.  EKG: Not available for review  Imaging - agree with radiology interpretation(s):   Interventions: Normal saline IV Rocephin    12-point ROS reviewed and found to be negative aside from aforementioned positives in HPI and/or noted in dedicated ROS section below.     Decision made to admit the patient to the hospitalist service after evaluation of the patient, review of the above, and discussion with ED provider.     LABS AND IMAGING     I have personally reviewed the following labs from 08/05/25: CBC and CMP  I have personally reviewed any obtained EKGs on 08/05/25, with my interpretation as listed above in the ED summary course.   I have personally reviewed the patient's vitals on presentation to the ED and any/all changes through to time of admission (on 08/05/25).     ED Course (From ED Provider):  Diagnoses as of 08/05/25 1109   Delirium   Acute kidney injury   Urinary tract infection with hematuria, site unspecified   Intraparenchymal hemorrhage of brain (Multi)     Relevant Results  Results for orders placed or performed during the hospital encounter of 08/05/25 (from the past 24 hours)   CBC and Auto Differential   Result Value Ref Range    WBC 7.2 4.4 - 11.3 x10*3/uL    nRBC 0.0 0.0 - 0.0 /100 WBCs    RBC 3.63 (L) 4.00 - 5.20 x10*6/uL    Hemoglobin 12.8 12.0 - 16.0 g/dL    Hematocrit 36.6 36.0 - 46.0 %     (H) 80 - 100 fL    MCH 35.3 (H) 26.0 - 34.0 pg    MCHC 35.0 32.0 - 36.0 g/dL    RDW 13.3 11.5 - 14.5 %    Platelets 161 150 - 450 x10*3/uL    Neutrophils % 75.2 40.0 - 80.0 %    Immature Granulocytes %, Automated 0.7 0.0 - 0.9 %    Lymphocytes % 13.8 13.0 - 44.0 %    Monocytes % 9.9 2.0 - 10.0 %    Eosinophils % 0.1 0.0 - 6.0 %    Basophils % 0.3 0.0 - 2.0 %    Neutrophils Absolute 5.40 1.60 - 5.50  x10*3/uL    Immature Granulocytes Absolute, Automated 0.05 0.00 - 0.50 x10*3/uL    Lymphocytes Absolute 0.99 0.80 - 3.00 x10*3/uL    Monocytes Absolute 0.71 0.05 - 0.80 x10*3/uL    Eosinophils Absolute 0.01 0.00 - 0.40 x10*3/uL    Basophils Absolute 0.02 0.00 - 0.10 x10*3/uL   Basic metabolic panel   Result Value Ref Range    Glucose 102 (H) 74 - 99 mg/dL    Sodium 135 (L) 136 - 145 mmol/L    Potassium 3.8 3.5 - 5.3 mmol/L    Chloride 101 98 - 107 mmol/L    Bicarbonate 23 21 - 32 mmol/L    Anion Gap 15 10 - 20 mmol/L    Urea Nitrogen 39 (H) 6 - 23 mg/dL    Creatinine 2.07 (H) 0.50 - 1.05 mg/dL    eGFR 22 (L) >60 mL/min/1.73m*2    Calcium 8.1 (L) 8.6 - 10.3 mg/dL   Blood Gas Venous Full Panel   Result Value Ref Range    POCT pH, Venous 7.40 7.33 - 7.43 pH    POCT pCO2, Venous 38 (L) 41 - 51 mm Hg    POCT pO2, Venous 51 (H) 35 - 45 mm Hg    POCT SO2, Venous 84 (H) 45 - 75 %    POCT Oxy Hemoglobin, Venous 81.7 (H) 45.0 - 75.0 %    POCT Hematocrit Calculated, Venous 40.0 36.0 - 46.0 %    POCT Sodium, Venous 132 (L) 136 - 145 mmol/L    POCT Potassium, Venous 3.9 3.5 - 5.3 mmol/L    POCT Chloride, Venous 102 98 - 107 mmol/L    POCT Ionized Calicum, Venous 1.00 (L) 1.10 - 1.33 mmol/L    POCT Glucose, Venous 103 (H) 74 - 99 mg/dL    POCT Lactate, Venous 1.3 0.4 - 2.0 mmol/L    POCT Base Excess, Venous -1.1 -2.0 - 3.0 mmol/L    POCT HCO3 Calculated, Venous 23.5 22.0 - 26.0 mmol/L    POCT Hemoglobin, Venous 13.2 12.0 - 16.0 g/dL    POCT Anion Gap, Venous 10.0 10.0 - 25.0 mmol/L    Patient Temperature 37.0 degrees Celsius    FiO2 21 %      Imaging  CT head wo IV contrast  Result Date: 8/5/2025  1.  Stable appearance 5 mm round focus of hypoattenuation anterior right frontal lobe, without evidence of edema or mass effect. This likely represents an unchanged parenchymal hemorrhage or a cavernoma. 2. Chronic microvascular ischemia and involutional changes.     Signed by: Davian Odom 8/5/2025 9:07 AM Dictation workstation:    [id number]    CT head wo IV contrast  Result Date: 8/5/2025  Punctate focus of increased density in the right frontal lobe not definitively seen on prior imaging, which may represent a small focus of hemorrhage. Consider interval CT follow-up in 4 hours to assess stability. No mass effect or midline shift.   Nonspecific scattered white matter hypodensities favored to represent sequela of small vessel ischemia.       MACRO: Evan Finkelstein discussed the significance and urgency of this critical finding by EPIC secure chat with  IRAIS PALACIOS on 8/5/2025 at 4:41 am.  (**-RCF-**) Findings:  See findings.   .   Signed by: Evan Finkelstein 8/5/2025 4:41 AM Dictation workstation:   [id number]    XR chest 1 view  Result Date: 8/5/2025  1.  No definite evidence of acute cardiopulmonary process. Similar cardiomegaly.       MACRO: None   Signed by: Ashish Sauceda 8/5/2025 2:53 AM Dictation workstation:   [id number]      Cardiology, Vascular, and Other Imaging  No other imaging results found for the past 2 days       PAST HISTORIES AND ALLERGIES     Past Medical History  She has no past medical history on file.    Surgical History  She has no past surgical history on file.     Social History  She reports that she has never smoked. She has never used smokeless tobacco. She reports that she does not drink alcohol and does not use drugs.    Family History  Family History[1]    Allergies  Penicillin    MEDICATIONS     Scheduled Medications:  Scheduled Medications[2]  Continuous Medications:  Continuous Medications[3]  PRN Medications:  PRN Medications[4]     REVIEW OF SYSTEMS     Review of Systems    OBJECTIVE     Last Recorded Vitals  /83 (BP Location: Left arm, Patient Position: Lying)   Pulse 86   Temp 36.3 °C (97.3 °F)   Resp 20   Wt 52.5 kg (115 lb 11.2 oz)   SpO2 98%      Physical Exam:  Vital signs and nursing notes reviewed.   Constitutional: Pleasant and cooperative. Laying in bed in no acute distress.  Conversant.   Skin: Warm and dry; no obvious lesions, rashes, pallor, or jaundice.   Eyes: EOMI. Anicteric sclera.   ENT: Mucous membranes moist; no obvious injury or deformity appreciated.   Head and Neck: Normocephalic, atraumatic. ROM preserved. Trachea midline. No appreciable JVD.   Respiratory: Nonlabored on RA. Lungs clear to auscultation bilaterally without obvious adventitious sounds. Chest rise is equal.  Cardiovascular: RRR. No gross murmur, gallop, or rub. Extremities are warm and well-perfused with good capillary refill (< 3 seconds). No chest wall tenderness.   GI: Abdomen soft, nontender, nondistended. No obvious organomegaly appreciated. Bowel sounds are present.  : No CVA tenderness.   MSK: No gross abnormalities appreciated. No limitations to AROM/PROM appreciated.   Extremities: No cyanosis, edema, or clubbing evident. Neurovascularly intact.   Neuro: Oriented to self only CN 2-12 grossly intact. Able to respond to questions  clearly. No acute focal neurologic deficits appreciated.  Psych: Appropriate mood and behavior.    ASSESSMENT AND PLAN   Assessment/Plan     89 y.o. female with PMHx s/f hypertension, atrial fibrillation, memory loss presenting with confusion.    Suspected urinary tract infection with acute kidney injury possible metabolic encephalopathy  Patient with UTI for outpatient testing which is not available for review  Urine analysis is pending we will also request renal ultrasound to rule out stones or obstructive uropathy  Continue patient on IV Rocephin, gentle hydration with normal saline    5 mm round focus of hypoattenuation anterior   right frontal lobe,  Possible area of hemorrhage  ED d/w neurosurgery felt to be stable  Consult neurology, unclear if clinically significant  Hold anticoagulants  No hx of fall  PT/OT eval  Monitor neuro status  Follow up with neuro surgery as outpt    AFIB  Continue rate control    HTN  Continue home medications       Yogi Blanton,  APRN-CNP    Dragon dictation software was used to dictate this note and thus there may be minor errors in translation/transcription including garbled speech or misspellings. Please contact for clarification if needed.         [1] No family history on file.  [2] [3] [4]

## 2025-08-05 NOTE — CONSULTS
"History Of Present Illness  Kaitlin Sarkar is a 89 y.o. female right handed admitted to [facility] on 8/5/25 presenting with behavioral change. Neurology consulted for abnormal head CT scan.    Last known well: few days ago  Had stroke symptoms resolved at time of presentation: No    Inpatient consult to Neurology  Consult performed by: Guanaco Torres MD  Consult ordered by: SHRUTI Watts-CNP      Listed history of hypertension, atrial fibrillation on anticoagulation, memory loss.    Patient seen this afternoon, daughter at the bedside.  She lives with her daughter at home.    Patient usually conversant, and ambulates with a cane because of low back pain.  Daughter states on Saturday, patient was not acting right.  She was having behavioral changes.  She was back to an urgent care, and was told she had UTI.  She was prescribed antibiotics.  She took the antibiotics, but they thought she was having a reaction to the antibiotic because \"she was getting worse\" behaviorally.  The next day, urgent care changed her to a different antibiotic.  She was somewhat getting better.  On 8/4/25, her behavior still was not any better, and so she was brought to [facility] ED for evaluation.  In the ER, initial vital signs documented blood pressure 170/98, heart rate 107, afebrile.  In the ED, head CT without contrast done showed punctate focus of increased density in the right frontal lobe not definitively seen on prior imaging, which may represent a small focus of hemorrhage.  No mass effect or midline shift.  Repeat head CT without contrast done a few hours later showed stable appearance of 5 mm round focus of hypoattenuation in the anterior right frontal lobe, without evidence of edema or mass effect, likely representing unchanged parenchymal hemorrhage or cavernoma.  Other laboratories showed creatinine of 1.35, which is abnormal for her.  There was no leukocytosis.  Neurosurgery was consulted, no surgical intervention, no need to " transfer, patient to stay at [facility].  Patient was admitted, she was given ceftriaxone to continue treatment for UTI.  Neurology was consulted.    Per daughter, patient was at her usual prior to getting altered mental status 2 days prior to admission when she was diagnosed as having UTI.  There was no known fall or head injury.  She is on apixaban for atrial fibrillation.  Daughter states they have been told anticoagulation needs to be held because of the head bleed.  They are apprehensive about stopping anticoagulation because of her A-fib and stroke risk.    Per daughter, today, behaviorally, she appears to be improving, but not quite back to normal.    Patient has had no prior known stroke or seizure.  Patient denies any headaches.    On my exam, she appears to be slow to respond. ?  Underlying hearing problem to begin with.    Former smoker when she was younger.  Denies alcohol use.  Denies street drug use/marijuana use.      Review of Systems   Constitutional:  Negative for appetite change, chills and fever.   HENT:  Negative for ear pain and nosebleeds.    Eyes:  Negative for discharge and itching.   Respiratory:  Negative for choking and chest tightness.    Cardiovascular:  Negative for chest pain and palpitations.   Gastrointestinal:  Negative for abdominal distention, abdominal pain and nausea.   Endocrine: Negative for cold intolerance and heat intolerance.   Genitourinary:  Negative for difficulty urinating and dysuria.   Musculoskeletal:  Negative for myalgias.   Neurological:  Negative for dizziness, seizures and numbness.     Past Medical History  Medical History[1]    Surgical History  Surgical History[2]    Social History  Social History[3]    Home Meds  Prescriptions Prior to Admission[4]    Allergies  Penicillin    Current Meds  Scheduled medications  Scheduled Medications[5]  Continuous medications  Continuous Medications[6]  PRN medications  PRN Medications[7]    Last Recorded Vitals  Blood  "pressure 154/76, pulse 80, temperature 36.1 °C (97 °F), temperature source Temporal, resp. rate 18, height 1.575 m (5' 2.01\"), weight 51.7 kg (113 lb 15.7 oz), SpO2 90%.    Awake, alert, oriented, in no distress  Able to follow some, but not all, simple commands but appears to be somewhat slow to respond  Well-nourished, in bed  No leg edema    Supple neck.    Mental status exam as above, conversant   Fund of knowledge unable to assess  Recent/remote memory unable to assess  Fair attention span  Pupils round reactive to light, 3-4 mm, (-) RAPD   Fundoscopic examination was attempted but fundus was not visualized bilaterally   Full EOMs intact, no nystagmus, no ptosis   V1 to V3 sensation is intact   No facial droop   Hearing grossly intact   No dysarthria  Good shoulder shrug bilaterally   Tongue is midline     Motor strength is at least antigravity on all extremities, tone/bulk normal   Reflexes 1+ on BUE, trace to absent on BLE (difficult to have patient cooperation, patient extending the knees), downgoing toes bilaterally   Sensation is intact to light touch, vibration on all 4 extremities   Finger to nose test intact bilaterally   I did not have her stand or walk           NIH Stroke Scale  1A. Level of Consciousness: Alert, Keenly Responsive  1B. Ask Month and Age: Both Questions Right  1C. Blink Eyes & Squeeze Hands: Performs Both Tasks  2. Best Gaze: Normal  3. Visual: No Visual Loss  4. Facial Palsy: Normal Symmetrical Movements  5A. Motor - Left Arm: No Drift  5B. Motor - Right Arm: No Drift  6A. Motor - Left Leg: No Drift  6B. Motor - Right Leg: No Drift  7. Limb Ataxia: Absent  8. Sensory Loss: Normal  9. Best Language: No Aphasia  10. Dysarthria: Normal  11. Extinction and Inattention: No Abnormality  NIH Stroke Scale: 0    ICH score 1  GCS 15      Relevant Results  I have personally reviewed the following:    Labs  Results for orders placed or performed during the hospital encounter of 08/05/25 (from " the past 24 hours)   ECG 12 lead   Result Value Ref Range    Ventricular Rate 90 BPM    Atrial Rate 85 BPM    AL Interval 119 ms    QRS Duration 134 ms    QT Interval 376 ms    QTC Calculation(Bazett) 460 ms    R Axis -18 degrees    T Axis -40 degrees    QRS Count 16 beats    Q Onset 251 ms    T Offset 439 ms    QTC Fredericia 430 ms   CBC and Auto Differential   Result Value Ref Range    WBC 7.2 4.4 - 11.3 x10*3/uL    nRBC 0.0 0.0 - 0.0 /100 WBCs    RBC 3.63 (L) 4.00 - 5.20 x10*6/uL    Hemoglobin 12.8 12.0 - 16.0 g/dL    Hematocrit 36.6 36.0 - 46.0 %     (H) 80 - 100 fL    MCH 35.3 (H) 26.0 - 34.0 pg    MCHC 35.0 32.0 - 36.0 g/dL    RDW 13.3 11.5 - 14.5 %    Platelets 161 150 - 450 x10*3/uL    Neutrophils % 75.2 40.0 - 80.0 %    Immature Granulocytes %, Automated 0.7 0.0 - 0.9 %    Lymphocytes % 13.8 13.0 - 44.0 %    Monocytes % 9.9 2.0 - 10.0 %    Eosinophils % 0.1 0.0 - 6.0 %    Basophils % 0.3 0.0 - 2.0 %    Neutrophils Absolute 5.40 1.60 - 5.50 x10*3/uL    Immature Granulocytes Absolute, Automated 0.05 0.00 - 0.50 x10*3/uL    Lymphocytes Absolute 0.99 0.80 - 3.00 x10*3/uL    Monocytes Absolute 0.71 0.05 - 0.80 x10*3/uL    Eosinophils Absolute 0.01 0.00 - 0.40 x10*3/uL    Basophils Absolute 0.02 0.00 - 0.10 x10*3/uL   Basic metabolic panel   Result Value Ref Range    Glucose 102 (H) 74 - 99 mg/dL    Sodium 135 (L) 136 - 145 mmol/L    Potassium 3.8 3.5 - 5.3 mmol/L    Chloride 101 98 - 107 mmol/L    Bicarbonate 23 21 - 32 mmol/L    Anion Gap 15 10 - 20 mmol/L    Urea Nitrogen 39 (H) 6 - 23 mg/dL    Creatinine 2.07 (H) 0.50 - 1.05 mg/dL    eGFR 22 (L) >60 mL/min/1.73m*2    Calcium 8.1 (L) 8.6 - 10.3 mg/dL   Blood Gas Venous Full Panel   Result Value Ref Range    POCT pH, Venous 7.40 7.33 - 7.43 pH    POCT pCO2, Venous 38 (L) 41 - 51 mm Hg    POCT pO2, Venous 51 (H) 35 - 45 mm Hg    POCT SO2, Venous 84 (H) 45 - 75 %    POCT Oxy Hemoglobin, Venous 81.7 (H) 45.0 - 75.0 %    POCT Hematocrit Calculated, Venous  40.0 36.0 - 46.0 %    POCT Sodium, Venous 132 (L) 136 - 145 mmol/L    POCT Potassium, Venous 3.9 3.5 - 5.3 mmol/L    POCT Chloride, Venous 102 98 - 107 mmol/L    POCT Ionized Calicum, Venous 1.00 (L) 1.10 - 1.33 mmol/L    POCT Glucose, Venous 103 (H) 74 - 99 mg/dL    POCT Lactate, Venous 1.3 0.4 - 2.0 mmol/L    POCT Base Excess, Venous -1.1 -2.0 - 3.0 mmol/L    POCT HCO3 Calculated, Venous 23.5 22.0 - 26.0 mmol/L    POCT Hemoglobin, Venous 13.2 12.0 - 16.0 g/dL    POCT Anion Gap, Venous 10.0 10.0 - 25.0 mmol/L    Patient Temperature 37.0 degrees Celsius    FiO2 21 %       Imaging results    CT head wo IV contrast  Result Date: 8/5/2025  Interpreted By:  Davian Odom, STUDY: CT HEAD WO IV CONTRAST;  8/5/2025 8:57 am   INDICATION: Signs/Symptoms:repeat.   COMPARISON: CT brain 11/27/2023 and 08/05/2025   ACCESSION NUMBER(S): [id number]   ORDERING CLINICIAN: IRAIS PALCAIOS   TECHNIQUE: Noncontrast axial CT scan of head was performed.   FINDINGS: Parenchyma: Stable 5 mm round hyperdense focus anterior right frontal lobe (series 8, image 51 and series 6, image 22), unchanged since the prior CT of the same day. No surrounding vasogenic edema or mass effect. There is no intracranial hemorrhage. The grey-white differentiation is intact. There is no mass effect or midline shift. Patchy supratentorial hypodensity, nonspecific, but likely secondary to moderate chronic microvascular ischemia.   CSF Spaces: Moderate generalized volume loss, with concordant ventricular enlargement.   Extra-Axial Fluid: There is no extraaxial fluid collection.   Calvarium: The calvarium is unremarkable.   Paranasal sinuses: Mild paranasal sinus mucosal thickening.   Mastoids: Clear.   Orbits: Bilateral lens replacements.   Soft tissues: Unremarkable.       1.  Stable appearance 5 mm round focus of hypoattenuation anterior right frontal lobe, without evidence of edema or mass effect. This likely represents an unchanged parenchymal  hemorrhage or a cavernoma. 2. Chronic microvascular ischemia and involutional changes.     Signed by: Davian Odom 8/5/2025 9:07 AM Dictation workstation:   [id number]    CT head wo IV contrast  Result Date: 8/5/2025  Interpreted By:  Finkelstein, Evan, STUDY: CT HEAD WO IV CONTRAST;  8/5/2025 4:13 am   INDICATION: Signs/Symptoms:ams.     COMPARISON: CT brain 11/27/2023   ACCESSION NUMBER(S): [id number]   ORDERING CLINICIAN: IRAIS PALACIOS   TECHNIQUE: Axial noncontrast CT images of the head with coronal and sagittal reconstructions.   FINDINGS: EXTRACRANIAL SOFT TISSUES: Unremarkable.   CALVARIUM: No depressed skull fracture. No destructive osseous lesion.   PARANASAL SINUSES/MASTOIDS: The visualized paranasal sinuses and mastoid air cells are aerated.   HEMORRHAGE: Punctate focus of increased density in the right frontal lobe best seen on axial image 31 of series 13 that was not definitively seen on prior imaging..   BRAIN PARENCHYMA: Gray-white matter interfaces are preserved. No mass effect or midline shift. There are nonspecific scattered white matter hypodensities.   VENTRICLES and EXTRA-AXIAL SPACES: Parenchymal atrophy with prominence of the ventricles and cortical sulci.   OTHER FINDINGS: There are calcifications within the cavernous carotids       Punctate focus of increased density in the right frontal lobe not definitively seen on prior imaging, which may represent a small focus of hemorrhage. Consider interval CT follow-up in 4 hours to assess stability. No mass effect or midline shift.   Nonspecific scattered white matter hypodensities favored to represent sequela of small vessel ischemia.       MACRO: Evan Finkelstein discussed the significance and urgency of this critical finding by EPIC secure chat with  IRAIS PALACIOS on 8/5/2025 at 4:41 am.  (**-RCF-**) Findings:  See findings.   .   Signed by: Evan Finkelstein 8/5/2025 4:41 AM Dictation workstation:   [id number]       No MRI brain  results found for the past 12 months      Assessment/Plan    1.  Abnormal CT scan of the brain  2.  Hyperdensity in the right frontal lobe  Nontraumatic--no known recent history of trauma  Head CT without contrast done showed hyperdensity in the right frontal lobe  Ddx: intraparenchymal hemorrhage (small) vs cavernoma vs other (including calcification from whatever cause)  Discussed findings with patient/daughter  Unclear significance of this finding, in relation to presenting behavioral changes    3.  Atrial fibrillation  4.  On anticoagulation  Because of above CT scan findings, anticoagulation has been held due to concern for bleeding  Daughter is apprehensive about stroke risk off anticoagulation    I discussed the possibility of doing MRI brain without contrast--hopefully this can shed more light as to whether there is hemosiderin here or if this is potentially nonspecific calcification.  Patient and daughter hesitant about doing an MRI, and wanted to discuss about it first amongst themselves.    5.  Acute encephalopathy  6.  UTI  Acute encephalopathy presumed to be related to UTI  Defer to primary team      Recommendations:  1.  Do MRI brain without contrast pending patient/family decision--they did not want me to order this at this time  2.  Continue supportive care  3.  Delirium precautions  4.  Will defer timing of anticoagulation resumption to neurosurgery, if able to resume  5.  If patient unable to resume anticoagulation for any reason, I also discussed the possibility of not anticoagulation stroke risk lowering strategies with patient/daughter (e.g. Watchman procedure) if she is a candidate    Discussed with patient/daughter.  Message sent to primary team.  Will follow peripherally pending MRI.  All questions answered. Please call with questions.    Guanaco Torres MD  8/5/2025  4:27 PM          [1] History reviewed. No pertinent past medical history.  [2] History reviewed. No pertinent surgical  history.  [3]   Social History  Tobacco Use    Smoking status: Never    Smokeless tobacco: Never   Substance Use Topics    Alcohol use: Never    Drug use: Never   [4]   Medications Prior to Admission   Medication Sig Dispense Refill Last Dose/Taking    Cipro 250 mg tablet Take 1 tablet every 12 hours by oral route for 5 days.   Taking    Macrobid 100 mg capsule Take 1 capsule every 12 hours by oral route for 7 days.   Taking    alum-mag hydroxide-simeth (Mylanta) 200-200-20 mg/5 mL oral suspension Take 20 mL by mouth 4 times a day as needed for indigestion or heartburn. (Patient not taking: Reported on 8/5/2025) 355 mL 0 Not Taking    apixaban (Eliquis) 2.5 mg tablet Take 1 tablet (2.5 mg) by mouth 2 times a day.       ascorbic acid (Vitamin C) 500 mg tablet Take 1 tablet (500 mg) by mouth once daily.       CALCIUM ORAL Take 1 tablet by mouth once daily.       cholecalciferol (Vitamin D3) 25 mcg (1,000 units) tablet Take 1 tablet (25 mcg) by mouth once daily.       cyanocobalamin (Vitamin B-12) 1,000 mcg tablet Take 1 tablet (1,000 mcg) by mouth once daily.       ferrous sulfate 325 mg (65 mg elemental) tablet Take 1 tablet by mouth once daily with breakfast.       levothyroxine (Synthroid, Levoxyl) 125 mcg tablet Take 1 tablet (125 mcg) by mouth early in the morning.. Take on an empty stomach at the same time each day, either 30 to 60 minutes prior to breakfast       lidocaine 4 % patch Place 1 patch over 12 hours on the skin once daily. Place on area of maximum pain. Remove & discard patch within 12 hours. (Patient not taking: Reported on 8/5/2025) 7 patch 0 Not Taking    losartan (Cozaar) 25 mg tablet Take 1 tablet (25 mg) by mouth once daily.       metoprolol succinate XL (Toprol-XL) 100 mg 24 hr tablet Take 1 tablet (100 mg) by mouth once daily. Do not crush or chew.       ondansetron (Zofran) 4 mg tablet Take 1 tablet (4 mg) by mouth every 8 hours if needed for nausea or vomiting. (Patient not taking: Reported  on 8/5/2025) 20 tablet 0 Not Taking   [5] [Held by provider] apixaban, 2.5 mg, oral, BID  ascorbic acid, 500 mg, oral, Daily  cholecalciferol, 25 mcg, oral, Daily  cyanocobalamin, 1,000 mcg, oral, Daily  [START ON 8/6/2025] ferrous sulfate, 1 tablet, oral, Daily with breakfast  levothyroxine, 125 mcg, oral, Daily  losartan, 25 mg, oral, Daily  metoprolol succinate XL, 100 mg, oral, Daily  [START ON 8/6/2025] pantoprazole, 40 mg, oral, Daily before breakfast   Or  [START ON 8/6/2025] pantoprazole, 40 mg, intravenous, Daily before breakfast  polyethylene glycol, 17 g, oral, Daily     [6] sodium chloride 0.9%, 75 mL/hr, Last Rate: 75 mL/hr (08/05/25 1614)     [7] PRN medications: bisacodyl, guaiFENesin, melatonin, ondansetron **OR** ondansetron

## 2025-08-05 NOTE — ED TRIAGE NOTES
Pt arrived to ED via squad for c/o AMS, EMS states that daughter called them for pt being non-cooperative. Ems states that daughter was going to bring pt into hospital herself but pt would not walk to the car with her. EMS states when they got there pt was fully cooperative with them and walked to the squad with them. EMS states that pt was very recently diagnosed with UTI. Denies CP and SOB.

## 2025-08-05 NOTE — PROGRESS NOTES
Emergency Medicine Transition of Care Note.    I received Kaitlin Sarkar in signout from Dr. Swartz.  Please see the previous ED provider note for all HPI, PE and MDM up to the time of signout at 0700. This is in addition to the primary record.    In brief Kaitlin Sarkar is an 89 y.o. female presenting for   Chief Complaint   Patient presents with    Altered Mental Status     At the time of signout we were awaiting: Repeat CTH    Diagnoses as of 08/05/25 1044   Delirium   Acute kidney injury   Urinary tract infection with hematuria, site unspecified   Intraparenchymal hemorrhage of brain (Multi)       Medical Decision Making  Patient was signed out to me from the outgoing team pending her repeat CT head.  CT head showed stability of that intraparenchymal hemorrhage.  I spoke with Dr. Pagan of neurosurgery who recommended no acute surgical intervention and that the patient can remain at [facility].  The patient was admitted to medicine to undergo continued management        Final diagnoses:   [R41.0] Delirium   [N17.9] Acute kidney injury   [N39.0, R31.9] Urinary tract infection with hematuria, site unspecified   [I61.9] Intraparenchymal hemorrhage of brain (Multi)           Procedure  Procedures    Edmond Masterson MD

## 2025-08-06 ENCOUNTER — APPOINTMENT (OUTPATIENT)
Dept: CARDIOLOGY | Facility: HOSPITAL | Age: OVER 89
End: 2025-08-06
Payer: MEDICARE

## 2025-08-06 LAB
ANION GAP SERPL CALC-SCNC: 14 MMOL/L (ref 10–20)
BNP SERPL-MCNC: 524 PG/ML (ref 0–99)
BUN SERPL-MCNC: 35 MG/DL (ref 6–23)
CALCIUM SERPL-MCNC: 7.6 MG/DL (ref 8.6–10.3)
CHLORIDE SERPL-SCNC: 104 MMOL/L (ref 98–107)
CHOLEST SERPL-MCNC: 116 MG/DL (ref 0–199)
CHOLESTEROL/HDL RATIO: 3.5
CO2 SERPL-SCNC: 21 MMOL/L (ref 21–32)
CREAT SERPL-MCNC: 1.75 MG/DL (ref 0.5–1.05)
EGFRCR SERPLBLD CKD-EPI 2021: 27 ML/MIN/1.73M*2
EJECTION FRACTION APICAL 4 CHAMBER: 53.2
EJECTION FRACTION: 55 %
ERYTHROCYTE [DISTWIDTH] IN BLOOD BY AUTOMATED COUNT: 13.5 % (ref 11.5–14.5)
EST. AVERAGE GLUCOSE BLD GHB EST-MCNC: 105 MG/DL
GLUCOSE SERPL-MCNC: 149 MG/DL (ref 74–99)
HBA1C MFR BLD: 5.3 % (ref ?–5.7)
HCT VFR BLD AUTO: 35.8 % (ref 36–46)
HDLC SERPL-MCNC: 33.6 MG/DL
HGB BLD-MCNC: 12.1 G/DL (ref 12–16)
HOLD SPECIMEN: NORMAL
LDLC SERPL CALC-MCNC: 70 MG/DL
LEFT ATRIUM VOLUME AREA LENGTH INDEX BSA: 42.8 ML/M2
LEFT VENTRICLE INTERNAL DIMENSION DIASTOLE: 4.32 CM (ref 3.5–6)
LEFT VENTRICULAR OUTFLOW TRACT DIAMETER: 1.88 CM
LV EJECTION FRACTION BIPLANE: 51 %
MCH RBC QN AUTO: 34.5 PG (ref 26–34)
MCHC RBC AUTO-ENTMCNC: 33.8 G/DL (ref 32–36)
MCV RBC AUTO: 102 FL (ref 80–100)
MITRAL VALVE E/A RATIO: 2.08
MITRAL VALVE E/E' RATIO: 14.43
NON HDL CHOLESTEROL: 82 MG/DL (ref 0–149)
NRBC BLD-RTO: 0 /100 WBCS (ref 0–0)
PLATELET # BLD AUTO: 157 X10*3/UL (ref 150–450)
POTASSIUM SERPL-SCNC: 4.6 MMOL/L (ref 3.5–5.3)
RBC # BLD AUTO: 3.51 X10*6/UL (ref 4–5.2)
RIGHT VENTRICLE FREE WALL PEAK S': 12.98 CM/S
RIGHT VENTRICLE PEAK SYSTOLIC PRESSURE: 53 MMHG
SODIUM SERPL-SCNC: 134 MMOL/L (ref 136–145)
TRICUSPID ANNULAR PLANE SYSTOLIC EXCURSION: 1.9 CM
TRIGL SERPL-MCNC: 60 MG/DL (ref 0–149)
VLDL: 12 MG/DL (ref 0–40)
WBC # BLD AUTO: 11.2 X10*3/UL (ref 4.4–11.3)

## 2025-08-06 PROCEDURE — 51702 INSERT TEMP BLADDER CATH: CPT

## 2025-08-06 PROCEDURE — 83880 ASSAY OF NATRIURETIC PEPTIDE: CPT | Performed by: NURSE PRACTITIONER

## 2025-08-06 PROCEDURE — 93306 TTE W/DOPPLER COMPLETE: CPT | Performed by: STUDENT IN AN ORGANIZED HEALTH CARE EDUCATION/TRAINING PROGRAM

## 2025-08-06 PROCEDURE — 80061 LIPID PANEL: CPT | Performed by: NURSE PRACTITIONER

## 2025-08-06 PROCEDURE — 99233 SBSQ HOSP IP/OBS HIGH 50: CPT | Performed by: INTERNAL MEDICINE

## 2025-08-06 PROCEDURE — 84443 ASSAY THYROID STIM HORMONE: CPT | Performed by: FAMILY MEDICINE

## 2025-08-06 PROCEDURE — 2060000001 HC INTERMEDIATE ICU ROOM DAILY

## 2025-08-06 PROCEDURE — 36415 COLL VENOUS BLD VENIPUNCTURE: CPT | Performed by: NURSE PRACTITIONER

## 2025-08-06 PROCEDURE — 97161 PT EVAL LOW COMPLEX 20 MIN: CPT | Mod: GP | Performed by: PHYSICAL THERAPIST

## 2025-08-06 PROCEDURE — 97116 GAIT TRAINING THERAPY: CPT | Mod: GP | Performed by: PHYSICAL THERAPIST

## 2025-08-06 PROCEDURE — 2500000002 HC RX 250 W HCPCS SELF ADMINISTERED DRUGS (ALT 637 FOR MEDICARE OP, ALT 636 FOR OP/ED): Performed by: NURSE PRACTITIONER

## 2025-08-06 PROCEDURE — 2500000004 HC RX 250 GENERAL PHARMACY W/ HCPCS (ALT 636 FOR OP/ED): Performed by: NURSE PRACTITIONER

## 2025-08-06 PROCEDURE — 99233 SBSQ HOSP IP/OBS HIGH 50: CPT | Performed by: NURSE PRACTITIONER

## 2025-08-06 PROCEDURE — 85027 COMPLETE CBC AUTOMATED: CPT | Performed by: NURSE PRACTITIONER

## 2025-08-06 PROCEDURE — 80048 BASIC METABOLIC PNL TOTAL CA: CPT | Performed by: NURSE PRACTITIONER

## 2025-08-06 PROCEDURE — 93306 TTE W/DOPPLER COMPLETE: CPT

## 2025-08-06 PROCEDURE — 2500000001 HC RX 250 WO HCPCS SELF ADMINISTERED DRUGS (ALT 637 FOR MEDICARE OP): Performed by: NURSE PRACTITIONER

## 2025-08-06 RX ORDER — ATORVASTATIN CALCIUM 40 MG/1
40 TABLET, FILM COATED ORAL NIGHTLY
Status: DISPENSED | OUTPATIENT
Start: 2025-08-06

## 2025-08-06 RX ADMIN — OXYCODONE HYDROCHLORIDE AND ACETAMINOPHEN 500 MG: 500 TABLET ORAL at 10:24

## 2025-08-06 RX ADMIN — Medication 1000 MCG: at 10:24

## 2025-08-06 RX ADMIN — METOPROLOL SUCCINATE 100 MG: 50 TABLET, EXTENDED RELEASE ORAL at 10:24

## 2025-08-06 RX ADMIN — FERROUS SULFATE TAB 325 MG (65 MG ELEMENTAL FE) 1 TABLET: 325 (65 FE) TAB at 10:24

## 2025-08-06 RX ADMIN — Medication 25 MCG: at 10:24

## 2025-08-06 RX ADMIN — ATORVASTATIN CALCIUM 40 MG: 40 TABLET, FILM COATED ORAL at 20:53

## 2025-08-06 RX ADMIN — LOSARTAN POTASSIUM 25 MG: 25 TABLET, FILM COATED ORAL at 10:24

## 2025-08-06 RX ADMIN — PANTOPRAZOLE SODIUM 40 MG: 40 INJECTION, POWDER, FOR SOLUTION INTRAVENOUS at 06:12

## 2025-08-06 ASSESSMENT — COGNITIVE AND FUNCTIONAL STATUS - GENERAL
WALKING IN HOSPITAL ROOM: TOTAL
DRESSING REGULAR UPPER BODY CLOTHING: A LOT
HELP NEEDED FOR BATHING: A LOT
MOBILITY SCORE: 8
TURNING FROM BACK TO SIDE WHILE IN FLAT BAD: A LOT
DAILY ACTIVITIY SCORE: 12
CLIMB 3 TO 5 STEPS WITH RAILING: TOTAL
CLIMB 3 TO 5 STEPS WITH RAILING: A LOT
MOVING FROM LYING ON BACK TO SITTING ON SIDE OF FLAT BED WITH BEDRAILS: A LOT
MOVING TO AND FROM BED TO CHAIR: A LOT
WALKING IN HOSPITAL ROOM: A LOT
TURNING FROM BACK TO SIDE WHILE IN FLAT BAD: A LITTLE
PERSONAL GROOMING: A LOT
MOBILITY SCORE: 15
MOVING TO AND FROM BED TO CHAIR: TOTAL
TOILETING: A LOT
STANDING UP FROM CHAIR USING ARMS: A LOT
STANDING UP FROM CHAIR USING ARMS: TOTAL
EATING MEALS: A LOT
DRESSING REGULAR LOWER BODY CLOTHING: A LOT

## 2025-08-06 ASSESSMENT — PAIN SCALES - GENERAL
PAINLEVEL_OUTOF10: 0 - NO PAIN
PAINLEVEL_OUTOF10: 0 - NO PAIN

## 2025-08-06 ASSESSMENT — PAIN - FUNCTIONAL ASSESSMENT
PAIN_FUNCTIONAL_ASSESSMENT: 0-10
PAIN_FUNCTIONAL_ASSESSMENT: 0-10

## 2025-08-06 NOTE — PROGRESS NOTES
Occupational Therapy                 Therapy Communication Note    Patient Name: Kaitlin Sarkar  MRN: [id number]  Department: [facility]  NONV1  Room: 2028/2028-A  Today's Date: 8/6/2025     Discipline: Occupational Therapy    Missed Visit: OT Missed Visit: Yes     Missed Visit Reason: Missed Visit Reason: Patient in a medical procedure (OT eval attempted. pt in neurology consult at this time and then is going for US heart. not able to participate in eval right now.')    Missed Time: Attempt    Comment:

## 2025-08-06 NOTE — CARE PLAN
The patient's goals for the shift include get rest and stay informed.    The clinical goals for the shift include pt will remain free from injury throughout shift

## 2025-08-06 NOTE — CARE PLAN
The patient's goals for the shift include      The clinical goals for the shift include pt will remain free from injury throughout shift      Problem: Skin  Goal: Decreased wound size/increased tissue granulation at next dressing change  Outcome: Not Progressing  Flowsheets (Taken 8/5/2025 2337)  Decreased wound size/increased tissue granulation at next dressing change: Promote sleep for wound healing  Goal: Participates in plan/prevention/treatment measures  Outcome: Not Progressing  Flowsheets (Taken 8/5/2025 2337)  Participates in plan/prevention/treatment measures: Elevate heels  Goal: Prevent/manage excess moisture  Outcome: Not Progressing  Flowsheets (Taken 8/5/2025 2337)  Prevent/manage excess moisture: Cleanse incontinence/protect with barrier cream  Goal: Prevent/minimize sheer/friction injuries  Outcome: Not Progressing  Flowsheets (Taken 8/5/2025 2337)  Prevent/minimize sheer/friction injuries: Increase activity/out of bed for meals  Goal: Promote/optimize nutrition  Outcome: Not Progressing  Flowsheets (Taken 8/5/2025 2337)  Promote/optimize nutrition: Assist with feeding  Goal: Promote skin healing  Outcome: Not Progressing  Flowsheets (Taken 8/5/2025 2337)  Promote skin healing: Protective dressings over bony prominences     Problem: Pain - Adult  Goal: Verbalizes/displays adequate comfort level or baseline comfort level  Outcome: Not Progressing     Problem: Safety - Adult  Goal: Free from fall injury  Outcome: Not Progressing     Problem: Discharge Planning  Goal: Discharge to home or other facility with appropriate resources  Outcome: Not Progressing     Problem: Chronic Conditions and Co-morbidities  Goal: Patient's chronic conditions and co-morbidity symptoms are monitored and maintained or improved  Outcome: Not Progressing     Problem: Nutrition  Goal: Nutrient intake appropriate for maintaining nutritional needs  Outcome: Not Progressing       Problem: Skin  Goal: Decreased wound  size/increased tissue granulation at next dressing change  Outcome: Not Progressing  Flowsheets (Taken 8/5/2025 2337)  Decreased wound size/increased tissue granulation at next dressing change: Promote sleep for wound healing  Goal: Participates in plan/prevention/treatment measures  Outcome: Not Progressing  Flowsheets (Taken 8/5/2025 2337)  Participates in plan/prevention/treatment measures: Elevate heels  Goal: Prevent/manage excess moisture  Outcome: Not Progressing  Flowsheets (Taken 8/5/2025 2337)  Prevent/manage excess moisture: Cleanse incontinence/protect with barrier cream  Goal: Prevent/minimize sheer/friction injuries  Outcome: Not Progressing  Flowsheets (Taken 8/5/2025 2337)  Prevent/minimize sheer/friction injuries: Increase activity/out of bed for meals  Goal: Promote/optimize nutrition  Outcome: Not Progressing  Flowsheets (Taken 8/5/2025 2337)  Promote/optimize nutrition: Assist with feeding  Goal: Promote skin healing  Outcome: Not Progressing  Flowsheets (Taken 8/5/2025 2337)  Promote skin healing: Protective dressings over bony prominences     Problem: Pain - Adult  Goal: Verbalizes/displays adequate comfort level or baseline comfort level  Outcome: Not Progressing     Problem: Safety - Adult  Goal: Free from fall injury  Outcome: Not Progressing     Problem: Discharge Planning  Goal: Discharge to home or other facility with appropriate resources  Outcome: Not Progressing     Problem: Chronic Conditions and Co-morbidities  Goal: Patient's chronic conditions and co-morbidity symptoms are monitored and maintained or improved  Outcome: Not Progressing     Problem: Nutrition  Goal: Nutrient intake appropriate for maintaining nutritional needs  Outcome: Not Progressing

## 2025-08-06 NOTE — PROGRESS NOTES
Kaitlin Sarkar is a 89 y.o. female on day 1 of admission presenting with Delirium.      Subjective   Kaitlin Sarkar is a 89 y.o. female with PMHx s/f hypertension, atrial fibrillation, memory loss presenting with confusion.  Patient had increased confusion at home was doing things incorrectly stating things that were wrong worse than normal memory.  She had not been having any fevers or chills no diarrhea no vomiting.  She was probably not eating and drinking as much is normal.  She was taken to a [facility] found to have urinary tract infection started on some antibiotic patient continued to get worse.  In the emergency department chemistry panel showed an elevated creatinine there was no leukocytosis CT scan of the head was done there was findings of a 5 mm right anterior frontal lobe density possibly consistent with intraparenchymal hemorrhage.  A repeat CT of the head was done in an interval of about 4 hours.  The density appeared to be stable the ED provider discussed the case with Dr. Pagan of neurosurgery who recommended no acute surgical intervention.  ED provider gave patient Rocephin and normal saline Case was discussed between myself and the ED provider patient will be admitted for further evaluation and management.     ED Course:   Vitals on presentation: T 36.3 °C (97.3 °F)  HR (!) 107  BP (!) 170/98  RR 16  O2 98 % None (Room air)  Labs:   CBC with WBC 7.2, Hgb 12.8, Plts 161.   CMP with glucose 102, Na 135, K 3.8, BUN 39, sCr 2.07, alk phos No results found for requested labs within last 365 days., ALT No results found for requested labs within last 365 days., AST No results found for requested labs within last 365 days., bilirubin No results found for requested labs within last 365 days.. Magnesium No results found for requested labs within last 365 days..   BNP No results found for requested labs within last 365 days.. Trop No results found for requested labs within last 365 days..   Lactate No  results found for requested labs within last 365 days.  EKG: Not available for review  Imaging - agree with radiology interpretation(s):   Interventions: Normal saline IV Rocephin     8/6: Patient was seen and examined.  Patient is drowsy but arousable.  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  Likely embolic.  Anticoagulation on hold for another 7 days to prevent hemorrhagic transformation.  Continue high intensity statin.  Neurologist recommendations appreciated.  Will get vitamin B12 TSH and ammonia level in a.m..  Repeat CBC and BMP in a.m. I had an extensive conversation with the patient's daughter at bedside who would like to explore the option of hospice.   for hospice consulted.    Objective     Last Recorded Vitals  /84 (BP Location: Right arm, Patient Position: Lying)   Pulse 110   Temp 37 °C (98.6 °F) (Temporal)   Resp 18   Wt 51.7 kg (113 lb 15.7 oz)   SpO2 95%   Intake/Output last 3 Shifts:    Intake/Output Summary (Last 24 hours) at 8/6/2025 1638  Last data filed at 8/6/2025 1502  Gross per 24 hour   Intake 1338.75 ml   Output 1350 ml   Net -11.25 ml       Admission Weight  Weight: 52.5 kg (115 lb 11.2 oz) (08/05/25 0155)    Daily Weight  08/05/25 : 51.7 kg (113 lb 15.7 oz)    Image Results  MR brain wo IV contrast, MR angio head wo IV contrast, MR angio neck wo IV contrast  Addendum: Interpreted By:  Shad Bryan,    ADDENDUM:   A subcentimeter focus of diffusion restriction is also noted in the   expected location of the right motor strip at the vertex. Finding is   consistent with acute infarction in the distribution of the right   middle cerebral artery.        Signed by: Shad Bryan 8/6/2025 3:35 PM        -------- ORIGINAL REPORT --------   Dictation workstation:   [id number]     Interpreted By:  Shad Bryan,    ADDENDUM:   Shad Bryan discussed the significance and urgency of this   critical  finding by EPIC secure chat with  MIKAYLA WONG on 8/6/2025 at 12:08 pm.  (**-RCF-**) Findings:  See   findings.             Signed by: Shad Bryan 8/6/2025 12:08 PM        -------- ORIGINAL REPORT --------   Dictation workstation:   [id number]  Narrative: Interpreted By:  Shad Bryan,   STUDY:  MR ANGIO HEAD WO IV CONTRAST; MR BRAIN WO IV CONTRAST; MR ANGIO NECK  WO IV CONTRAST;  8/5/2025 10:06 pm      INDICATION:  Signs/Symptoms:stroke; Signs/Symptoms:abnormality frontal lobe.      COMPARISON:  CT August 5th.      ACCESSION NUMBER(S):  [id number]; [id number]; [id number]      ORDERING CLINICIAN:  MIKAYLA WONG      TECHNIQUE:  The brain was studied in the sagittal, axial and coronal planes  utilizing FLAIR, T1 and T2 weighted images.      FINDINGS:  There is a normal-size ventricular system.  There is no evidence of  intracranial mass or extra-axial collection.  The skull base,  paranasal sinuses and orbital structures are unremarkable. Diffusion  weighted images and associated ADC maps of the brain demonstrate  patchy foci of diffusion restriction with minimal abnormal ADC  calculation in the left cerebral hemisphere in the distribution of  the posterior division of the left middle cerebral artery. No  measurable mass effect. Gradient echo T2 weighted images demonstrate  multiple foci of hemosiderin deposition predominantly in the right  cerebral hemisphere including hyperdense focus identified on the  accompanying CT examination.      Magnetic resonance angiography  Axial 3-D time-of-flight acquisition was performed and multiplanar  reconstructions were made. MRA at the carotid bifurcations reveals  normal anatomic configuration.  The vertebral arteries are normal.  There is no evidence of narrowing or stenosis. Magnetic resonance  angiography of the intracranial vessels was performed utilizing 3-D  time-of-flight imaging with 3-D reconstruction. There is a  paucity of  distal branches of the right middle cerebral artery compared to the  left. Finding may be artifactual; however, slow flow or arrested flow  in the distal branches of the right middle cerebral artery not  excluded. There is no evidence of aneurysm or vascular malformation.      Impression: Acute/subacute infarction in the distribution of the posterior  division of the left middle cerebral artery. No associated hemorrhage  or mass effect Abundant foci of hemosiderin deposition especially in  the right hemisphere but also within the left hemisphere and midbrain  of uncertain cause and significance. Consider amyloid angiopathy MRA  of the carotid bifurcations is within normal limits. There is a  paucity of distal branches of the right middle cerebral artery which  could be artifactual There is no evidence of intracranial aneurysm or  vascular malformation.      MACRO:  none      Signed by: Shad Bryan 8/6/2025 11:30 AM  Dictation workstation:   [id number]      Physical Exam  Vitals:    08/06/25 1458   BP: 153/84   Pulse: 110   Resp: 18   Temp: 37 °C (98.6 °F)   SpO2: 95%     Constitutional: Pleasant and cooperative. Laying in bed in no acute distress. Conversant.   Skin: Warm and dry; no obvious lesions, rashes, pallor, or jaundice.   Eyes: EOMI. Anicteric sclera.   ENT: Mucous membranes moist; no obvious injury or deformity appreciated.   Head and Neck: Normocephalic, atraumatic. ROM preserved. Trachea midline. No appreciable JVD.   Respiratory: Nonlabored on RA. Lungs clear to auscultation bilaterally without obvious adventitious sounds. Chest rise is equal.  Cardiovascular: RRR. No gross murmur, gallop, or rub. Extremities are warm and well-perfused with good capillary refill (< 3 seconds). No chest wall tenderness.   GI: Abdomen soft, nontender, nondistended. No obvious organomegaly appreciated. Bowel sounds are present.  : No CVA tenderness.   MSK: No gross abnormalities appreciated. No  limitations to AROM/PROM appreciated.   Extremities: No cyanosis, edema, or clubbing evident. Neurovascularly intact.   Neuro: Oriented to self only CN 2-12 grossly intact. Able to respond to questions  clearly. No acute focal neurologic deficits appreciated.  Psych: Appropriate mood and behavior  Relevant Results             This patient currently has cardiac telemetry ordered; if you would like to modify or discontinue the telemetry order, click here to go to the orders activity to modify/discontinue the order.      This patient has a urinary catheter   Reason for the urinary catheter remaining today? urinary retention/bladder outlet obstruction, acute or chronic          Assessment & Plan  Delirium      Acute cerebral infarction  Admitted under telemetry  Anticoagulation on hold in the setting of possible intracranial hemorrhage and to prevent hemorrhagic transformation  Continue atorvastatin for now  Hold aspirin  Echocardiogram ordered and pending  Continue permissive hypertension  PT/OT  Q4 hourly neurochecks  Neurologist recommendations appreciated    Multiple hemosiderin foci for?  Intracranial hemorrhage  Right frontal lobe,  Possible area of hemorrhage  Continue to hold anticoagulation for another 7 days  Neurologist recommendations appreciated  ED d/w neurosurgery felt to be stable  PT/OT eval  Monitor neuro status  Follow up with neuro surgery as outpt      Acute encephalopathy  Possibly multifactorial from CVA, intracranial hemorrhage and MITUL  Anticoagulation on hold  Less likely due to UTI  Will discontinue antibiotics for now  CAD ammonia vitamin B12 and TSH in a.m.  Trend BMP daily    Urinary retention and right hydronephrosis  Continuous Gutierrez drainage  Urologist consulted      Atrial fibrillation  Currently rate controlled  Anticoagulation on hold due to intracranial hemorrhage and stroke prophy prevent hemorrhagic transformation       HTN  Continue home medications           35 minutes in the  follow-up management of this patient today         Camille Ovalle MD

## 2025-08-06 NOTE — PROGRESS NOTES
[facility] Progress Note    Kaitlin Sarkar is a 89 y.o. female on day 1 of admission presenting with Delirium.    SUBJECTIVE     Pt seen this afternoon for first time by myself, initially seen by Dr. Torres. Patient is alert but confused and hard of hearing. Sitter at bedside. Daughter also at bedside. Per daughter, patient has been c/o back pain due to bed. No other new symptoms or issues.     OBJECTIVE     Vitals:    08/06/25 0520 08/06/25 0716 08/06/25 1059 08/06/25 1458   BP: (!) 160/93 (!) 158/95 (!) 166/115 153/84   BP Location: Left arm Right arm Right arm Right arm   Patient Position: Lying Lying Lying Lying   Pulse: (!) 115 96 (!) 116 110   Resp: 20 18 16 18   Temp: 37.1 °C (98.7 °F) 36.6 °C (97.9 °F) 36.6 °C (97.9 °F) 37 °C (98.6 °F)   TempSrc: Temporal Temporal Temporal Temporal   SpO2: 95% 95% 95% 95%   Weight:       Height:             Physical Exam  Vitals reviewed.   Constitutional:       General: She is awake.     Neurological:      Mental Status: She is alert.     Psychiatric:         Speech: Speech normal.       Neurological Exam  Mental Status  Awake and alert. Oriented only to person. Oriented to self and daughter  Knows she is in the hospital.   Unable to answer other orientation questions. . Speech is normal.    Cranial Nerves  CN II-XII grossly intact other than hard of hearing.    Motor  Normal muscle bulk throughout. No fasciculations present. Normal muscle tone. No abnormal involuntary movements.  Some difficulty following commands due to hearing and mentation  Moves RUE>LUE  Generally weak at 4/5 throughout.    Sensory  Sensation is seemingly intact in all extremities to light touch, some limitation to exam based on mentation.    Coordination    Unable to test due to mentation.    Gait    Did not test, fall risk.      Home Medications:  Prior to Admission Medications   Prescriptions Last Dose Informant Patient Reported? Taking?   CALCIUM ORAL   Yes No   Sig: Take 1 tablet by mouth once  daily.   Cipro 250 mg tablet   Yes Yes   Sig: Take 1 tablet every 12 hours by oral route for 5 days.   Macrobid 100 mg capsule   Yes Yes   Sig: Take 1 capsule every 12 hours by oral route for 7 days.   alum-mag hydroxide-simeth (Mylanta) 200-200-20 mg/5 mL oral suspension Not Taking  No No   Sig: Take 20 mL by mouth 4 times a day as needed for indigestion or heartburn.   Patient not taking: Reported on 8/5/2025   apixaban (Eliquis) 2.5 mg tablet   Yes No   Sig: Take 1 tablet (2.5 mg) by mouth 2 times a day.   ascorbic acid (Vitamin C) 500 mg tablet   Yes No   Sig: Take 1 tablet (500 mg) by mouth once daily.   cholecalciferol (Vitamin D3) 25 mcg (1,000 units) tablet   Yes No   Sig: Take 1 tablet (25 mcg) by mouth once daily.   cyanocobalamin (Vitamin B-12) 1,000 mcg tablet   Yes No   Sig: Take 1 tablet (1,000 mcg) by mouth once daily.   ferrous sulfate 325 mg (65 mg elemental) tablet   Yes No   Sig: Take 1 tablet by mouth once daily with breakfast.   levothyroxine (Synthroid, Levoxyl) 125 mcg tablet   Yes No   Sig: Take 1 tablet (125 mcg) by mouth early in the morning.. Take on an empty stomach at the same time each day, either 30 to 60 minutes prior to breakfast   lidocaine 4 % patch Not Taking  No No   Sig: Place 1 patch over 12 hours on the skin once daily. Place on area of maximum pain. Remove & discard patch within 12 hours.   Patient not taking: Reported on 8/5/2025   losartan (Cozaar) 25 mg tablet   Yes No   Sig: Take 1 tablet (25 mg) by mouth once daily.   metoprolol succinate XL (Toprol-XL) 100 mg 24 hr tablet   Yes No   Sig: Take 1 tablet (100 mg) by mouth once daily. Do not crush or chew.   ondansetron (Zofran) 4 mg tablet Not Taking  No No   Sig: Take 1 tablet (4 mg) by mouth every 8 hours if needed for nausea or vomiting.   Patient not taking: Reported on 8/5/2025      Facility-Administered Medications: None        Current Inpatient Medications:  Scheduled medications  Scheduled  Medications[1]  Continuous medications  Continuous Medications[2]  PRN medications  PRN Medications[3]     Labs:  Lab Results   Component Value Date    WBC 11.2 08/06/2025    RBC 3.51 (L) 08/06/2025    HGB 12.1 08/06/2025    HCT 35.8 (L) 08/06/2025     08/06/2025     (L) 08/06/2025    K 4.6 08/06/2025     08/06/2025    BUN 35 (H) 08/06/2025    CREATININE 1.75 (H) 08/06/2025    EGFR 27 (L) 08/06/2025    CALCIUM 7.6 (L) 08/06/2025    ALKPHOS 63 11/27/2023    AST 28 11/27/2023    ALT 26 11/27/2023    MG 2.30 11/30/2023    LDLCALC 70 08/06/2025    CHOL 116 08/06/2025    HDL 33.6 08/06/2025    TRIG 60 08/06/2025    TSH 0.94 11/28/2023       Radiology:  Below CT and MRI images and reports have been personally reviewed in PACS, agree with interpretations.    MR brain wo IV contrast 08/05/2025  MR angio head wo IV contrast 08/05/2025  MR angio neck wo IV contrast 08/05/2025    Addendum 8/6/2025 12:08 PM  Interpreted By:  Shad Bryan,  ADDENDUM:  Shad Bryan discussed the significance and urgency of this  critical finding by EPIC secure chat with  MIKAYLA SERNA; JULIA WONG on 8/6/2025 at 12:08 pm.  (**-RCF-**) Findings:  See  findings.      Signed by: Shad Bryan 8/6/2025 12:08 PM    -------- ORIGINAL REPORT --------  Dictation workstation:   [id number]    Narrative  Interpreted By:  Shad Bryan,  STUDY:  MR ANGIO HEAD WO IV CONTRAST; MR BRAIN WO IV CONTRAST; MR ANGIO NECK  WO IV CONTRAST;  8/5/2025 10:06 pm    INDICATION:  Signs/Symptoms:stroke; Signs/Symptoms:abnormality frontal lobe.    COMPARISON:  CT August 5th.    ACCESSION NUMBER(S):  [id number]; [id number]; [id number]    ORDERING CLINICIAN:  MIKAYLA SERNA; JULIA WONG    TECHNIQUE:  The brain was studied in the sagittal, axial and coronal planes  utilizing FLAIR, T1 and T2 weighted images.    FINDINGS:  There is a normal-size ventricular system.  There is no evidence of  intracranial mass or extra-axial  collection.  The skull base,  paranasal sinuses and orbital structures are unremarkable. Diffusion  weighted images and associated ADC maps of the brain demonstrate  patchy foci of diffusion restriction with minimal abnormal ADC  calculation in the left cerebral hemisphere in the distribution of  the posterior division of the left middle cerebral artery. No  measurable mass effect. Gradient echo T2 weighted images demonstrate  multiple foci of hemosiderin deposition predominantly in the right  cerebral hemisphere including hyperdense focus identified on the  accompanying CT examination.    Magnetic resonance angiography  Axial 3-D time-of-flight acquisition was performed and multiplanar  reconstructions were made. MRA at the carotid bifurcations reveals  normal anatomic configuration.  The vertebral arteries are normal.  There is no evidence of narrowing or stenosis. Magnetic resonance  angiography of the intracranial vessels was performed utilizing 3-D  time-of-flight imaging with 3-D reconstruction. There is a paucity of  distal branches of the right middle cerebral artery compared to the  left. Finding may be artifactual; however, slow flow or arrested flow  in the distal branches of the right middle cerebral artery not  excluded. There is no evidence of aneurysm or vascular malformation.    Impression  Acute/subacute infarction in the distribution of the posterior  division of the left middle cerebral artery. No associated hemorrhage  or mass effect Abundant foci of hemosiderin deposition especially in  the right hemisphere but also within the left hemisphere and midbrain  of uncertain cause and significance. Consider amyloid angiopathy MRA  of the carotid bifurcations is within normal limits. There is a  paucity of distal branches of the right middle cerebral artery which  could be artifactual There is no evidence of intracranial aneurysm or  vascular malformation.    MACRO:  none    Signed by: Shad Bryan  8/6/2025 11:30 AM  Dictation workstation:   [id number]      CT head wo IV contrast 08/05/2025    Narrative  Interpreted By:  Davian Odom,  STUDY:  CT HEAD WO IV CONTRAST;  8/5/2025 8:57 am    INDICATION:  Signs/Symptoms:repeat.    COMPARISON:  CT brain 11/27/2023 and 08/05/2025    ACCESSION NUMBER(S):  [id number]    ORDERING CLINICIAN:  IRAIS PALACIOS    TECHNIQUE:  Noncontrast axial CT scan of head was performed.    FINDINGS:  Parenchyma: Stable 5 mm round hyperdense focus anterior right frontal  lobe (series 8, image 51 and series 6, image 22), unchanged since the  prior CT of the same day. No surrounding vasogenic edema or mass  effect. There is no intracranial hemorrhage. The grey-white  differentiation is intact. There is no mass effect or midline shift.  Patchy supratentorial hypodensity, nonspecific, but likely secondary  to moderate chronic microvascular ischemia.    CSF Spaces: Moderate generalized volume loss, with concordant  ventricular enlargement.    Extra-Axial Fluid: There is no extraaxial fluid collection.    Calvarium: The calvarium is unremarkable.    Paranasal sinuses: Mild paranasal sinus mucosal thickening.    Mastoids: Clear.    Orbits: Bilateral lens replacements.    Soft tissues: Unremarkable.    Impression  1.  Stable appearance 5 mm round focus of hypoattenuation anterior  right frontal lobe, without evidence of edema or mass effect. This  likely represents an unchanged parenchymal hemorrhage or a cavernoma.  2. Chronic microvascular ischemia and involutional changes.      Signed by: Davian Odom 8/5/2025 9:07 AM  Dictation workstation:   [id number]      CT head wo IV contrast 08/05/2025    Narrative  Interpreted By:  Finkelstein, Evan,  STUDY:  CT HEAD WO IV CONTRAST;  8/5/2025 4:13 am    INDICATION:  Signs/Symptoms:ams.      COMPARISON:  CT brain 11/27/2023    ACCESSION NUMBER(S):  [id number]    ORDERING CLINICIAN:  IRAIS PALACIOS    TECHNIQUE:  Axial noncontrast CT  images of the head with coronal and sagittal  reconstructions.    FINDINGS:  EXTRACRANIAL SOFT TISSUES: Unremarkable.    CALVARIUM: No depressed skull fracture. No destructive osseous lesion.    PARANASAL SINUSES/MASTOIDS: The visualized paranasal sinuses and  mastoid air cells are aerated.    HEMORRHAGE: Punctate focus of increased density in the right frontal  lobe best seen on axial image 31 of series 13 that was not  definitively seen on prior imaging..    BRAIN PARENCHYMA: Gray-white matter interfaces are preserved. No mass  effect or midline shift. There are nonspecific scattered white matter  hypodensities.    VENTRICLES and EXTRA-AXIAL SPACES: Parenchymal atrophy with  prominence of the ventricles and cortical sulci.    OTHER FINDINGS: There are calcifications within the cavernous carotids    Impression  Punctate focus of increased density in the right frontal lobe not  definitively seen on prior imaging, which may represent a small focus  of hemorrhage. Consider interval CT follow-up in 4 hours to assess  stability. No mass effect or midline shift.    Nonspecific scattered white matter hypodensities favored to represent  sequela of small vessel ischemia.        MACRO:  Evan Finkelstein discussed the significance and urgency of this  critical finding by EPIC secure chat with  IRAIS PALACIOS on  8/5/2025 at 4:41 am.  (**-RCF-**) Findings:  See findings.    .    Signed by: Evan Finkelstein 8/5/2025 4:41 AM  Dictation workstation:   [id number]       NIHSS:   NIH Stroke Scale:     Date/Time of Assessment: 8/6/2025 2:30 PM    1A. Level of Consciousness:  Alert (keenly responsive) (0)    1B. Ask Month and Age:  0 questions right (+2)    1C. Blink Eyes & Squeeze Hands:  Performs 1 task (+1)    2. Best Gaze:  Normal (0)    3. Visual:  No visual loss (0)    4. Facial Palsy:  Normal symmetry (0)    5A. Motor - Left Arm:  Drift (+1)    5B. Motor - Right Arm:  No drift (0)    6A. Motor - Left Leg:  Drift (+1)     6B. Motor - Right Leg:  Drift (+1)    7. Limb Ataxia:  No ataxia (0)    8. Sensory Loss:  Normal (no sensory loss) (0)    9. Best Language:  Mild-moderate aphasia (+1)    10. Dysarthia:  Normal (0)    11. Extinction and Inattention:  No abnormality (0)    NIH Stroke Scale:  7         Clair Coma Scale  Best Eye Response: Spontaneous  Best Verbal Response: Confused  Best Motor Response: Follows commands  Clair Coma Scale Score: 14     ASSESSMENT/PLAN     This patient currently has cardiac telemetry ordered; if you would like to modify or discontinue the telemetry order, click here to go to the orders activity to modify/discontinue the order.  Assessment & Plan  Delirium      IMPRESSION:  Kaitlin Sarkar is a 89 y.o. female with history of HTN, afib, and memory loss presented for behavioral changes and altered mental status since 8/2, fluctuating. Dx with UTI, started on abx, then became confused, next day daughter called urgent care back who changed abx rx. Pt then became confused again and off balance prompting ED visit. HCT with concerns for blood so eliquis was held. Discussed with neurosurgery who said patient could stay here for continued monitoring.   HCT - punctate increased density in R frontal lobe, ? Hemorrhage.   Repeat HCT - stable  MRI brain - acute infarct of L MCA, no hemorrhage or mass effect. Abundant foci of hemosiderin in R>L hemisphere and midbrain, uncertain etiology, ? Amyloid angiopathy. I did personally review images and discuss with Dr. Torres, there is also diffusion restriction noted to R cerebral hemisphere concerning for bilateral strokes. I did call and discuss with [facility] to see if radiologist can re-look at images and possibly addend the report.   MRA head/neck - no significant stenosis or LVO. No aneurysm.   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   Strokes are showing on FLAIR already so not hyperacute (prior to eliquis was held here)  Acute encephalopathy  Suspect multifactorial in setting of infection, metabolic abnormality and B cerebral strokes.   Daughter asking if patient will get back to normal, explained that this will take time to tell with multiple issues.   Hemosiderin, multifocal on MRI  No correlation with acute diffusion restriction  Suspect old areas of hemosiderin/calcification  ? Amyloid angiopathy vs other  Discussed with daughter about risk of further bleeding with continue anticoagulation use  UTI  MITUL, improving  Hx of afib  On anticoagulation    RECOMMENDATIONS:  Continue supportive care  Delirium precautions  From stroke standpoint, continue to hold anticoagulation x 7-10 days due to risk of hemorrhagic transformation. Defer holding beyond that to neurosurgery.   Continue to optimize metabolic situation as able  Continue to treat infectious processes per primary team  Continue PT/OT  A1C pending  TTE pending results.   Started on HI statin daily  Continue to monitor and manage vascular risk factors.   Permissive HTN x 24 more hours.     Discussed with patient and daughter, all questions answered  Case discussed and reviewed with Dr. Torres  Discussed with Dr. Ovalle.   Will follow peripherally for results.     I personally spent 55 minutes today, exclusive of procedures, providing care for this patient, including preparation, face to face time, documentation and other services such as review of medical records, diagnostic result, patient education, counseling, coordination of care as specified in the encounter.    Nicole Magallanes, SHRUTI-CNP           [1] [Held by provider] apixaban, 2.5 mg, oral, BID  ascorbic acid, 500 mg, oral, Daily  atorvastatin, 40 mg, oral, Nightly  cholecalciferol, 25 mcg,  oral, Daily  cyanocobalamin, 1,000 mcg, oral, Daily  ferrous sulfate, 1 tablet, oral, Daily with breakfast  levothyroxine, 125 mcg, oral, Daily  losartan, 25 mg, oral, Daily  metoprolol succinate XL, 100 mg, oral, Daily  pantoprazole, 40 mg, oral, Daily before breakfast   Or  pantoprazole, 40 mg, intravenous, Daily before breakfast  polyethylene glycol, 17 g, oral, Daily    [2]    [3] PRN medications: bisacodyl, guaiFENesin, melatonin, ondansetron **OR** ondansetron, oxygen

## 2025-08-06 NOTE — PROGRESS NOTES
08/06/25 1356   Discharge Planning   Living Arrangements Children   Support Systems Children   Assistance Needed ADL's/IADL's   Type of Residence Private residence   Home or Post Acute Services In home services   Type of Home Care Services Home nursing visits;Home OT;Home PT   Expected Discharge Disposition Home H   Does the patient need discharge transport arranged? Yes   [facility] coordination needed? Yes   Patient Choice   Patient / Family choosing to utilize agency / facility established prior to hospitalization No   Stroke Family Assessment   Stroke Family Assessment Needed Yes   Primary Caregiver/Family After Discharge Adult Child   Additional Support if 24 hour Supervision Required Yes   Physical Layout of Home Two Story   Caregiver/Family can provide assistance with ADL's Yes   Caregiver/Family can provide mobility assistance for transfers in and out of bed, chair or car Yes   Medications: Caregiver/Family can obtain prescriptions Yes   Medications: Caregiver/Family can assist with medication administration Yes   Medications: Caregiver/Family verbalizes understanding of medications Yes   Caregiver/Family agrees with discharge plan to home Yes   Caregiver/Family verbalizes capability of caring for patient at home Yes   Intensity of Service   Intensity of Service >30 min     Pt lives at home with daughter- Alma. Daughter at bedside. Pt is A&Ox1. Alma confirms pts PCP as Norma. Alma states that pt was utilizing a cane and/or walker. Alma states that she provides transportation to and from appointments, prepares meals, and helps pt with medications. Alma states that she works parttime 6am-10am but is off on FMLA currently for pt. Alma states that pt is not going to SNF. Alma states pt was there once in the past. Alma states it was very hard for her to get home from the facility and will not go through that again. Alma states that she would be agreeable for [facility] for pt. Alma states that she does not have other family  members to help with pt when she is at work but states that pt's insurance does offer HHA coverage and that she will reach out to see about getting additional help. Alma states up until recently pt was able to be home alone. Alma states she would pack lunches and leave pt's medication out for her and was able to call and check on her until recently. Palliative care consulted. PT/OT pending.

## 2025-08-07 LAB
GLUCOSE BLD MANUAL STRIP-MCNC: 110 MG/DL (ref 74–99)
TSH SERPL-ACNC: 1.89 MIU/L (ref 0.44–3.98)

## 2025-08-07 PROCEDURE — 97530 THERAPEUTIC ACTIVITIES: CPT | Mod: GO | Performed by: OCCUPATIONAL THERAPIST

## 2025-08-07 PROCEDURE — 97110 THERAPEUTIC EXERCISES: CPT | Mod: GP,CQ

## 2025-08-07 PROCEDURE — 2500000001 HC RX 250 WO HCPCS SELF ADMINISTERED DRUGS (ALT 637 FOR MEDICARE OP): Performed by: NURSE PRACTITIONER

## 2025-08-07 PROCEDURE — 99233 SBSQ HOSP IP/OBS HIGH 50: CPT | Performed by: FAMILY MEDICINE

## 2025-08-07 PROCEDURE — 2060000001 HC INTERMEDIATE ICU ROOM DAILY

## 2025-08-07 PROCEDURE — 97116 GAIT TRAINING THERAPY: CPT | Mod: GP,CQ

## 2025-08-07 PROCEDURE — 2500000001 HC RX 250 WO HCPCS SELF ADMINISTERED DRUGS (ALT 637 FOR MEDICARE OP): Performed by: FAMILY MEDICINE

## 2025-08-07 PROCEDURE — 97166 OT EVAL MOD COMPLEX 45 MIN: CPT | Mod: GO | Performed by: OCCUPATIONAL THERAPIST

## 2025-08-07 PROCEDURE — 2500000002 HC RX 250 W HCPCS SELF ADMINISTERED DRUGS (ALT 637 FOR MEDICARE OP, ALT 636 FOR OP/ED): Performed by: NURSE PRACTITIONER

## 2025-08-07 PROCEDURE — 82947 ASSAY GLUCOSE BLOOD QUANT: CPT

## 2025-08-07 RX ORDER — ACETAMINOPHEN 650 MG/1
650 SUPPOSITORY RECTAL EVERY 4 HOURS PRN
Status: ACTIVE | OUTPATIENT
Start: 2025-08-07

## 2025-08-07 RX ORDER — ACETAMINOPHEN 160 MG/5ML
650 SOLUTION ORAL EVERY 4 HOURS PRN
Status: DISPENSED | OUTPATIENT
Start: 2025-08-07

## 2025-08-07 RX ORDER — OLANZAPINE 10 MG/2ML
5 INJECTION, POWDER, FOR SOLUTION INTRAMUSCULAR ONCE
Status: COMPLETED | OUTPATIENT
Start: 2025-08-08 | End: 2025-08-07

## 2025-08-07 RX ORDER — ACETAMINOPHEN 325 MG/1
650 TABLET ORAL EVERY 4 HOURS PRN
Status: DISPENSED | OUTPATIENT
Start: 2025-08-07

## 2025-08-07 RX ADMIN — ATORVASTATIN CALCIUM 40 MG: 40 TABLET, FILM COATED ORAL at 21:23

## 2025-08-07 RX ADMIN — OXYCODONE HYDROCHLORIDE AND ACETAMINOPHEN 500 MG: 500 TABLET ORAL at 10:50

## 2025-08-07 RX ADMIN — Medication 25 MCG: at 10:50

## 2025-08-07 RX ADMIN — FERROUS SULFATE TAB 325 MG (65 MG ELEMENTAL FE) 1 TABLET: 325 (65 FE) TAB at 10:50

## 2025-08-07 RX ADMIN — LEVOTHYROXINE SODIUM 125 MCG: 0.12 TABLET ORAL at 05:07

## 2025-08-07 RX ADMIN — PANTOPRAZOLE SODIUM 40 MG: 40 TABLET, DELAYED RELEASE ORAL at 05:07

## 2025-08-07 RX ADMIN — Medication 1000 MCG: at 10:50

## 2025-08-07 RX ADMIN — LOSARTAN POTASSIUM 25 MG: 25 TABLET, FILM COATED ORAL at 10:50

## 2025-08-07 RX ADMIN — OLANZAPINE 5 MG: 10 INJECTION, POWDER, FOR SOLUTION INTRAMUSCULAR at 23:45

## 2025-08-07 RX ADMIN — METOPROLOL SUCCINATE 100 MG: 50 TABLET, EXTENDED RELEASE ORAL at 10:50

## 2025-08-07 RX ADMIN — ACETAMINOPHEN 650 MG: 160 SOLUTION ORAL at 12:26

## 2025-08-07 ASSESSMENT — PAIN - FUNCTIONAL ASSESSMENT
PAIN_FUNCTIONAL_ASSESSMENT: 0-10
PAIN_FUNCTIONAL_ASSESSMENT: 0-10

## 2025-08-07 ASSESSMENT — COGNITIVE AND FUNCTIONAL STATUS - GENERAL
HELP NEEDED FOR BATHING: TOTAL
STANDING UP FROM CHAIR USING ARMS: A LOT
CLIMB 3 TO 5 STEPS WITH RAILING: A LOT
STANDING UP FROM CHAIR USING ARMS: A LOT
TOILETING: A LOT
WALKING IN HOSPITAL ROOM: TOTAL
DRESSING REGULAR UPPER BODY CLOTHING: A LITTLE
PERSONAL GROOMING: A LOT
DAILY ACTIVITIY SCORE: 8
MOVING TO AND FROM BED TO CHAIR: A LOT
DAILY ACTIVITIY SCORE: 14
PERSONAL GROOMING: TOTAL
EATING MEALS: A LOT
EATING MEALS: A LOT
MOBILITY SCORE: 10
TURNING FROM BACK TO SIDE WHILE IN FLAT BAD: A LITTLE
MOBILITY SCORE: 14
MOVING FROM LYING ON BACK TO SITTING ON SIDE OF FLAT BED WITH BEDRAILS: A LOT
MOVING FROM LYING ON BACK TO SITTING ON SIDE OF FLAT BED WITH BEDRAILS: A LITTLE
TURNING FROM BACK TO SIDE WHILE IN FLAT BAD: A LOT
DRESSING REGULAR LOWER BODY CLOTHING: TOTAL
DRESSING REGULAR UPPER BODY CLOTHING: A LOT
TOILETING: TOTAL
HELP NEEDED FOR BATHING: A LITTLE
WALKING IN HOSPITAL ROOM: A LOT
CLIMB 3 TO 5 STEPS WITH RAILING: TOTAL
DRESSING REGULAR LOWER BODY CLOTHING: A LOT
MOVING TO AND FROM BED TO CHAIR: A LOT

## 2025-08-07 ASSESSMENT — ACTIVITIES OF DAILY LIVING (ADL)
ADL_ASSISTANCE: INDEPENDENT
BATHING_ASSISTANCE: TOTAL

## 2025-08-07 ASSESSMENT — PAIN SCALES - GENERAL
PAINLEVEL_OUTOF10: 0 - NO PAIN
PAINLEVEL_OUTOF10: 0 - NO PAIN
PAINLEVEL_OUTOF10: 3
PAINLEVEL_OUTOF10: 0 - NO PAIN

## 2025-08-07 NOTE — PROGRESS NOTES
Occupational Therapy  Evaluation    Patient Name: Kaitlin Sarkar  MRN: [id number]  Today's Date: 8/7/2025  Time Calculation  Start Time: 0923  Stop Time: 0958  Time Calculation (min): 35 min    Current Problem:   1. Delirium    2. Acute kidney injury    3. Urinary tract infection with hematuria, site unspecified    4. Intraparenchymal hemorrhage of brain (Multi)    5. Acute CVA (cerebrovascular accident) (Multi)    6. Clair coma scale total score 9-12, in the field (EMT or ambulance)    7. Atrial fibrillation with rapid ventricular response (Multi)    8. Chronic atrial fibrillation (Multi)    9. Generalized weakness    10. Other cerebral infarction    11. Transient cerebral ischemic attack, unspecified        OT order: OT eval and treat   Referred by: Harvinder  Reason for referral: Decreased ADLs, AMS  Past medical history related to rehab: HTN, Afib, T2DM, AMS    Precautions:   Medical Precautions: Fall precautions    ASSESSMENT  OT Assessment: OT eval completed. Pt demonstrated decreased UE strength and coordiantion, decreased funcitonal transfers, mobility and balance which impacts patient independence and safety. Continued skilled OT services can benefit pt to address these needs.. Pt with Decreased ADL status, Decreased upper extremity range of motion, Decreased upper extremity strength, Decreased fine motor control, Decreased functional mobility, Decreased gross motor control, Decreased trunk control for functional activities  Prognosis:    Barriers to discharge home: Caregiver assistance, Physical needs, Cognition needs  Caregiver assistance needed per identified barriers - however, level of patient's required assistance exceeds assistance available at home  24hr mobility assistance needed, 24hr ADL assistance needed, High falls risk due to function or environment  24hr supervision for safety awareness needed, Medication and/or medical management daily assist needed, Insight of patient limited regarding  functional ability/needs  Tolerance: Patient limited by fatigue    PLAN  Frequency: 4 times per week (during this acute hospital stay)  Treatment Interventions: ADL retraining, Functional transfer training, UE strengthening/ROM, Neuromuscular reeducation, Fine motor coordination activities, Compensatory technique education  Discharge Recommendations: Moderate intensity level of continued care (Based on current functional status and rehab potential, patient is anticipated to tolerate and benefit from 5 or more days per week of skilled rehabilitative therapy after discharge from this acute inpatient hospitalization.)  OT OK to discharge: Yes    GENERAL VISIT INFORMATION   Start of session communication: Bedside nurse  End of session communication: Bedside nurse  Family/caregiver present: Yes  Caregiver feedback:  (DTR observed session)  Co-Treatment: PT  Reason for co-treatment:  (To maximize pt safety and mobility)   Position Pt Received:  Bed, 3 rail up, Alarm on  End of session position: Up in room, Alarm on (One on one observer in room with pt)    SUBJECTIVE  Home Living:  Type of Home: House  Lives With: Adult children (pt lives with daughter)  Home Adaptive Equipment: Cane, Walker rolling or standard  Home Layout: One level  Home Access: Stairs to enter with rails  Entrance Stairs-Number of Steps: 2  Home Living Comments: Daughter will be returning to work next week and will be working 4 hours a day.     Prior Level of Function:  Receives Help From: Family  ADL Assistance: Independent (Per daughter pt was independent with dressing, tioleting, grooming and bathing (sponge bathed) prior to illness)  Homemaking Assistance: Needs assistance (Daughter completed most most IADLs)  Ambulatory Assistance:  (Per daughter pt ambulated with cane or walker in the house with modified independent)  Transfers:  (Per daughter pt transferred with modified independent with a cane or walker)  Hand Dominance: Right  Prior Function  Comments:  (Daughter drives pt)    Pain:  Score: 0 - No pain      OBJECTIVE    Cognition:  Overall Cognitive Status: Impaired at baseline  Orientation Level: Disoriented to time, Disoriented to situation, Disoriented to place  Insight: Severe  Processing Speed: Delayed      Current ADL function:   EATING:  Moderate (Pt able to grasp water cup with both hands and bring slightly toward mouth but mostly brought mouth down to straw. Pt could not reach cup when held in front of her and cup had to be placed right in front of hands.)     GROOMING: Maximal     BATHING: Total     UB DRESSING: Maximal     LB DRESSING: Total     TOILETING: Total    ADL comments:  Prompted pt to brush hair supported sitting in chair. Pt picked up hairbrush with R hand & held onto bristle part of brush and not the handle. Prompted pt to look at brush and she if she was holding brush correctly, pt was not able to identify or correct. Pt not able to reach head with brush when brush once repositoned. Max hand over hand A to facilitate brushing hair.    Activity Tolerance:  Endurance: Tolerates 10 - 20 min exercise with multiple rests    Bed Mobility/Transfers:   Bed Mobility  Bed Mobility:  (Supine > sit with HOB elevated with Mod A x 2 with Mod verbal and tactile prompts for hand placement especially with LUE)  Transfers  Transfer:  (Sit <> stand with Mod A x 2 with verbal and tactile prompts for)    Ambulation/Gait Training:  Functional Mobility  Functional Mobility Performed:  (Functional mobility with FWW from bed to chair. Mod A x 2, with assistance to keep L wrist postioned well on walker handle, but pt able to .)    Sitting Balance:  Static Sitting Balance  Static Sitting-Level of Assistance: Minimum assistance    Standing Balance:  Static Standing Balance  Static Standing-Level of Assistance: Moderate assistance    Vision: Vision - Basic Assessment  Current Vision: Wears glasses all the time (Pt was able to correctly read clock on the  wall. When reaching for item with R and LUE pt was not able to contact target)      Sensation:  Sensation Comment:  (Pt reports that LUE sensation feels the same as R but feels heavy)    Strength:  Strength Comments: RUE 2+/5, LUE 2-/5    Coordination:  Movements are Fluid and Coordinated: No  Upper Body Coordination: moving slow and not able to reach to target with R or L UE, L  and fine motor control decreased     Hand Function:  Hand Function  Gross Grasp:  (R functional , L impaired)    Extremities: RUE   RUE :  (PROM WFL) and LUE   LUE:  (PROM WFL)    Outcome Measures: [facility] Daily Activity   Putting on and taking off regular lower body clothing: Total  Bathing (including washing, rinsing, drying): Total  Putting on and taking off regular upper body clothing: A lot  Toileting, which includes using toilet, bedpan or urinal: Total  Taking care of personal grooming such as brushing teeth: Total   Eating Meals: A lot   Daily Activity - Total Score: 8    EDUCATION:     Education Documentation  ADL Training, taught by MEME Caruso at 8/7/2025 10:52 AM.  Learner: Patient  Readiness: Acceptance  Method: Demonstration, Explanation  Response: Needs Reinforcement    Education Comments  No comments found.        Goals:   Encounter Problems       Encounter Problems (Active)       ADLs       Patient will feed self with minimal assist  level of assistance and verbal cues and tactile cues using PRN adaptive equipment. (Progressing)       Start:  08/07/25            Patient will complete daily grooming tasks washing face/brushing hair with minimal assist  level of assistance and PRN adaptive equipment while supported sitting. (Progressing)       Start:  08/07/25               BALANCE       Patient will maintain static standing balance fro 5 mins during ADL task with minimal assist  level of assistance in order to demonstrate decreased risk of falling and improved postural control. (Progressing)       Start:  08/07/25                EXERCISE/STRENGTHENING       Patient will be educated on BUE HEP for increased ADL performance. (Progressing)       Start:  08/07/25    Expected End:  08/21/25               MOBILITY       Patient will perform functional mobility min household distances with minimal assist  level of assistance and least restrictive device in order to improve safety and functional mobility. (Progressing)       Start:  08/07/25    Expected End:  08/21/25               TRANSFERS       Patient will complete functional transfers with least restrictive device with minimal assist  level of assistance. (Progressing)       Start:  08/07/25    Expected End:  08/21/25               Completion of this session, clinical decision making, and documentation performed under the supervision/direction of RANDI Lacy/RIVER HOWARDOT

## 2025-08-07 NOTE — PROGRESS NOTES
Physical Therapy    Physical Therapy Treatment    Patient Name: Kaitlin Sarkar  MRN: [id number]  Department: [address]  Room: 2028/2028-A  Today's Date: 8/7/2025  Time Calculation  Start Time: 0924  Stop Time: 0953  Time Calculation (min): 29 min         Assessment/Plan   PT Assessment  PT Assessment Results: Decreased strength, Decreased endurance, Impaired balance, Decreased range of motion, Decreased mobility, Decreased cognition, Impaired judgement, Decreased safety awareness  Rehab Prognosis: Good  Barriers to Discharge Home: Caregiver assistance, Cognition needs, Physical needs  Evaluation/Treatment Tolerance: Patient tolerated treatment well  End of Session Communication: Bedside nurse  Assessment Comment: pt's daughter present. pt just got back into bed from nursing staff attempting to get pt up to chair. pt required increased time to process cues. pt easily distracted when alot of people are talking in the room. pt stood from EOB with forward flexed posture and bent knee. verbal and tactile cues to take steps towards chair. pt had difficulty with moving RLE and had NBOS. cues on safety with mod assist x2 to perform all tasks.  End of Session Patient Position: Up in chair, Alarm on     PT Plan  Treatment/Interventions: Bed mobility, Transfer training, Gait training, Neuromuscular re-education, Therapeutic exercise, Therapeutic activity  PT Plan: Ongoing PT  PT Frequency: 5 times per week (during this acute adm)  PT Discharge Recommendations: Moderate intensity level of continued care, Other (comment) (Based on current functional status and rehab potential, patient is anticipated to tolerate and benefit from 5 or more days per week of skilled rehabilitative therapy after discharge from this acute inpatient hospitalization.)  Equipment Recommended upon Discharge: Wheelchair, Other (comment) (bedrails. recomm dtr borrow or rent a wheelchair for initial mobility, anticipate pt will needed extended rehab before able to  amb well)  PT - OK to Discharge: Yes (to next level of care)    PT Visit Info:  PT Received On: 08/07/25     General Visit Information:   General  Prior to Session Communication: Bedside nurse  Patient Position Received: Bed, 3 rail up, Alarm on    Subjective   Precautions:  Precautions  Medical Precautions: Fall precautions            Objective   Pain:  Pain Assessment  0-10 (Numeric) Pain Score: 0 - No pain  Cognition:  Cognition  Overall Cognitive Status: Impaired at baseline  Orientation Level: Disoriented to time, Disoriented to situation, Disoriented to place  Coordination:       Treatments:  Therapeutic Exercise  Therapeutic Exercise Performed:  (bilat LE mod- max AAROM LAQ, hip flexion, ankle pumps x 12 ea)    Bed Mobility 1  Bed Mobility 1: Supine to sitting  Level of Assistance 1: Moderate assistance (x2)    Ambulation/Gait Training 1  Surface 1: Level tile  Device 1: Rolling walker  Assistance 1: Moderate assistance (x2)  Quality of Gait 1: Diminished heel strike, Narrow base of support, Inconsistent stride length, Decreased step length, Shuffling gait, Forward flexed posture  Comments/Distance (ft) 1: 2ft  Transfer 1  Technique 1: Sit to stand, Stand to sit  Transfer Device 1: Walker  Transfer Level of Assistance 1: Moderate assistance (x2)    Outcome Measures:  [facility] Basic Mobility  Turning from your back to your side while in a flat bed without using bedrails: A lot  Moving from lying on your back to sitting on the side of a flat bed without using bedrails: A lot  Moving to and from bed to chair (including a wheelchair): A lot  Standing up from a chair using your arms (e.g. wheelchair or bedside chair): A lot  To walk in hospital room: Total  Climbing 3-5 steps with railing: Total  Basic Mobility - Total Score: 10    Education Documentation  Mobility Training, taught by Marlys Sams PTA at 8/7/2025 10:49 AM.  Learner: Patient  Readiness: Acceptance  Method: Explanation  Response: Verbalizes  Understanding    Education Comments  No comments found.        OP EDUCATION:       Encounter Problems       Encounter Problems (Active)       Mobility       STG - Patient will ambulate household distance using WALKER (instead of cane used at baseline) with no more than min Ax1 so that dtr can manage pt at home (Not Progressing)       Start:  08/06/25    Expected End:  08/20/25            STG - Patient will navigate 2 steps with rails/device and no more than MOD Ax1 to enter/exit home with dtr assist  (Not Progressing)       Start:  08/06/25    Expected End:  08/20/25               PT Transfers       STG - Transfer from bed to chair/BSC using WALKER (instead of cane used at baseline) with no more than min Ax1 so that dtr can manage pt at home (Not Progressing)       Start:  08/06/25    Expected End:  08/20/25               Strengthening        Pt will perform 10+ reps AAROM/AROM/RROM BLE to improve functional strength needed for improved mobility.  (Not Progressing)       Start:  08/06/25    Expected End:  08/20/25

## 2025-08-07 NOTE — PROGRESS NOTES
Pt's daughter - lAma now agreeable to SNF. Alma realizes that pt will need more care than she initially thought. Amla was provided with a [employer] list of skilled SNF agencies that are in network with patient's insurance payor, service patient's preferred geographic region, and that displays CMS star ratings. [facility] following for choice.

## 2025-08-07 NOTE — CARE PLAN
The patient's goals for the shift include      The clinical goals for the shift include pt will have an unchanged neuro assessment      Problem: Skin  Goal: Decreased wound size/increased tissue granulation at next dressing change  Outcome: Progressing  Flowsheets (Taken 8/6/2025 2230)  Decreased wound size/increased tissue granulation at next dressing change: Promote sleep for wound healing  Goal: Participates in plan/prevention/treatment measures  Outcome: Progressing  Flowsheets (Taken 8/6/2025 2230)  Participates in plan/prevention/treatment measures: Elevate heels  Goal: Prevent/manage excess moisture  Outcome: Progressing  Flowsheets (Taken 8/6/2025 2230)  Prevent/manage excess moisture: Cleanse incontinence/protect with barrier cream  Goal: Prevent/minimize sheer/friction injuries  Outcome: Progressing  Flowsheets (Taken 8/6/2025 2230)  Prevent/minimize sheer/friction injuries: Turn/reposition every 2 hours/use positioning/transfer devices  Goal: Promote/optimize nutrition  Outcome: Progressing  Flowsheets (Taken 8/6/2025 2230)  Promote/optimize nutrition: Assist with feeding  Goal: Promote skin healing  Outcome: Progressing  Flowsheets (Taken 8/6/2025 2230)  Promote skin healing: Assess skin/pad under line(s)/device(s)     Problem: Pain - Adult  Goal: Verbalizes/displays adequate comfort level or baseline comfort level  Outcome: Progressing     Problem: Safety - Adult  Goal: Free from fall injury  Outcome: Progressing     Problem: Discharge Planning  Goal: Discharge to home or other facility with appropriate resources  Outcome: Progressing     Problem: Chronic Conditions and Co-morbidities  Goal: Patient's chronic conditions and co-morbidity symptoms are monitored and maintained or improved  Outcome: Progressing     Problem: Nutrition  Goal: Nutrient intake appropriate for maintaining nutritional needs  Outcome: Progressing     Problem: Diabetes  Goal: Achieve decreasing blood glucose levels by end of  shift  Outcome: Progressing  Goal: Increase stability of blood glucose readings by end of shift  Outcome: Progressing  Goal: Decrease in ketones present in urine by end of shift  Outcome: Progressing  Goal: Maintain electrolyte levels within acceptable range throughout shift  Outcome: Progressing  Goal: Maintain glucose levels >70mg/dl to <250mg/dl throughout shift  Outcome: Progressing  Goal: No changes in neurological exam by end of shift  Outcome: Progressing  Goal: Learn about and adhere to nutrition recommendations by end of shift  Outcome: Progressing  Goal: Vital signs within normal range for age by end of shift  Outcome: Progressing  Goal: Increase self care and/or family involovement by end of shift  Outcome: Progressing  Goal: Receive DSME education by end of shift  Outcome: Progressing     Problem: General Stroke  Goal: Establish a mutual long term goal with patient by discharge  Outcome: Progressing  Goal: Demonstrate improvement in neurological exam throughout the shift  Outcome: Progressing  Goal: Maintain BP within ordered limits throughout shift  Outcome: Progressing  Goal: Participate in treatment (ie., meds, therapy) throughout shift  Outcome: Progressing  Goal: No symptoms of aspiration throughout shift  Outcome: Progressing  Goal: No symptoms of hemorrhage throughout shift  Outcome: Progressing  Goal: Tolerate enteral feeding throughout shift  Outcome: Progressing  Goal: Decreased nausea/vomiting throughout shift  Outcome: Progressing  Goal: Controlled blood glucose throughout shift  Outcome: Progressing  Goal: Out of bed three times today  Outcome: Progressing     Problem: ICU Stroke  Goal: Maintain ICP within ordered limits throughout shift  Outcome: Progressing  Goal: Tolerate EVD clamping trial throughout shift  Outcome: Progressing  Goal: Tolerate ventilator weaning trial during shift  Outcome: Progressing  Goal: Maintain patent airway throughout shift  Outcome: Progressing  Goal:  Achieve/maintain targeted sodium level throughout shift  Outcome: Progressing

## 2025-08-07 NOTE — PROGRESS NOTES
SW consult placed for hospice. SW met with pt and pt's daughter, Alma at bedside to assess needs and provide support, introduced self and my role as  with care transition team. Pt's daughter expressed she is no longer interested in hospice and wishes to pursue SNF at this time. SW to notify [facility]. No other needs identified at this time, SW signing off,  pt and care team aware of SW availability while inpt.    IRENE Thurston ([id number])   Care Transitions

## 2025-08-07 NOTE — PROGRESS NOTES
"Kaitlin Sarkar is a 89 y.o. female on day 2 of admission presenting with Delirium.    Subjective   Patient seen awake in chair in room. Hse reports eating well, reports discomfort in llanes catheter. No reported overnight events.     ROS:  Constitutional: No fever, no chills, no fatigue  HEENT: No headache, no vision changes, no hearing loss, no nasal congestion  Respiratory: No cough, no SOB  Cardiac: No chest pain, no palpitations, no swelling of limbs  GI: No nausea, no vomiting, no diarrhea  Musculoskeletal: No back pain, no myalgia  Neuro: No seizures, no dizziness, no lightheadedness  Heme: No easy bruising, no bleeding  Genitourinary:  Pelvic pain. No Urgency       Objective     Physical Exam  Gen: Elderly adult female , NAD, age appropriate.  HEENT: Normocephalic, atraumatic, good dentition, no oral lesions appreciated  Neck: No cervical lymphadenopathy appreciated, no thyroid enlargement appreciated  CV: No limb edema appreciated  Resp: No increased work of breathing, no wheezes appreciated  Abdomen: nondistended  MSK: No joint swelling appreciated  Neuro: awake, alert, answering questions, unclear if oriented  Psych: appropriate mood and affect      Last Recorded Vitals  Blood pressure (!) 164/97, pulse (!) 116, temperature 36.6 °C (97.8 °F), temperature source Temporal, resp. rate 16, height 1.575 m (5' 2.01\"), weight 46.7 kg (102 lb 15.3 oz), SpO2 93%.  Intake/Output last 3 Shifts:  I/O last 3 completed shifts:  In: 1238.8 (26.5 mL/kg) [P.O.:400; I.V.:838.8 (18 mL/kg)]  Out: 1925 (41.2 mL/kg) [Urine:1925 (1.1 mL/kg/hr)]  Weight: 46.7 kg       Relevant Results  Scheduled medications  Scheduled Medications[1]  Continuous medications  Continuous Medications[2]  PRN medications  PRN Medications[3]    Results for orders placed or performed during the hospital encounter of 08/05/25 (from the past 24 hours)   Transthoracic Echo Complete   Result Value Ref Range    LVOT diam 1.88 cm    LV Biplane EF 51 %    MV E/A " ratio 2.08     MV avg E/e' ratio 14.43     Tricuspid annular plane systolic excursion 1.9 cm    LA vol index A/L 42.8 ml/m2    LV EF 55 %    RV free wall pk S' 12.98 cm/s    RVSP 53 mmHg    LVIDd 4.32 cm    LV A4C EF 53.2        Imaging  MR brain wo IV contrast  Addendum Date: 8/6/2025  Interpreted By:  Shad Bryan, ADDENDUM: A subcentimeter focus of diffusion restriction is also noted in the expected location of the right motor strip at the vertex. Finding is consistent with acute infarction in the distribution of the right middle cerebral artery.   Signed by: Shad Bryan 8/6/2025 3:35 PM   -------- ORIGINAL REPORT -------- Dictation workstation:   [id number]    Addendum Date: 8/6/2025  Interpreted By:  Shad Bryan, ADDENDUM: Shad Bryan discussed the significance and urgency of this critical finding by EPIC secure chat with  MIKAYLA SERNA; JULIA WONG on 8/6/2025 at 12:08 pm.  (**-RCF-**) Findings:  See findings.     Signed by: Shad Bryan 8/6/2025 12:08 PM   -------- ORIGINAL REPORT -------- Dictation workstation:   [id number]    Result Date: 8/6/2025  Acute/subacute infarction in the distribution of the posterior division of the left middle cerebral artery. No associated hemorrhage or mass effect Abundant foci of hemosiderin deposition especially in the right hemisphere but also within the left hemisphere and midbrain of uncertain cause and significance. Consider amyloid angiopathy MRA of the carotid bifurcations is within normal limits. There is a paucity of distal branches of the right middle cerebral artery which could be artifactual There is no evidence of intracranial aneurysm or vascular malformation.   MACRO: none   Signed by: Shad Bryan 8/6/2025 11:30 AM Dictation workstation:   [id number]    MR angio head wo IV contrast  Addendum Date: 8/6/2025  Interpreted By:  Shad Bryan, ADDENDUM: A subcentimeter focus of diffusion restriction is also noted in the  expected location of the right motor strip at the vertex. Finding is consistent with acute infarction in the distribution of the right middle cerebral artery.   Signed by: Shad Bryan 8/6/2025 3:35 PM   -------- ORIGINAL REPORT -------- Dictation workstation:   [id number]    Addendum Date: 8/6/2025  Interpreted By:  Shad Bryan, ADDENDUM: Shad Bryan discussed the significance and urgency of this critical finding by EPIC secure chat with  MIKAYLA SERNA; JULIA MARVIN on 8/6/2025 at 12:08 pm.  (**-RCF-**) Findings:  See findings.     Signed by: Shad Bryan 8/6/2025 12:08 PM   -------- ORIGINAL REPORT -------- Dictation workstation:   [id number]    Result Date: 8/6/2025  Acute/subacute infarction in the distribution of the posterior division of the left middle cerebral artery. No associated hemorrhage or mass effect Abundant foci of hemosiderin deposition especially in the right hemisphere but also within the left hemisphere and midbrain of uncertain cause and significance. Consider amyloid angiopathy MRA of the carotid bifurcations is within normal limits. There is a paucity of distal branches of the right middle cerebral artery which could be artifactual There is no evidence of intracranial aneurysm or vascular malformation.   MACRO: none   Signed by: Shad Bryan 8/6/2025 11:30 AM Dictation workstation:   [id number]    MR angio neck wo IV contrast  Addendum Date: 8/6/2025  Interpreted By:  Shad Bryan, ADDENDUM: A subcentimeter focus of diffusion restriction is also noted in the expected location of the right motor strip at the vertex. Finding is consistent with acute infarction in the distribution of the right middle cerebral artery.   Signed by: Shad Bryan 8/6/2025 3:35 PM   -------- ORIGINAL REPORT -------- Dictation workstation:   [id number]    Addendum Date: 8/6/2025  Interpreted By:  Shad Bryan, ADDENDUM: Shad Bryan discussed the significance and  urgency of this critical finding by EPIC secure chat with  MIKAYLA SERNA; JULIA WONG on 8/6/2025 at 12:08 pm.  (**-RCF-**) Findings:  See findings.     Signed by: Shad Bryan 8/6/2025 12:08 PM   -------- ORIGINAL REPORT -------- Dictation workstation:   [id number]    Result Date: 8/6/2025  Acute/subacute infarction in the distribution of the posterior division of the left middle cerebral artery. No associated hemorrhage or mass effect Abundant foci of hemosiderin deposition especially in the right hemisphere but also within the left hemisphere and midbrain of uncertain cause and significance. Consider amyloid angiopathy MRA of the carotid bifurcations is within normal limits. There is a paucity of distal branches of the right middle cerebral artery which could be artifactual There is no evidence of intracranial aneurysm or vascular malformation.   MACRO: none   Signed by: Shad Bryan 8/6/2025 11:30 AM Dictation workstation:   [id number]     renal complete  Result Date: 8/5/2025  Moderate right hydronephrosis and mild left pelvicaliectasis are noted in the context of a very full urinary bladder. No postvoid imaging was obtained.   Renal parenchymal echogenicity is increased bilaterally consistent with medical renal disease   MACRO: None   Signed by: Dariana Chowdhury 8/5/2025 3:14 PM Dictation workstation:   [id number]    CT head wo IV contrast  Result Date: 8/5/2025  1.  Stable appearance 5 mm round focus of hypoattenuation anterior right frontal lobe, without evidence of edema or mass effect. This likely represents an unchanged parenchymal hemorrhage or a cavernoma. 2. Chronic microvascular ischemia and involutional changes.     Signed by: Davian Odom 8/5/2025 9:07 AM Dictation workstation:   [id number]    CT head wo IV contrast  Result Date: 8/5/2025  Punctate focus of increased density in the right frontal lobe not definitively seen on prior imaging, which may represent a small focus of  hemorrhage. Consider interval CT follow-up in 4 hours to assess stability. No mass effect or midline shift.   Nonspecific scattered white matter hypodensities favored to represent sequela of small vessel ischemia.       MACRO: Evan Finkelstein discussed the significance and urgency of this critical finding by EPIC secure chat with  IRAIS PHILIP on 8/5/2025 at 4:41 am.  (**-RCF-**) Findings:  See findings.   .   Signed by: Evan Finkelstein 8/5/2025 4:41 AM Dictation workstation:   [id number]    XR chest 1 view  Result Date: 8/5/2025  1.  No definite evidence of acute cardiopulmonary process. Similar cardiomegaly.       MACRO: None   Signed by: Ashish Sauceda 8/5/2025 2:53 AM Dictation workstation:   [id number]      Cardiology, Vascular, and Other Imaging  Transthoracic Echo Complete  Result Date: 8/6/2025              [place] [address]      Phone [phone] Fax [phone] TRANSTHORACIC ECHOCARDIOGRAM REPORT Patient Name:       WESLEY Villarreal Physician:    [id number] Gallito Dumont MD Study Date:         8/6/2025             Ordering Provider:    [id number] TANG MRN/PID:            [id number]             Fellow: Accession#:         [id number]         Nurse: Date of Birth/Age:  10/18/1930 / 89      Sonographer:          Zahra Brennan RDCS                     years Gender Assigned at  F                    Additional Staff: Birth: Height:             152.40 cm            Admit Date:           8/5/2025 Weight:             51.26 kg             Admission Status:     Inpatient -                                                                Routine BSA / BMI:          1.46 m2 / 22.07      Department Location:  [facility]                     kg/m2 Blood Pressure: 153 /84 mmHg Study Type:    TRANSTHORACIC ECHO (TTE) COMPLETE  Diagnosis/ICD: Transient cerebral ischemic attack, unspecified (NOT on                LCD)-G45.9 Indication:    Stroke CPT Codes:     Echo Complete w Full Doppler-[id number]  Study Detail: The following Echo studies were performed: 2D, M-Mode, Doppler and               color flow. Patient's heart rhythm is atrial fibrillation.  PHYSICIAN INTERPRETATION: Left Ventricle: The left ventricular systolic function is low normal with a visually estimated ejection fraction of 55%. There are no regional wall motion abnormalities. The left ventricular cavity size is normal. There is mild increased septal and normal posterior left ventricular wall thickness. Left ventricular diastolic filling was indeterminate. Left Atrium: The left atrial size is mildly dilated. Right Ventricle: The right ventricle is normal in size. There is normal right ventricular global systolic function. Right Atrium: The right atrial size is mildly dilated. Aortic Valve: The aortic valve is trileaflet. There is mild aortic valve cusp calcification. There is no evidence of aortic valve stenosis. There is mild aortic valve regurgitation. Mitral Valve: The mitral valve is mildly thickened. There is mild to moderate mitral valve regurgitation. The E Vmax is 0.91 m/s. Tricuspid Valve: The tricuspid valve is structurally normal. There is moderate tricuspid regurgitation. The Doppler estimated right ventricular systolic pressure (RVSP) is moderately elevated at 58 mmHg. Pulmonic Valve: The pulmonic valve is structurally normal. There is mild pulmonic valve regurgitation. Pericardium: Trivial to small pericardial effusion. There is no evidence of cardiac tamponade. Aorta: The aortic root is normal. Systemic Veins: The inferior vena cava appears normal in size, with IVC inspiratory collapse less than 50%.  CONCLUSIONS:  1. The left ventricular systolic function is low normal with a visually estimated ejection fraction of 55%.  2. Left ventricular diastolic filling was  indeterminate.  3. There is normal right ventricular global systolic function.  4. Trivial to small pericardial effusion.  5. There is no evidence of cardiac tamponade.  6. Mild to moderate mitral valve regurgitation.  7. Moderate tricuspid regurgitation.  8. The Doppler estimated RVSP is moderately elevated at 58 mmHg.  9. Mild aortic valve regurgitation. QUANTITATIVE DATA SUMMARY:  2D MEASUREMENTS:             Normal Ranges: IVSd:            1.02 cm     (0.6-1.1cm) LVPWd:           0.72 cm     (0.6-1.1cm) LVIDd:           4.32 cm     (3.9-5.9cm) LVIDs:           2.51 cm LV Mass Index:   80.8 g/m2 LVEDV Index:     37.13 ml/m2 LV % FS          42.0 %  LEFT ATRIUM:                  Normal Ranges: LA Vol A4C:        78.2 ml    (22+/-6mL/m2) LA Vol A2C:        48.5 ml LA Vol BP:         62.6 ml LA Vol Index A4C:  53.4 ml/m2 LA Vol Index A2C:  33.1 ml/m2 LA Vol Index BP:   42.8 ml/m2 LA Area A4C:       23.1 cm2 LA Area A2C:       18.5 cm2 LA Major Axis A4C: 5.8 cm LA Major Axis A2C: 6.0 cm LA Volume Index:   42.4 ml/m2 LA Vol A4C:        73.4 ml LA Vol A2C:        45.9 ml LA Vol Index BSA:  40.7 ml/m2  RIGHT ATRIUM:          Normal Ranges: RA Area A4C:  20.4 cm2  M-MODE MEASUREMENTS:         Normal Ranges: Ao Root:             2.50 cm (2.0-3.7cm) AoV Exc:             1.60 cm (1.5-2.5cm) LAs:                 3.71 cm (2.7-4.0cm)  AORTA MEASUREMENTS:         Normal Ranges: AoV Exc:            1.60 cm (1.5-2.5cm) Ao Sinus, d:        2.80 cm (2.1-3.5cm) Ao STJ, d:          2.20 cm (1.7-3.4cm) Asc Ao, d:          2.70 cm (2.1-3.4cm)  LV SYSTOLIC FUNCTION:                      Normal Ranges: EF-A4C View:    53 % (>=55%) EF-A2C View:    45 % EF-Biplane:     51 % EF-Visual:      55 % LV EF Reported: 55 %  LV DIASTOLIC FUNCTION:           Normal Ranges: MV Peak E:             0.91 m/s  (0.7-1.2 m/s) MV Peak A:             0.44 m/s  (0.42-0.7 m/s) E/A Ratio:             2.08      (1.0-2.2) MV e'                  0.063 m/s (>8.0)  MV lateral e'          0.07 m/s MV medial e'           0.06 m/s E/e' Ratio:            14.43     (<8.0)  MITRAL VALVE:          Normal Ranges: MV DT:        166 msec (150-240msec)  AORTIC VALVE:           Normal Ranges: LVOT Max Devendra:  0.97 m/s (<=1.1m/s) LVOT VTI:      13.74 cm LVOT Diameter: 1.88 cm  (1.8-2.4cm)  RIGHT VENTRICLE: RV Basal 3.83 cm RV Mid   3.00 cm RV Major 6.2 cm TAPSE:   18.5 mm RV s'    0.13 m/s  TRICUSPID VALVE/RVSP:          Normal Ranges: Peak TR Velocity:     3.55 m/s Est. RA Pressure:     8 mmHg RV Syst Pressure:     58 mmHg  (< 30mmHg) IVC Diam:             1.90 cm  AORTA: Asc Ao Diam 2.74 cm  [id number] Gallito Dumont MD Electronically signed on 8/6/2025 at 5:16:57 PM  ** Final **     ECG 12 lead  Result Date: 8/5/2025  Atrial fibrillation Right bundle branch block Nonspecific T abnormalities, lateral leads             This patient currently has cardiac telemetry ordered; if you would like to modify or discontinue the telemetry order, click here to go to the orders activity to modify/discontinue the order.                 Assessment/Plan   Assessment & Plan        Acute cerebral infarction  MRI of the brain showed bilateral cerebral infarction significant for left MCA distribution.  Likely embolic.  Anticoagulation on hold for another 7 days to prevent hemorrhagic transformation.  Continue atorvastatin   Hold aspirin  Echocardiogram ordered and pending  Continue permissive hypertension for 48h  PT/OT  Neurologist recommendations appreciated    Multiple hemosiderin foci for?  Intracranial hemorrhage  Right frontal lobe,  Possible area of hemorrhage  Continue to hold anticoagulation for another 7 days  Neurologist recommendations appreciated  ED d/w neurosurgery felt to be stable  PT/OT eval  Monitor neuro status  Follow up with neuro surgery as outpt     Acute encephalopathy  Possibly multifactorial from CVA, intracranial hemorrhage and MITUL  Anticoagulation on hold  Less likely due to UTI  Will  discontinue antibiotics for now  TSH pending  ammonia vitamin B12 in AM  Trend BMP daily     Urinary retention and right hydronephrosis  U/S shows moderate R hydronephrosis and mild L pelvicaliectasis. Renal parenchymal echogenicity is increased bilaterally consistent  with medical renal disease  Continue Gutierrez drainage for now. Will consider starting tamulosin but deferred due to permissive hypertension for today  Urologist consulted      Atrial fibrillation  Currently rate controlled  Anticoagulation on hold due to intracranial hemorrhage and stroke prophy prevent hemorrhagic transformation      HTN  Continue home medications              I spent 50 minutes in the professional and overall care of this patient.      Curry Metcalf MD             [1] [Held by provider] apixaban, 2.5 mg, oral, BID  ascorbic acid, 500 mg, oral, Daily  atorvastatin, 40 mg, oral, Nightly  cholecalciferol, 25 mcg, oral, Daily  cyanocobalamin, 1,000 mcg, oral, Daily  ferrous sulfate, 1 tablet, oral, Daily with breakfast  levothyroxine, 125 mcg, oral, Daily  losartan, 25 mg, oral, Daily  metoprolol succinate XL, 100 mg, oral, Daily  pantoprazole, 40 mg, oral, Daily before breakfast   Or  pantoprazole, 40 mg, intravenous, Daily before breakfast  polyethylene glycol, 17 g, oral, Daily    [2]    [3] PRN medications: acetaminophen **OR** acetaminophen **OR** acetaminophen, bisacodyl, guaiFENesin, melatonin, ondansetron **OR** ondansetron, oxygen

## 2025-08-08 LAB
ALBUMIN SERPL BCP-MCNC: 3.4 G/DL (ref 3.4–5)
AMMONIA PLAS-SCNC: 42 UMOL/L (ref 16–53)
ANION GAP SERPL CALC-SCNC: 15 MMOL/L (ref 10–20)
BUN SERPL-MCNC: 34 MG/DL (ref 6–23)
CALCIUM SERPL-MCNC: 7.7 MG/DL (ref 8.6–10.3)
CHLORIDE SERPL-SCNC: 104 MMOL/L (ref 98–107)
CO2 SERPL-SCNC: 21 MMOL/L (ref 21–32)
CREAT SERPL-MCNC: 1.34 MG/DL (ref 0.5–1.05)
EGFRCR SERPLBLD CKD-EPI 2021: 37 ML/MIN/1.73M*2
ERYTHROCYTE [DISTWIDTH] IN BLOOD BY AUTOMATED COUNT: 13 % (ref 11.5–14.5)
GLUCOSE SERPL-MCNC: 128 MG/DL (ref 74–99)
HCT VFR BLD AUTO: 35.7 % (ref 36–46)
HGB BLD-MCNC: 12.5 G/DL (ref 12–16)
MAGNESIUM SERPL-MCNC: 1.77 MG/DL (ref 1.6–2.4)
MCH RBC QN AUTO: 34.3 PG (ref 26–34)
MCHC RBC AUTO-ENTMCNC: 35 G/DL (ref 32–36)
MCV RBC AUTO: 98 FL (ref 80–100)
NRBC BLD-RTO: 0 /100 WBCS (ref 0–0)
PHOSPHATE SERPL-MCNC: 2.3 MG/DL (ref 2.5–4.9)
PLATELET # BLD AUTO: 170 X10*3/UL (ref 150–450)
POTASSIUM SERPL-SCNC: 3.8 MMOL/L (ref 3.5–5.3)
RBC # BLD AUTO: 3.64 X10*6/UL (ref 4–5.2)
SODIUM SERPL-SCNC: 136 MMOL/L (ref 136–145)
VIT B12 SERPL-MCNC: >2000 PG/ML (ref 211–911)
WBC # BLD AUTO: 12.2 X10*3/UL (ref 4.4–11.3)

## 2025-08-08 PROCEDURE — 82607 VITAMIN B-12: CPT | Mod: PORLAB | Performed by: FAMILY MEDICINE

## 2025-08-08 PROCEDURE — 83735 ASSAY OF MAGNESIUM: CPT | Performed by: FAMILY MEDICINE

## 2025-08-08 PROCEDURE — 2500000001 HC RX 250 WO HCPCS SELF ADMINISTERED DRUGS (ALT 637 FOR MEDICARE OP): Performed by: NURSE PRACTITIONER

## 2025-08-08 PROCEDURE — 36415 COLL VENOUS BLD VENIPUNCTURE: CPT | Performed by: FAMILY MEDICINE

## 2025-08-08 PROCEDURE — 2060000001 HC INTERMEDIATE ICU ROOM DAILY

## 2025-08-08 PROCEDURE — 80069 RENAL FUNCTION PANEL: CPT | Performed by: FAMILY MEDICINE

## 2025-08-08 PROCEDURE — 2500000001 HC RX 250 WO HCPCS SELF ADMINISTERED DRUGS (ALT 637 FOR MEDICARE OP): Performed by: FAMILY MEDICINE

## 2025-08-08 PROCEDURE — 99233 SBSQ HOSP IP/OBS HIGH 50: CPT | Performed by: UROLOGY

## 2025-08-08 PROCEDURE — 97530 THERAPEUTIC ACTIVITIES: CPT | Mod: GO,CO

## 2025-08-08 PROCEDURE — 2500000004 HC RX 250 GENERAL PHARMACY W/ HCPCS (ALT 636 FOR OP/ED): Performed by: REGISTERED NURSE

## 2025-08-08 PROCEDURE — 97530 THERAPEUTIC ACTIVITIES: CPT | Mod: GP,CQ

## 2025-08-08 PROCEDURE — 2500000002 HC RX 250 W HCPCS SELF ADMINISTERED DRUGS (ALT 637 FOR MEDICARE OP, ALT 636 FOR OP/ED): Performed by: NURSE PRACTITIONER

## 2025-08-08 PROCEDURE — 82140 ASSAY OF AMMONIA: CPT | Performed by: FAMILY MEDICINE

## 2025-08-08 PROCEDURE — 85027 COMPLETE CBC AUTOMATED: CPT | Performed by: FAMILY MEDICINE

## 2025-08-08 PROCEDURE — 99233 SBSQ HOSP IP/OBS HIGH 50: CPT | Performed by: FAMILY MEDICINE

## 2025-08-08 PROCEDURE — 97110 THERAPEUTIC EXERCISES: CPT | Mod: GO,CO

## 2025-08-08 PROCEDURE — 2500000002 HC RX 250 W HCPCS SELF ADMINISTERED DRUGS (ALT 637 FOR MEDICARE OP, ALT 636 FOR OP/ED): Performed by: FAMILY MEDICINE

## 2025-08-08 PROCEDURE — 97110 THERAPEUTIC EXERCISES: CPT | Mod: GP,CQ

## 2025-08-08 RX ORDER — METOPROLOL TARTRATE 50 MG/1
50 TABLET ORAL 2 TIMES DAILY
Status: DISPENSED | OUTPATIENT
Start: 2025-08-08

## 2025-08-08 RX ORDER — TAMSULOSIN HYDROCHLORIDE 0.4 MG/1
0.4 CAPSULE ORAL DAILY
Status: DISPENSED | OUTPATIENT
Start: 2025-08-08

## 2025-08-08 RX ADMIN — Medication 25 MCG: at 10:12

## 2025-08-08 RX ADMIN — TAMSULOSIN HYDROCHLORIDE 0.4 MG: 0.4 CAPSULE ORAL at 13:44

## 2025-08-08 RX ADMIN — FERROUS SULFATE TAB 325 MG (65 MG ELEMENTAL FE) 1 TABLET: 325 (65 FE) TAB at 10:11

## 2025-08-08 RX ADMIN — LEVOTHYROXINE SODIUM 125 MCG: 0.12 TABLET ORAL at 04:45

## 2025-08-08 RX ADMIN — Medication 1000 MCG: at 10:12

## 2025-08-08 RX ADMIN — ATORVASTATIN CALCIUM 40 MG: 40 TABLET, FILM COATED ORAL at 20:12

## 2025-08-08 RX ADMIN — METOPROLOL TARTRATE 50 MG: 50 TABLET, FILM COATED ORAL at 13:33

## 2025-08-08 RX ADMIN — OXYCODONE HYDROCHLORIDE AND ACETAMINOPHEN 500 MG: 500 TABLET ORAL at 10:11

## 2025-08-08 RX ADMIN — PANTOPRAZOLE SODIUM 40 MG: 40 TABLET, DELAYED RELEASE ORAL at 04:45

## 2025-08-08 RX ADMIN — ACETAMINOPHEN 325 MG: 325 TABLET ORAL at 16:37

## 2025-08-08 RX ADMIN — LOSARTAN POTASSIUM 25 MG: 25 TABLET, FILM COATED ORAL at 10:11

## 2025-08-08 RX ADMIN — METOPROLOL TARTRATE 50 MG: 50 TABLET, FILM COATED ORAL at 20:12

## 2025-08-08 ASSESSMENT — PAIN - FUNCTIONAL ASSESSMENT
PAIN_FUNCTIONAL_ASSESSMENT: 0-10

## 2025-08-08 ASSESSMENT — PAIN SCALES - GENERAL
PAINLEVEL_OUTOF10: 0 - NO PAIN
PAINLEVEL_OUTOF10: 3
PAINLEVEL_OUTOF10: 0 - NO PAIN
PAINLEVEL_OUTOF10: 0 - NO PAIN
PAINLEVEL_OUTOF10: 3
PAINLEVEL_OUTOF10: 0 - NO PAIN
PAINLEVEL_OUTOF10: 0 - NO PAIN
PAINLEVEL_OUTOF10: 3
PAINLEVEL_OUTOF10: 0 - NO PAIN
PAINLEVEL_OUTOF10: 0 - NO PAIN

## 2025-08-08 ASSESSMENT — COGNITIVE AND FUNCTIONAL STATUS - GENERAL
EATING MEALS: TOTAL
MOVING TO AND FROM BED TO CHAIR: A LOT
DRESSING REGULAR LOWER BODY CLOTHING: TOTAL
DAILY ACTIVITIY SCORE: 11
EATING MEALS: A LOT
DRESSING REGULAR LOWER BODY CLOTHING: A LOT
MOVING FROM LYING ON BACK TO SITTING ON SIDE OF FLAT BED WITH BEDRAILS: A LOT
TURNING FROM BACK TO SIDE WHILE IN FLAT BAD: A LOT
TOILETING: TOTAL
DAILY ACTIVITIY SCORE: 8
HELP NEEDED FOR BATHING: A LOT
STANDING UP FROM CHAIR USING ARMS: A LOT
WALKING IN HOSPITAL ROOM: TOTAL
DRESSING REGULAR UPPER BODY CLOTHING: TOTAL
CLIMB 3 TO 5 STEPS WITH RAILING: TOTAL
MOVING FROM LYING ON BACK TO SITTING ON SIDE OF FLAT BED WITH BEDRAILS: A LITTLE
TOILETING: A LOT
TURNING FROM BACK TO SIDE WHILE IN FLAT BAD: A LOT
WALKING IN HOSPITAL ROOM: A LOT
MOVING TO AND FROM BED TO CHAIR: A LOT
DRESSING REGULAR UPPER BODY CLOTHING: A LOT
MOBILITY SCORE: 13
CLIMB 3 TO 5 STEPS WITH RAILING: A LOT
PERSONAL GROOMING: A LOT
STANDING UP FROM CHAIR USING ARMS: A LOT
MOBILITY SCORE: 10
PERSONAL GROOMING: A LOT
HELP NEEDED FOR BATHING: TOTAL

## 2025-08-08 NOTE — CONSULTS
"Reason For Consult  Urinary retention, right hydronephrosis    History Of Present Illness  Kaitlin Sarkar is a 89 y.o. female presenting with mental status change.  Gutierrez catheter inserted in the ER for retention    No prior  history    Tmax 98.9, T-current 98.7    Renal ultrasound right hydronephrosis    White count 12.2, hemoglobin 12.5, creatinine 1.34     Past Medical History  She has no past medical history on file.    Surgical History  She has no past surgical history on file.     Social History  She reports that she has never smoked. She has never used smokeless tobacco. She reports that she does not drink alcohol and does not use drugs.    Family History  Family History[1]     Allergies  Penicillin    Review of Systems       Physical Exam       Last Recorded Vitals  Blood pressure 149/90, pulse 105, temperature 36.2 °C (97.2 °F), temperature source Temporal, resp. rate 18, height 1.575 m (5' 2.01\"), weight 46.7 kg (102 lb 15.3 oz), SpO2 96%.    Relevant Results  Renal ultrasound IMPRESSION:  Moderate right hydronephrosis and mild left pelvicaliectasis are  noted in the context of a very full urinary bladder. No postvoid  imaging was obtained.      Renal parenchymal echogenicity is increased bilaterally consistent  with medical renal disease     Assessment/Plan     Impression  Urinary retention  Right hydronephrosis  Renal insufficiency    Plan  Continue Gutierrez catheter  Voiding trial when medically stable  Outpatient follow-up in 1 months  Monitor BUN/creatinine and urine output    I spent 25 minutes in the professional and overall care of this patient.      Gene Varela MD         [1] No family history on file.    "

## 2025-08-08 NOTE — CARE PLAN
Problem: Skin  Goal: Decreased wound size/increased tissue granulation at next dressing change  Outcome: Progressing  Flowsheets (Taken 8/8/2025 1941)  Decreased wound size/increased tissue granulation at next dressing change: Protective dressings over bony prominences  Goal: Participates in plan/prevention/treatment measures  Outcome: Progressing  Flowsheets (Taken 8/8/2025 1941)  Participates in plan/prevention/treatment measures:   Discuss with provider PT/OT consult   Elevate heels  Goal: Prevent/manage excess moisture  Outcome: Progressing  Flowsheets (Taken 8/8/2025 1941)  Prevent/manage excess moisture: Monitor for/manage infection if present  Goal: Prevent/minimize sheer/friction injuries  Outcome: Progressing  Flowsheets (Taken 8/8/2025 1941)  Prevent/minimize sheer/friction injuries: Increase activity/out of bed for meals  Goal: Promote/optimize nutrition  Outcome: Progressing  Flowsheets (Taken 8/8/2025 1941)  Promote/optimize nutrition:   Offer water/supplements/favorite foods   Consume > 50% meals/supplements   Monitor/record intake including meals   Assist with feeding  Goal: Promote skin healing  Outcome: Progressing  Flowsheets (Taken 8/8/2025 1941)  Promote skin healing: Assess skin/pad under line(s)/device(s)     Problem: Pain - Adult  Goal: Verbalizes/displays adequate comfort level or baseline comfort level  Outcome: Progressing     Problem: Safety - Adult  Goal: Free from fall injury  Outcome: Progressing     Problem: Discharge Planning  Goal: Discharge to home or other facility with appropriate resources  Outcome: Progressing     Problem: Chronic Conditions and Co-morbidities  Goal: Patient's chronic conditions and co-morbidity symptoms are monitored and maintained or improved  Outcome: Progressing     Problem: Nutrition  Goal: Nutrient intake appropriate for maintaining nutritional needs  Outcome: Progressing     Problem: Diabetes  Goal: Achieve decreasing blood glucose levels by end of  shift  Outcome: Progressing  Goal: Increase stability of blood glucose readings by end of shift  Outcome: Progressing  Goal: Decrease in ketones present in urine by end of shift  Outcome: Progressing  Goal: Maintain electrolyte levels within acceptable range throughout shift  Outcome: Progressing  Goal: Maintain glucose levels >70mg/dl to <250mg/dl throughout shift  Outcome: Progressing  Goal: No changes in neurological exam by end of shift  Outcome: Progressing  Goal: Learn about and adhere to nutrition recommendations by end of shift  Outcome: Progressing  Goal: Vital signs within normal range for age by end of shift  Outcome: Progressing  Goal: Increase self care and/or family involovement by end of shift  Outcome: Progressing  Goal: Receive DSME education by end of shift  Outcome: Progressing     Problem: General Stroke  Goal: Establish a mutual long term goal with patient by discharge  Outcome: Progressing  Goal: Demonstrate improvement in neurological exam throughout the shift  Outcome: Progressing  Goal: Maintain BP within ordered limits throughout shift  Outcome: Progressing  Goal: Participate in treatment (ie., meds, therapy) throughout shift  Outcome: Progressing  Goal: No symptoms of aspiration throughout shift  Outcome: Progressing  Goal: No symptoms of hemorrhage throughout shift  Outcome: Progressing  Goal: Decreased nausea/vomiting throughout shift  Outcome: Progressing  Goal: Controlled blood glucose throughout shift  Outcome: Progressing  Goal: Out of bed three times today  Outcome: Progressing      The clinical goals for the shift include Pt will maintain an adequate comfort level throughout the shift

## 2025-08-08 NOTE — PROGRESS NOTES
"Kaitlin Sarkar is a 89 y.o. female on day 3 of admission presenting with Delirium.    Subjective   Patient seen awake in chair in room. She reports eating well, no complaints at this time. No reported overnight events by nursing staff.     ROS:  Constitutional: No fever, no chills, no fatigue  HEENT: No headache, no vision changes, no hearing loss, no nasal congestion  Respiratory: No cough, no SOB  Cardiac: No chest pain, no palpitations, no swelling of limbs  GI: No nausea, no vomiting, no diarrhea  Musculoskeletal: No back pain, no myalgia  Neuro: No seizures, no dizziness, no lightheadedness  Heme: No easy bruising, no bleeding  Genitourinary:  Pelvic pain. No Urgency       Objective     Physical Exam  Gen: Elderly adult female , NAD, age appropriate.  HEENT: Normocephalic, atraumatic, good dentition, no oral lesions appreciated  Neck: No cervical lymphadenopathy appreciated, no thyroid enlargement appreciated  CV: No limb edema appreciated  Resp: No increased work of breathing, no wheezes appreciated  Abdomen: nondistended, llanes catheter with pink urine in bag  MSK: No joint swelling appreciated  Neuro: awake, alert, answering questions, unclear if oriented  Psych: appropriate mood and affect      Last Recorded Vitals  Blood pressure 149/90, pulse 105, temperature 36.2 °C (97.2 °F), temperature source Temporal, resp. rate 18, height 1.575 m (5' 2.01\"), weight 46.7 kg (102 lb 15.3 oz), SpO2 96%.  Intake/Output last 3 Shifts:  I/O last 3 completed shifts:  In: 400 (8.6 mL/kg) [P.O.:400]  Out: 900 (19.3 mL/kg) [Urine:900 (0.5 mL/kg/hr)]  Weight: 46.7 kg       Relevant Results  Scheduled medications  Scheduled Medications[1]  Continuous medications  Continuous Medications[2]  PRN medications  PRN Medications[3]    Results for orders placed or performed during the hospital encounter of 08/05/25 (from the past 24 hours)   POCT GLUCOSE   Result Value Ref Range    POCT Glucose 110 (H) 74 - 99 mg/dL   Ammonia   Result " Value Ref Range    Ammonia 42 16 - 53 umol/L   Vitamin B12   Result Value Ref Range    Vitamin B12 >2,000 (H) 211 - 911 pg/mL   Magnesium   Result Value Ref Range    Magnesium 1.77 1.60 - 2.40 mg/dL   Renal Function Panel   Result Value Ref Range    Glucose 128 (H) 74 - 99 mg/dL    Sodium 136 136 - 145 mmol/L    Potassium 3.8 3.5 - 5.3 mmol/L    Chloride 104 98 - 107 mmol/L    Bicarbonate 21 21 - 32 mmol/L    Anion Gap 15 10 - 20 mmol/L    Urea Nitrogen 34 (H) 6 - 23 mg/dL    Creatinine 1.34 (H) 0.50 - 1.05 mg/dL    eGFR 37 (L) >60 mL/min/1.73m*2    Calcium 7.7 (L) 8.6 - 10.3 mg/dL    Phosphorus 2.3 (L) 2.5 - 4.9 mg/dL    Albumin 3.4 3.4 - 5.0 g/dL   CBC   Result Value Ref Range    WBC 12.2 (H) 4.4 - 11.3 x10*3/uL    nRBC 0.0 0.0 - 0.0 /100 WBCs    RBC 3.64 (L) 4.00 - 5.20 x10*6/uL    Hemoglobin 12.5 12.0 - 16.0 g/dL    Hematocrit 35.7 (L) 36.0 - 46.0 %    MCV 98 80 - 100 fL    MCH 34.3 (H) 26.0 - 34.0 pg    MCHC 35.0 32.0 - 36.0 g/dL    RDW 13.0 11.5 - 14.5 %    Platelets 170 150 - 450 x10*3/uL       Imaging  MR brain wo IV contrast  Addendum Date: 8/6/2025  Interpreted By:  Shad Bryan, ADDENDUM: A subcentimeter focus of diffusion restriction is also noted in the expected location of the right motor strip at the vertex. Finding is consistent with acute infarction in the distribution of the right middle cerebral artery.   Signed by: Shad Bryan 8/6/2025 3:35 PM   -------- ORIGINAL REPORT -------- Dictation workstation:   [id number]    Addendum Date: 8/6/2025  Interpreted By:  Shad Bryan, ADDENDUM: Shad Bryan discussed the significance and urgency of this critical finding by EPIC secure chat with  MIKAYLA SERNA; JULIA WONG on 8/6/2025 at 12:08 pm.  (**-RCF-**) Findings:  See findings.     Signed by: Shad Bryan 8/6/2025 12:08 PM   -------- ORIGINAL REPORT -------- Dictation workstation:   [id number]    Result Date: 8/6/2025  Acute/subacute infarction in the distribution of the  posterior division of the left middle cerebral artery. No associated hemorrhage or mass effect Abundant foci of hemosiderin deposition especially in the right hemisphere but also within the left hemisphere and midbrain of uncertain cause and significance. Consider amyloid angiopathy MRA of the carotid bifurcations is within normal limits. There is a paucity of distal branches of the right middle cerebral artery which could be artifactual There is no evidence of intracranial aneurysm or vascular malformation.   MACRO: none   Signed by: Shad Bryan 8/6/2025 11:30 AM Dictation workstation:   [id number]    MR angio head wo IV contrast  Addendum Date: 8/6/2025  Interpreted By:  Shad Bryan, ADDENDUM: A subcentimeter focus of diffusion restriction is also noted in the expected location of the right motor strip at the vertex. Finding is consistent with acute infarction in the distribution of the right middle cerebral artery.   Signed by: Shad Bryan 8/6/2025 3:35 PM   -------- ORIGINAL REPORT -------- Dictation workstation:   [id number]    Addendum Date: 8/6/2025  Interpreted By:  Shad Bryan, ADDENDUM: Shad Bryan discussed the significance and urgency of this critical finding by EPIC secure chat with  MIKAYLA SERNA; JULIA WONG on 8/6/2025 at 12:08 pm.  (**-RCF-**) Findings:  See findings.     Signed by: Shad Bryan 8/6/2025 12:08 PM   -------- ORIGINAL REPORT -------- Dictation workstation:   [id number]    Result Date: 8/6/2025  Acute/subacute infarction in the distribution of the posterior division of the left middle cerebral artery. No associated hemorrhage or mass effect Abundant foci of hemosiderin deposition especially in the right hemisphere but also within the left hemisphere and midbrain of uncertain cause and significance. Consider amyloid angiopathy MRA of the carotid bifurcations is within normal limits. There is a paucity of distal branches of the right middle cerebral  artery which could be artifactual There is no evidence of intracranial aneurysm or vascular malformation.   MACRO: none   Signed by: Shad Bryan 8/6/2025 11:30 AM Dictation workstation:   [id number]    MR angio neck wo IV contrast  Addendum Date: 8/6/2025  Interpreted By:  Shad Bryan, ADDENDUM: A subcentimeter focus of diffusion restriction is also noted in the expected location of the right motor strip at the vertex. Finding is consistent with acute infarction in the distribution of the right middle cerebral artery.   Signed by: Shad Bryan 8/6/2025 3:35 PM   -------- ORIGINAL REPORT -------- Dictation workstation:   [id number]    Addendum Date: 8/6/2025  Interpreted By:  Shad Bryan, ADDENDUM: Shad Bryan discussed the significance and urgency of this critical finding by EPIC secure chat with  MIKAYLA SERNA; JULIA WONG on 8/6/2025 at 12:08 pm.  (**-RCF-**) Findings:  See findings.     Signed by: Shad Bryan 8/6/2025 12:08 PM   -------- ORIGINAL REPORT -------- Dictation workstation:   [id number]    Result Date: 8/6/2025  Acute/subacute infarction in the distribution of the posterior division of the left middle cerebral artery. No associated hemorrhage or mass effect Abundant foci of hemosiderin deposition especially in the right hemisphere but also within the left hemisphere and midbrain of uncertain cause and significance. Consider amyloid angiopathy MRA of the carotid bifurcations is within normal limits. There is a paucity of distal branches of the right middle cerebral artery which could be artifactual There is no evidence of intracranial aneurysm or vascular malformation.   MACRO: none   Signed by: Shad Bryan 8/6/2025 11:30 AM Dictation workstation:   [id number]    US renal complete  Result Date: 8/5/2025  Moderate right hydronephrosis and mild left pelvicaliectasis are noted in the context of a very full urinary bladder. No postvoid imaging was obtained.    Renal parenchymal echogenicity is increased bilaterally consistent with medical renal disease   MACRO: None   Signed by: Dariana Chowdhury 8/5/2025 3:14 PM Dictation workstation:   [id number]    CT head wo IV contrast  Result Date: 8/5/2025  1.  Stable appearance 5 mm round focus of hypoattenuation anterior right frontal lobe, without evidence of edema or mass effect. This likely represents an unchanged parenchymal hemorrhage or a cavernoma. 2. Chronic microvascular ischemia and involutional changes.     Signed by: Davian Odom 8/5/2025 9:07 AM Dictation workstation:   [id number]    CT head wo IV contrast  Result Date: 8/5/2025  Punctate focus of increased density in the right frontal lobe not definitively seen on prior imaging, which may represent a small focus of hemorrhage. Consider interval CT follow-up in 4 hours to assess stability. No mass effect or midline shift.   Nonspecific scattered white matter hypodensities favored to represent sequela of small vessel ischemia.       MACRO: Evan Finkelstein discussed the significance and urgency of this critical finding by EPIC secure chat with  IRAIS PALACIOS on 8/5/2025 at 4:41 am.  (**-RCF-**) Findings:  See findings.   .   Signed by: Evan Finkelstein 8/5/2025 4:41 AM Dictation workstation:   [id number]    XR chest 1 view  Result Date: 8/5/2025  1.  No definite evidence of acute cardiopulmonary process. Similar cardiomegaly.       MACRO: None   Signed by: Ashish Sauceda 8/5/2025 2:53 AM Dictation workstation:   [id number]      Cardiology, Vascular, and Other Imaging  Transthoracic Echo Complete  Result Date: 8/6/2025              [place] [address]      Phone [phone] Fax [phone] TRANSTHORACIC ECHOCARDIOGRAM REPORT Patient Name:       WESLEY Villarreal Physician:    [id number] Gallito Dumont MD Study Date:         8/6/2025              Ordering Provider:    [id number] ANGELA TANG MRN/PID:            [id number]             Fellow: Accession#:         [id number]         Nurse: Date of Birth/Age:  10/18/1930 / 89      Sonographer:          Zahra Brennan RDCS                     years Gender Assigned at  F                    Additional Staff: Birth: Height:             152.40 cm            Admit Date:           8/5/2025 Weight:             51.26 kg             Admission Status:     Inpatient -                                                                Routine BSA / BMI:          1.46 m2 / 22.07      Department Location:  [facility]                     kg/m2 Blood Pressure: 153 /84 mmHg Study Type:    TRANSTHORACIC ECHO (TTE) COMPLETE Diagnosis/ICD: Transient cerebral ischemic attack, unspecified (NOT on                LCD)-G45.9 Indication:    Stroke CPT Codes:     Echo Complete w Full Doppler-[id number]  Study Detail: The following Echo studies were performed: 2D, M-Mode, Doppler and               color flow. Patient's heart rhythm is atrial fibrillation.  PHYSICIAN INTERPRETATION: Left Ventricle: The left ventricular systolic function is low normal with a visually estimated ejection fraction of 55%. There are no regional wall motion abnormalities. The left ventricular cavity size is normal. There is mild increased septal and normal posterior left ventricular wall thickness. Left ventricular diastolic filling was indeterminate. Left Atrium: The left atrial size is mildly dilated. Right Ventricle: The right ventricle is normal in size. There is normal right ventricular global systolic function. Right Atrium: The right atrial size is mildly dilated. Aortic Valve: The aortic valve is trileaflet. There is mild aortic valve cusp calcification. There is no evidence of aortic valve stenosis. There is mild aortic valve regurgitation. Mitral Valve: The mitral valve is mildly thickened. There is mild  to moderate mitral valve regurgitation. The E Vmax is 0.91 m/s. Tricuspid Valve: The tricuspid valve is structurally normal. There is moderate tricuspid regurgitation. The Doppler estimated right ventricular systolic pressure (RVSP) is moderately elevated at 58 mmHg. Pulmonic Valve: The pulmonic valve is structurally normal. There is mild pulmonic valve regurgitation. Pericardium: Trivial to small pericardial effusion. There is no evidence of cardiac tamponade. Aorta: The aortic root is normal. Systemic Veins: The inferior vena cava appears normal in size, with IVC inspiratory collapse less than 50%.  CONCLUSIONS:  1. The left ventricular systolic function is low normal with a visually estimated ejection fraction of 55%.  2. Left ventricular diastolic filling was indeterminate.  3. There is normal right ventricular global systolic function.  4. Trivial to small pericardial effusion.  5. There is no evidence of cardiac tamponade.  6. Mild to moderate mitral valve regurgitation.  7. Moderate tricuspid regurgitation.  8. The Doppler estimated RVSP is moderately elevated at 58 mmHg.  9. Mild aortic valve regurgitation. QUANTITATIVE DATA SUMMARY:  2D MEASUREMENTS:             Normal Ranges: IVSd:            1.02 cm     (0.6-1.1cm) LVPWd:           0.72 cm     (0.6-1.1cm) LVIDd:           4.32 cm     (3.9-5.9cm) LVIDs:           2.51 cm LV Mass Index:   80.8 g/m2 LVEDV Index:     37.13 ml/m2 LV % FS          42.0 %  LEFT ATRIUM:                  Normal Ranges: LA Vol A4C:        78.2 ml    (22+/-6mL/m2) LA Vol A2C:        48.5 ml LA Vol BP:         62.6 ml LA Vol Index A4C:  53.4 ml/m2 LA Vol Index A2C:  33.1 ml/m2 LA Vol Index BP:   42.8 ml/m2 LA Area A4C:       23.1 cm2 LA Area A2C:       18.5 cm2 LA Major Axis A4C: 5.8 cm LA Major Axis A2C: 6.0 cm LA Volume Index:   42.4 ml/m2 LA Vol A4C:        73.4 ml LA Vol A2C:        45.9 ml LA Vol Index BSA:  40.7 ml/m2  RIGHT ATRIUM:          Normal Ranges: RA Area A4C:  20.4  cm2  M-MODE MEASUREMENTS:         Normal Ranges: Ao Root:             2.50 cm (2.0-3.7cm) AoV Exc:             1.60 cm (1.5-2.5cm) LAs:                 3.71 cm (2.7-4.0cm)  AORTA MEASUREMENTS:         Normal Ranges: AoV Exc:            1.60 cm (1.5-2.5cm) Ao Sinus, d:        2.80 cm (2.1-3.5cm) Ao STJ, d:          2.20 cm (1.7-3.4cm) Asc Ao, d:          2.70 cm (2.1-3.4cm)  LV SYSTOLIC FUNCTION:                      Normal Ranges: EF-A4C View:    53 % (>=55%) EF-A2C View:    45 % EF-Biplane:     51 % EF-Visual:      55 % LV EF Reported: 55 %  LV DIASTOLIC FUNCTION:           Normal Ranges: MV Peak E:             0.91 m/s  (0.7-1.2 m/s) MV Peak A:             0.44 m/s  (0.42-0.7 m/s) E/A Ratio:             2.08      (1.0-2.2) MV e'                  0.063 m/s (>8.0) MV lateral e'          0.07 m/s MV medial e'           0.06 m/s E/e' Ratio:            14.43     (<8.0)  MITRAL VALVE:          Normal Ranges: MV DT:        166 msec (150-240msec)  AORTIC VALVE:           Normal Ranges: LVOT Max Devendra:  0.97 m/s (<=1.1m/s) LVOT VTI:      13.74 cm LVOT Diameter: 1.88 cm  (1.8-2.4cm)  RIGHT VENTRICLE: RV Basal 3.83 cm RV Mid   3.00 cm RV Major 6.2 cm TAPSE:   18.5 mm RV s'    0.13 m/s  TRICUSPID VALVE/RVSP:          Normal Ranges: Peak TR Velocity:     3.55 m/s Est. RA Pressure:     8 mmHg RV Syst Pressure:     58 mmHg  (< 30mmHg) IVC Diam:             1.90 cm  AORTA: Asc Ao Diam 2.74 cm  [id number] Gallito Dumont MD Electronically signed on 8/6/2025 at 5:16:57 PM  ** Final **     ECG 12 lead  Result Date: 8/5/2025  Atrial fibrillation Right bundle branch block Nonspecific T abnormalities, lateral leads                               Assessment/Plan   Assessment & Plan        Acute cerebral infarction  MRI of the brain showed bilateral cerebral infarction significant for left MCA distribution.  Likely embolic.  Anticoagulation on hold for another 7 days to prevent hemorrhagic transformation.  Continue atorvastatin   Holding  aspirin  Echocardiogram EF 55%, otherwise unremarkable  S/p permissive hypertension for 48h  PT/OT following  Neurology consulted, continue supportive care and holding AC as above    Multiple hemosiderin foci for?  Intracranial hemorrhage  Right frontal lobe,  Possible area of hemorrhage  Continue to hold anticoagulation for another 7 days  Neurologist recommendations appreciated  ED d/w neurosurgery felt to be stable  PT/OT eval  Monitor neuro status  Follow up with neurosurgery as outpt     Acute encephalopathy  Possibly multifactorial from CVA, intracranial hemorrhage and MITUL  Anticoagulation on hold  Less likely due to UTI  Will discontinue antibiotics for now  TSH pending  ammonia 42, vitamin B12 elevated  Trend BMP     Urinary retention and right hydronephrosis  U/S shows moderate R hydronephrosis and mild L pelvicaliectasis. Renal parenchymal echogenicity is increased bilaterally consistent  with medical renal disease  Urologist consulted, continue llanes and plan for voiding trial, outpatient followup  Started tamsulosin 0.4mg every day  Continiue llanes for now, plan void trial tomorrow      Atrial fibrillation  Currently rate controlled  Anticoagulation on hold due to intracranial hemorrhage and stroke prophy prevent hemorrhagic transformation      HTN  Continue home medications              I spent 50 minutes in the professional and overall care of this patient. Discussed care with daughter at bedside and discussed with nurse and .      Curry Metcalf MD               [1] [Held by provider] apixaban, 2.5 mg, oral, BID  ascorbic acid, 500 mg, oral, Daily  atorvastatin, 40 mg, oral, Nightly  cholecalciferol, 25 mcg, oral, Daily  cyanocobalamin, 1,000 mcg, oral, Daily  ferrous sulfate, 1 tablet, oral, Daily with breakfast  levothyroxine, 125 mcg, oral, Daily  losartan, 25 mg, oral, Daily  metoprolol tartrate, 50 mg, oral, BID  pantoprazole, 40 mg, oral, Daily before breakfast   Or  pantoprazole,  40 mg, intravenous, Daily before breakfast  polyethylene glycol, 17 g, oral, Daily  tamsulosin, 0.4 mg, oral, Daily     [2]    [3] PRN medications: acetaminophen **OR** acetaminophen **OR** acetaminophen, bisacodyl, guaiFENesin, melatonin, ondansetron **OR** ondansetron, oxygen

## 2025-08-08 NOTE — CARE PLAN
Problem: Skin  Goal: Decreased wound size/increased tissue granulation at next dressing change  Outcome: Progressing  Flowsheets (Taken 8/8/2025 0238)  Decreased wound size/increased tissue granulation at next dressing change: Promote sleep for wound healing  Goal: Participates in plan/prevention/treatment measures  Outcome: Progressing  Flowsheets (Taken 8/8/2025 0238)  Participates in plan/prevention/treatment measures:   Elevate heels   Increase activity/out of bed for meals  Goal: Prevent/manage excess moisture  Outcome: Progressing  Flowsheets (Taken 8/8/2025 0238)  Prevent/manage excess moisture:   Cleanse incontinence/protect with barrier cream   Monitor for/manage infection if present  Goal: Prevent/minimize sheer/friction injuries  Outcome: Progressing  Flowsheets (Taken 8/8/2025 0238)  Prevent/minimize sheer/friction injuries: Increase activity/out of bed for meals  Goal: Promote/optimize nutrition  Outcome: Progressing  Flowsheets (Taken 8/8/2025 0238)  Promote/optimize nutrition:   Assist with feeding   Monitor/record intake including meals  Goal: Promote skin healing  Outcome: Progressing  Flowsheets (Taken 8/8/2025 0238)  Promote skin healing: Assess skin/pad under line(s)/device(s)     Problem: Pain - Adult  Goal: Verbalizes/displays adequate comfort level or baseline comfort level  Outcome: Progressing     Problem: Safety - Adult  Goal: Free from fall injury  Outcome: Progressing     Problem: Discharge Planning  Goal: Discharge to home or other facility with appropriate resources  Outcome: Progressing     Problem: Chronic Conditions and Co-morbidities  Goal: Patient's chronic conditions and co-morbidity symptoms are monitored and maintained or improved  Outcome: Progressing     Problem: Nutrition  Goal: Nutrient intake appropriate for maintaining nutritional needs  Outcome: Progressing     Problem: Diabetes  Goal: Achieve decreasing blood glucose levels by end of shift  Outcome: Progressing  Goal:  Increase stability of blood glucose readings by end of shift  Outcome: Progressing  Goal: Decrease in ketones present in urine by end of shift  Outcome: Progressing  Goal: Maintain electrolyte levels within acceptable range throughout shift  Outcome: Progressing  Goal: Maintain glucose levels >70mg/dl to <250mg/dl throughout shift  Outcome: Progressing  Goal: No changes in neurological exam by end of shift  Outcome: Progressing  Goal: Learn about and adhere to nutrition recommendations by end of shift  Outcome: Progressing  Goal: Vital signs within normal range for age by end of shift  Outcome: Progressing  Goal: Increase self care and/or family involovement by end of shift  Outcome: Progressing  Goal: Receive DSME education by end of shift  Outcome: Progressing     Problem: General Stroke  Goal: Establish a mutual long term goal with patient by discharge  Outcome: Progressing  Goal: Demonstrate improvement in neurological exam throughout the shift  Outcome: Progressing  Goal: Maintain BP within ordered limits throughout shift  Outcome: Progressing  Goal: Participate in treatment (ie., meds, therapy) throughout shift  Outcome: Progressing  Goal: No symptoms of aspiration throughout shift  Outcome: Progressing  Goal: No symptoms of hemorrhage throughout shift  Outcome: Progressing  Goal: Decreased nausea/vomiting throughout shift  Outcome: Progressing  Goal: Controlled blood glucose throughout shift  Outcome: Progressing  Goal: Out of bed three times today  Outcome: Progressing      The clinical goals for the shift include Pt will maintain stable neurological assessments throughout the shift.

## 2025-08-08 NOTE — PROGRESS NOTES
Pt's daughter Alma at bedside. Alma provided SNF choices #1 [facility], #2 [facility], and #3 [facility]. IMM provided. Referrals sent. No auth needed. 3 midnights met for insurance. TCC answered all questions and concerns. TCC following.

## 2025-08-08 NOTE — PROGRESS NOTES
Occupational Therapy    OT Treatment    Patient Name: Kaitlin Sarkar  MRN: [id number]  Today's Date: 8/8/2025  Time Calculation  Start Time: 1407  Stop Time: 1442  Time Calculation (min): 35 min       2028/2028-A    Assessment:  Evaluation/Treatment Tolerance: Patient limited by fatigue, Other (Comment) (pt lethargic)  Medical Staff Made Aware: Yes  End of Session Communication: Bedside nurse  End of Session Patient Position: Up in chair, Alarm off, caregiver present  OT Assessment Results: Decreased ADL status, Decreased upper extremity range of motion, Decreased upper extremity strength, Decreased endurance, Decreased functional mobility  Evaluation/Treatment Tolerance: Patient limited by fatigue, Other (Comment) (pt lethargic)  Medical Staff Made Aware: Yes    Plan:  Treatment Interventions: Functional transfer training, UE strengthening/ROM, Endurance training, Patient/family training  OT Recommended Transfer Status: Dependent, Assist of 2  OT - OK to Discharge: Yes (when medically stable)  Treatment Interventions: Functional transfer training, UE strengthening/ROM, Endurance training, Patient/family training  Subjective     Current Problem:  Problem List[1]    General:  OT Received On: 08/08/25  Co-Treatment: PT  Co-Treatment Reason: due to pt fx le3vel and safety  Prior to Session Communication: Bedside nurse  Patient Position Received: Up in chair, Alarm off, caregiver present           Pain:  Pain Assessment  Pain Assessment: 0-10  0-10 (Numeric) Pain Score: 0 - No pain  Objective      Activities of Daily Living:       UE Dressing  UE Dressing Level of Assistance: Maximum assistance, pt unable to place arms into sleeves.          Functional Standing Tolerance:  Time: 3 trials 30 seconds  Functional Standing Tolerance Comments: poor, pt unable to stand withyout max Assist of 2    Bed Mobility/Transfers:    Transfers  Transfer: Yes  Transfer 1  Technique 1: Sit to stand, Stand to sit  Transfer Device 1:  Walker  Transfer Level of Assistance 1: Dependent, Maximum verbal cues, Maximum tactile cues, +2                Therapy/Activity: Therapeutic Exercise  Therapeutic Exercise Performed: Yes  Therapeutic Exercise Activity 1: IRENE BRANDON  Therapeutic Activity  Therapeutic Activity Performed: Yes  Therapeutic Activity 1: static standing     Strength: very weak no active movements LUE       Outcome Measures:[facility] Daily Activity  Putting on and taking off regular lower body clothing: Total  Bathing (including washing, rinsing, drying): Total  Putting on and taking off regular upper body clothing: Total  Toileting, which includes using toilet, bedpan or urinal: Total  Taking care of personal grooming such as brushing teeth: A lot  Eating Meals: A lot  Daily Activity - Total Score: 8  Education Documentation  Mobility Training, taught by DE Little at 8/8/2025  4:13 PM.  Learner: Patient  Readiness: Acceptance  Method: Explanation, Demonstration  Response: Needs Reinforcement, No Evidence of Learning    Body Mechanics, taught by DE Little at 8/8/2025  4:13 PM.  Learner: Patient  Readiness: Acceptance  Method: Explanation, Demonstration  Response: Needs Reinforcement, No Evidence of Learning    Precautions, taught by DE Little at 8/8/2025  4:13 PM.  Learner: Patient  Readiness: Acceptance  Method: Explanation, Demonstration  Response: Needs Reinforcement, No Evidence of Learning    ADL Training, taught by DE Little at 8/8/2025  4:13 PM.  Learner: Patient  Readiness: Acceptance  Method: Explanation, Demonstration  Response: Needs Reinforcement, No Evidence of Learning    Education Comments  No comments found.           EDUCATION:       Goals:  Encounter Problems       Encounter Problems (Active)       ADLs       Patient will feed self with minimal assist  level of assistance and verbal cues and tactile cues using PRN adaptive equipment. (Progressing)       Start:   08/07/25            Patient will complete daily grooming tasks washing face/brushing hair with minimal assist  level of assistance and PRN adaptive equipment while supported sitting. (Progressing)       Start:  08/07/25               BALANCE       Patient will maintain static standing balance fro 5 mins during ADL task with minimal assist  level of assistance in order to demonstrate decreased risk of falling and improved postural control. (Progressing)       Start:  08/07/25               EXERCISE/STRENGTHENING       Patient will be educated on BUE HEP for increased ADL performance. (Progressing)       Start:  08/07/25    Expected End:  08/21/25               MOBILITY       Patient will perform functional mobility min household distances with minimal assist  level of assistance and least restrictive device in order to improve safety and functional mobility. (Progressing)       Start:  08/07/25    Expected End:  08/21/25               TRANSFERS       Patient will complete functional transfers with least restrictive device with minimal assist  level of assistance. (Progressing)       Start:  08/07/25    Expected End:  08/21/25                               [1]   Patient Active Problem List  Diagnosis    Chronic atrial fibrillation (Multi)    Type 2 diabetes mellitus    Altered mental status    Generalized weakness    Atrial fibrillation with rapid ventricular response (Multi)    Delirium

## 2025-08-08 NOTE — PROGRESS NOTES
Physical Therapy    Physical Therapy Treatment    Patient Name: Kaitlin Sarkar  MRN: [id number]  Department: [address]  Room: 2028/2028-A  Today's Date: 8/8/2025  Time Calculation  Start Time: 1415  Stop Time: 1441  Time Calculation (min): 26 min    Assessment/Plan   PT Assessment  Barriers to Discharge Home: Caregiver assistance, Cognition needs, Physical needs  End of Session Communication: Bedside nurse  Assessment Comment: pt offered minimal effort for activity and fatigued rapidly requiring constant motivation and cueing. pt exhibited a forward flexed posture while holding knees in flexion and hips posteriorly, when pt given assist to stand tall (shifting hips forward while bringing shoudlers back) knee flexion increased  End of Session Patient Position: Up in chair, Alarm on     PT Plan  Treatment/Interventions: Bed mobility, Transfer training, Gait training, Neuromuscular re-education, Therapeutic exercise, Therapeutic activity  PT Plan: Ongoing PT  PT Frequency: 5 times per week (during this acute adm)  PT Discharge Recommendations: Moderate intensity level of continued care, Other (comment) (Based on current functional status and rehab potential, patient is anticipated to tolerate and benefit from 5 or more days per week of skilled rehabilitative therapy after discharge from this acute inpatient hospitalization.)  Equipment Recommended upon Discharge: Wheelchair, Other (comment) (bedrails. recomm dtr borrow or rent a wheelchair for initial mobility, anticipate pt will needed extended rehab before able to amb well)  PT - OK to Discharge: Yes (to next level of care)    PT Visit Info:  PT Received On: 08/08/25     General Visit Information:   General  Co-Treatment: OT  Co-Treatment Reason: optimize pt safety while focus on discipline specific goals  Prior to Session Communication: Bedside nurse  Patient Position Received: Up in chair, Alarm on  General Comment: pt lethargic but arouses easily to name    Subjective    Precautions:  Precautions  Medical Precautions: Fall precautions     Date/Time Vitals Session Patient Position Pulse Resp SpO2 BP MAP (mmHg)    08/08/25 1517 --  --  105  18  96 %  149/90  109      Objective   Pain:  Pain Assessment  Pain Assessment: 0-10  0-10 (Numeric) Pain Score: 0 - No pain  Cognition:  Cognition  Orientation Level: Disoriented to place, Disoriented to time, Disoriented to situation    Activity Tolerance:  Activity Tolerance  Endurance: Tolerates 10 - 20 min exercise with multiple rests  Treatments:  Therapeutic Exercise  Therapeutic Exercise Performed: Yes  Therapeutic Exercise Activity 1: pt completed seated LE exercises with Mod A: LAQ x 10 reps, hip flexion x 10 reps, hip abduction x 10 reps, hip adduction x 10 reps; pt required consistent verbal and tactile cues to continues exercises    Therapeutic Activity  Therapeutic Activity Performed: Yes  Therapeutic Activity 1: pt completed 3 static stands for 30 seconds with Max Ax2 to maintain    Transfers  Transfer: Yes  Transfer 1  Technique 1: Sit to stand, Stand to sit  Transfer Device 1: Walker  Transfer Level of Assistance 1: Maximum assistance, +2, Moderate verbal cues, Moderate tactile cues  Trials/Comments 1: cues for hand placement/sequencing; upon standing pt would give minimal effort to lift hands up to FWW requiring Max a to do so    Outcome Measures:  [facility] Basic Mobility  Turning from your back to your side while in a flat bed without using bedrails: A lot  Moving from lying on your back to sitting on the side of a flat bed without using bedrails: A lot  Moving to and from bed to chair (including a wheelchair): A lot  Standing up from a chair using your arms (e.g. wheelchair or bedside chair): A lot  To walk in hospital room: Total  Climbing 3-5 steps with railing: Total  Basic Mobility - Total Score: 10    Education Documentation  Mobility Training, taught by Huyen Miller PTA at 8/8/2025  4:28 PM.  Learner: Patient  Readiness:  Acceptance  Method: Explanation  Response: Needs Reinforcement    Education Comments  No comments found.        OP EDUCATION:       Encounter Problems       Encounter Problems (Active)       Mobility       STG - Patient will ambulate household distance using WALKER (instead of cane used at baseline) with no more than min Ax1 so that dtr can manage pt at home (Not Progressing)       Start:  08/06/25    Expected End:  08/20/25            STG - Patient will navigate 2 steps with rails/device and no more than MOD Ax1 to enter/exit home with dtr assist  (Not Progressing)       Start:  08/06/25    Expected End:  08/20/25               PT Transfers       STG - Transfer from bed to chair/BSC using WALKER (instead of cane used at baseline) with no more than min Ax1 so that dtr can manage pt at home (Progressing)       Start:  08/06/25    Expected End:  08/20/25               Strengthening        Pt will perform 10+ reps AAROM/AROM/RROM BLE to improve functional strength needed for improved mobility.  (Progressing)       Start:  08/06/25    Expected End:  08/20/25

## 2025-08-08 NOTE — CARE PLAN
The patient's goals for the shift include      The clinical goals for the shift include Pt will remain compliant with care plan throughout shift.

## 2025-08-09 LAB
ATRIAL RATE: 85 BPM
PR INTERVAL: 119 MS
Q ONSET: 251 MS
QRS COUNT: 16 BEATS
QRS DURATION: 134 MS
QT INTERVAL: 376 MS
QTC CALCULATION(BAZETT): 460 MS
QTC FREDERICIA: 430 MS
R AXIS: -18 DEGREES
T AXIS: -40 DEGREES
T OFFSET: 439 MS
VENTRICULAR RATE: 90 BPM

## 2025-08-09 PROCEDURE — 2060000001 HC INTERMEDIATE ICU ROOM DAILY

## 2025-08-09 PROCEDURE — 2500000001 HC RX 250 WO HCPCS SELF ADMINISTERED DRUGS (ALT 637 FOR MEDICARE OP): Performed by: NURSE PRACTITIONER

## 2025-08-09 PROCEDURE — 2500000001 HC RX 250 WO HCPCS SELF ADMINISTERED DRUGS (ALT 637 FOR MEDICARE OP): Performed by: FAMILY MEDICINE

## 2025-08-09 PROCEDURE — 2500000002 HC RX 250 W HCPCS SELF ADMINISTERED DRUGS (ALT 637 FOR MEDICARE OP, ALT 636 FOR OP/ED): Performed by: NURSE PRACTITIONER

## 2025-08-09 PROCEDURE — 2500000002 HC RX 250 W HCPCS SELF ADMINISTERED DRUGS (ALT 637 FOR MEDICARE OP, ALT 636 FOR OP/ED): Performed by: FAMILY MEDICINE

## 2025-08-09 PROCEDURE — 99232 SBSQ HOSP IP/OBS MODERATE 35: CPT | Performed by: FAMILY MEDICINE

## 2025-08-09 RX ADMIN — Medication 25 MCG: at 09:33

## 2025-08-09 RX ADMIN — LOSARTAN POTASSIUM 25 MG: 25 TABLET, FILM COATED ORAL at 09:33

## 2025-08-09 RX ADMIN — METOPROLOL TARTRATE 50 MG: 50 TABLET, FILM COATED ORAL at 09:33

## 2025-08-09 RX ADMIN — ATORVASTATIN CALCIUM 40 MG: 40 TABLET, FILM COATED ORAL at 20:47

## 2025-08-09 RX ADMIN — OXYCODONE HYDROCHLORIDE AND ACETAMINOPHEN 500 MG: 500 TABLET ORAL at 09:33

## 2025-08-09 RX ADMIN — LEVOTHYROXINE SODIUM 125 MCG: 0.12 TABLET ORAL at 05:16

## 2025-08-09 RX ADMIN — METOPROLOL TARTRATE 50 MG: 50 TABLET, FILM COATED ORAL at 20:47

## 2025-08-09 RX ADMIN — PANTOPRAZOLE SODIUM 40 MG: 40 TABLET, DELAYED RELEASE ORAL at 05:16

## 2025-08-09 RX ADMIN — TAMSULOSIN HYDROCHLORIDE 0.4 MG: 0.4 CAPSULE ORAL at 09:33

## 2025-08-09 RX ADMIN — Medication 1000 MCG: at 09:33

## 2025-08-09 ASSESSMENT — PAIN SCALES - GENERAL
PAINLEVEL_OUTOF10: 0 - NO PAIN

## 2025-08-09 ASSESSMENT — PAIN SCALES - PAIN ASSESSMENT IN ADVANCED DEMENTIA (PAINAD)
CONSOLABILITY: NO NEED TO CONSOLE
BODYLANGUAGE: RELAXED
BREATHING: NORMAL
TOTALSCORE: 0
FACIALEXPRESSION: SMILING OR INEXPRESSIVE

## 2025-08-09 ASSESSMENT — COGNITIVE AND FUNCTIONAL STATUS - GENERAL
DRESSING REGULAR UPPER BODY CLOTHING: TOTAL
TURNING FROM BACK TO SIDE WHILE IN FLAT BAD: TOTAL
WALKING IN HOSPITAL ROOM: TOTAL
TOILETING: TOTAL
EATING MEALS: TOTAL
MOVING TO AND FROM BED TO CHAIR: TOTAL
MOVING FROM LYING ON BACK TO SITTING ON SIDE OF FLAT BED WITH BEDRAILS: TOTAL
HELP NEEDED FOR BATHING: TOTAL
DRESSING REGULAR LOWER BODY CLOTHING: TOTAL
STANDING UP FROM CHAIR USING ARMS: TOTAL
DAILY ACTIVITIY SCORE: 6
PERSONAL GROOMING: TOTAL
MOBILITY SCORE: 6
CLIMB 3 TO 5 STEPS WITH RAILING: TOTAL

## 2025-08-09 ASSESSMENT — PAIN - FUNCTIONAL ASSESSMENT
PAIN_FUNCTIONAL_ASSESSMENT: 0-10
PAIN_FUNCTIONAL_ASSESSMENT: PAINAD (PAIN ASSESSMENT IN ADVANCED DEMENTIA SCALE)
PAIN_FUNCTIONAL_ASSESSMENT: 0-10

## 2025-08-09 NOTE — PROGRESS NOTES
8/9/25 1259   08/09/25 1259   Discharge Planning   Home or Post Acute Services Post acute facilities (Rehab/SNF/etc)   Type of Post Acute Facility Services Skilled nursing   Expected Discharge Disposition SNF   Intensity of Service   Intensity of Service >30 min     Awaiting accepting SNF at this time. Attempted to call the facilities who have not responded. Awaiting call back.   Kami Huitron RN, BSN, [place]/ [place] Supervisor      Director, Crista(Attending)

## 2025-08-09 NOTE — PROGRESS NOTES
"Kaitlin Sarkar is a 89 y.o. female on day 4 of admission presenting with Delirium.    Subjective   Patient seen awake in bed. She reports eating well, no complaints at this time. Daughter reports patient is chewing slowly but is missing one set of dentures. No reported overnight events by nursing staff.     ROS:  Constitutional: No fever, no chills, no fatigue  HEENT: No headache, no vision changes, no hearing loss, no nasal congestion  Respiratory: No cough, no SOB  Cardiac: No chest pain, no palpitations, no swelling of limbs  GI: No nausea, no vomiting, no diarrhea  Musculoskeletal: No back pain, no myalgia  Neuro: No seizures, no dizziness, no lightheadedness  Heme: No easy bruising, no bleeding  Genitourinary:  Pelvic pain. No Urgency       Objective     Physical Exam  Gen: Elderly adult female , NAD, age appropriate.  HEENT: Normocephalic, atraumatic, no oral lesions appreciated  Neck: No cervical lymphadenopathy appreciated, no thyroid enlargement appreciated  CV: No limb edema appreciated  Resp: No increased work of breathing, no wheezes appreciated  Abdomen: nondistended, llanes catheter with pale pink urine in bag  MSK: No joint swelling appreciated  Neuro: awake, alert, answering questions, moves all limbs although apparent L sided upper extremity deficit  Psych: appropriate mood and affect      Last Recorded Vitals  Blood pressure 155/86, pulse 106, temperature 36.9 °C (98.4 °F), temperature source Temporal, resp. rate 18, height 1.575 m (5' 2.01\"), weight (!) 44.8 kg (98 lb 12.3 oz), SpO2 94%.  Intake/Output last 3 Shifts:  I/O last 3 completed shifts:  In: 652 (14.6 mL/kg) [P.O.:652]  Out: 1780 (39.7 mL/kg) [Urine:1780 (1.1 mL/kg/hr)]  Weight: 44.8 kg       Relevant Results  Scheduled medications  Scheduled Medications[1]  Continuous medications  Continuous Medications[2]  PRN medications  PRN Medications[3]    No results found for this or any previous visit (from the past 24 hours).      Imaging  MR " brain wo IV contrast  Addendum Date: 8/6/2025  Interpreted By:  Shad Bryan, ADDENDUM: A subcentimeter focus of diffusion restriction is also noted in the expected location of the right motor strip at the vertex. Finding is consistent with acute infarction in the distribution of the right middle cerebral artery.   Signed by: Shad Bryan 8/6/2025 3:35 PM   -------- ORIGINAL REPORT -------- Dictation workstation:   [id number]    Addendum Date: 8/6/2025  Interpreted By:  Shad Bryan, ADDENDUM: Shad Bryan discussed the significance and urgency of this critical finding by EPIC secure chat with  MIKAYLA SERNA; JULIA WONG on 8/6/2025 at 12:08 pm.  (**-RCF-**) Findings:  See findings.     Signed by: Shad Bryan 8/6/2025 12:08 PM   -------- ORIGINAL REPORT -------- Dictation workstation:   [id number]    Result Date: 8/6/2025  Acute/subacute infarction in the distribution of the posterior division of the left middle cerebral artery. No associated hemorrhage or mass effect Abundant foci of hemosiderin deposition especially in the right hemisphere but also within the left hemisphere and midbrain of uncertain cause and significance. Consider amyloid angiopathy MRA of the carotid bifurcations is within normal limits. There is a paucity of distal branches of the right middle cerebral artery which could be artifactual There is no evidence of intracranial aneurysm or vascular malformation.   MACRO: none   Signed by: Shad Bryan 8/6/2025 11:30 AM Dictation workstation:   [id number]    MR angio head wo IV contrast  Addendum Date: 8/6/2025  Interpreted By:  Shad Bryan, ADDENDUM: A subcentimeter focus of diffusion restriction is also noted in the expected location of the right motor strip at the vertex. Finding is consistent with acute infarction in the distribution of the right middle cerebral artery.   Signed by: Shad Bryan 8/6/2025 3:35 PM   -------- ORIGINAL REPORT --------  Dictation workstation:   [id number]    Addendum Date: 8/6/2025  Interpreted By:  Shad Bryan, ADDENDUM: Shad Bryan discussed the significance and urgency of this critical finding by EPIC secure chat with  MIKAYLA WONG on 8/6/2025 at 12:08 pm.  (**-RCF-**) Findings:  See findings.     Signed by: Shad Bryan 8/6/2025 12:08 PM   -------- ORIGINAL REPORT -------- Dictation workstation:   [id number]    Result Date: 8/6/2025  Acute/subacute infarction in the distribution of the posterior division of the left middle cerebral artery. No associated hemorrhage or mass effect Abundant foci of hemosiderin deposition especially in the right hemisphere but also within the left hemisphere and midbrain of uncertain cause and significance. Consider amyloid angiopathy MRA of the carotid bifurcations is within normal limits. There is a paucity of distal branches of the right middle cerebral artery which could be artifactual There is no evidence of intracranial aneurysm or vascular malformation.   MACRO: none   Signed by: Shad Bryan 8/6/2025 11:30 AM Dictation workstation:   [id number]    MR angio neck wo IV contrast  Addendum Date: 8/6/2025  Interpreted By:  Shad Bryan, ADDENDUM: A subcentimeter focus of diffusion restriction is also noted in the expected location of the right motor strip at the vertex. Finding is consistent with acute infarction in the distribution of the right middle cerebral artery.   Signed by: hSad Bryan 8/6/2025 3:35 PM   -------- ORIGINAL REPORT -------- Dictation workstation:   [id number]    Addendum Date: 8/6/2025  Interpreted By:  Shad Bryan, ADDENDUM: Shad Bryan discussed the significance and urgency of this critical finding by EPIC secure chat with  MIKAYLA WONG on 8/6/2025 at 12:08 pm.  (**-RCF-**) Findings:  See findings.     Signed by: Shad Bryan 8/6/2025 12:08 PM   -------- ORIGINAL REPORT -------- Dictation  workstation:   [id number]    Result Date: 8/6/2025  Acute/subacute infarction in the distribution of the posterior division of the left middle cerebral artery. No associated hemorrhage or mass effect Abundant foci of hemosiderin deposition especially in the right hemisphere but also within the left hemisphere and midbrain of uncertain cause and significance. Consider amyloid angiopathy MRA of the carotid bifurcations is within normal limits. There is a paucity of distal branches of the right middle cerebral artery which could be artifactual There is no evidence of intracranial aneurysm or vascular malformation.   MACRO: none   Signed by: Shad Bryan 8/6/2025 11:30 AM Dictation workstation:   [id number]     renal complete  Result Date: 8/5/2025  Moderate right hydronephrosis and mild left pelvicaliectasis are noted in the context of a very full urinary bladder. No postvoid imaging was obtained.   Renal parenchymal echogenicity is increased bilaterally consistent with medical renal disease   MACRO: None   Signed by: Dariana Piperus 8/5/2025 3:14 PM Dictation workstation:   [id number]    CT head wo IV contrast  Result Date: 8/5/2025  1.  Stable appearance 5 mm round focus of hypoattenuation anterior right frontal lobe, without evidence of edema or mass effect. This likely represents an unchanged parenchymal hemorrhage or a cavernoma. 2. Chronic microvascular ischemia and involutional changes.     Signed by: Davian Odom 8/5/2025 9:07 AM Dictation workstation:   [id number]    CT head wo IV contrast  Result Date: 8/5/2025  Punctate focus of increased density in the right frontal lobe not definitively seen on prior imaging, which may represent a small focus of hemorrhage. Consider interval CT follow-up in 4 hours to assess stability. No mass effect or midline shift.   Nonspecific scattered white matter hypodensities favored to represent sequela of small vessel ischemia.       MACRO: Evan Finkelstein  discussed the significance and urgency of this critical finding by EPIC secure chat with  IRAIS PALACIOS on 8/5/2025 at 4:41 am.  (**-RCF-**) Findings:  See findings.   .   Signed by: Evan Finkelstein 8/5/2025 4:41 AM Dictation workstation:   [id number]    XR chest 1 view  Result Date: 8/5/2025  1.  No definite evidence of acute cardiopulmonary process. Similar cardiomegaly.       MACRO: None   Signed by: Ashish Sauceda 8/5/2025 2:53 AM Dictation workstation:   [id number]      Cardiology, Vascular, and Other Imaging  ECG 12 lead  Result Date: 8/9/2025  Atrial fibrillation Right bundle branch block Nonspecific T abnormalities, lateral leads See ED provider note for full interpretation and clinical correlation Confirmed by Pearl Gatica ([id number]) on 8/9/2025 11:20:49 AM    Transthoracic Echo Complete  Result Date: 8/6/2025              [place] [address]      Phone [phone] Fax [phone] TRANSTHORACIC ECHOCARDIOGRAM REPORT Patient Name:       WESLEY SCOTTNILS Villarreal Physician:    [id number] Gallito Dumont MD Study Date:         8/6/2025             Ordering Provider:    [id number] TANG MRN/PID:            [id number]             Fellow: Accession#:         [id number]         Nurse: Date of Birth/Age:  10/18/1930 / 89      Sonographer:          Zahra Brennan RDCS                     years Gender Assigned at  F                    Additional Staff: Birth: Height:             152.40 cm            Admit Date:           8/5/2025 Weight:             51.26 kg             Admission Status:     Inpatient -                                                                Routine BSA / BMI:          1.46 m2 / 22.07      Department Location:  [facility]                     kg/m2 Blood Pressure: 153 /84 mmHg Study Type:    TRANSTHORACIC ECHO (TTE)  COMPLETE Diagnosis/ICD: Transient cerebral ischemic attack, unspecified (NOT on                LCD)-G45.9 Indication:    Stroke CPT Codes:     Echo Complete w Full Doppler-[id number]  Study Detail: The following Echo studies were performed: 2D, M-Mode, Doppler and               color flow. Patient's heart rhythm is atrial fibrillation.  PHYSICIAN INTERPRETATION: Left Ventricle: The left ventricular systolic function is low normal with a visually estimated ejection fraction of 55%. There are no regional wall motion abnormalities. The left ventricular cavity size is normal. There is mild increased septal and normal posterior left ventricular wall thickness. Left ventricular diastolic filling was indeterminate. Left Atrium: The left atrial size is mildly dilated. Right Ventricle: The right ventricle is normal in size. There is normal right ventricular global systolic function. Right Atrium: The right atrial size is mildly dilated. Aortic Valve: The aortic valve is trileaflet. There is mild aortic valve cusp calcification. There is no evidence of aortic valve stenosis. There is mild aortic valve regurgitation. Mitral Valve: The mitral valve is mildly thickened. There is mild to moderate mitral valve regurgitation. The E Vmax is 0.91 m/s. Tricuspid Valve: The tricuspid valve is structurally normal. There is moderate tricuspid regurgitation. The Doppler estimated right ventricular systolic pressure (RVSP) is moderately elevated at 58 mmHg. Pulmonic Valve: The pulmonic valve is structurally normal. There is mild pulmonic valve regurgitation. Pericardium: Trivial to small pericardial effusion. There is no evidence of cardiac tamponade. Aorta: The aortic root is normal. Systemic Veins: The inferior vena cava appears normal in size, with IVC inspiratory collapse less than 50%.  CONCLUSIONS:  1. The left ventricular systolic function is low normal with a visually estimated ejection fraction of 55%.  2. Left ventricular diastolic  filling was indeterminate.  3. There is normal right ventricular global systolic function.  4. Trivial to small pericardial effusion.  5. There is no evidence of cardiac tamponade.  6. Mild to moderate mitral valve regurgitation.  7. Moderate tricuspid regurgitation.  8. The Doppler estimated RVSP is moderately elevated at 58 mmHg.  9. Mild aortic valve regurgitation. QUANTITATIVE DATA SUMMARY:  2D MEASUREMENTS:             Normal Ranges: IVSd:            1.02 cm     (0.6-1.1cm) LVPWd:           0.72 cm     (0.6-1.1cm) LVIDd:           4.32 cm     (3.9-5.9cm) LVIDs:           2.51 cm LV Mass Index:   80.8 g/m2 LVEDV Index:     37.13 ml/m2 LV % FS          42.0 %  LEFT ATRIUM:                  Normal Ranges: LA Vol A4C:        78.2 ml    (22+/-6mL/m2) LA Vol A2C:        48.5 ml LA Vol BP:         62.6 ml LA Vol Index A4C:  53.4 ml/m2 LA Vol Index A2C:  33.1 ml/m2 LA Vol Index BP:   42.8 ml/m2 LA Area A4C:       23.1 cm2 LA Area A2C:       18.5 cm2 LA Major Axis A4C: 5.8 cm LA Major Axis A2C: 6.0 cm LA Volume Index:   42.4 ml/m2 LA Vol A4C:        73.4 ml LA Vol A2C:        45.9 ml LA Vol Index BSA:  40.7 ml/m2  RIGHT ATRIUM:          Normal Ranges: RA Area A4C:  20.4 cm2  M-MODE MEASUREMENTS:         Normal Ranges: Ao Root:             2.50 cm (2.0-3.7cm) AoV Exc:             1.60 cm (1.5-2.5cm) LAs:                 3.71 cm (2.7-4.0cm)  AORTA MEASUREMENTS:         Normal Ranges: AoV Exc:            1.60 cm (1.5-2.5cm) Ao Sinus, d:        2.80 cm (2.1-3.5cm) Ao STJ, d:          2.20 cm (1.7-3.4cm) Asc Ao, d:          2.70 cm (2.1-3.4cm)  LV SYSTOLIC FUNCTION:                      Normal Ranges: EF-A4C View:    53 % (>=55%) EF-A2C View:    45 % EF-Biplane:     51 % EF-Visual:      55 % LV EF Reported: 55 %  LV DIASTOLIC FUNCTION:           Normal Ranges: MV Peak E:             0.91 m/s  (0.7-1.2 m/s) MV Peak A:             0.44 m/s  (0.42-0.7 m/s) E/A Ratio:             2.08      (1.0-2.2) MV e'                  0.063  m/s (>8.0) MV lateral e'          0.07 m/s MV medial e'           0.06 m/s E/e' Ratio:            14.43     (<8.0)  MITRAL VALVE:          Normal Ranges: MV DT:        166 msec (150-240msec)  AORTIC VALVE:           Normal Ranges: LVOT Max Devendra:  0.97 m/s (<=1.1m/s) LVOT VTI:      13.74 cm LVOT Diameter: 1.88 cm  (1.8-2.4cm)  RIGHT VENTRICLE: RV Basal 3.83 cm RV Mid   3.00 cm RV Major 6.2 cm TAPSE:   18.5 mm RV s'    0.13 m/s  TRICUSPID VALVE/RVSP:          Normal Ranges: Peak TR Velocity:     3.55 m/s Est. RA Pressure:     8 mmHg RV Syst Pressure:     58 mmHg  (< 30mmHg) IVC Diam:             1.90 cm  AORTA: Asc Ao Diam 2.74 cm  [id number] Gallito Dumont MD Electronically signed on 8/6/2025 at 5:16:57 PM  ** Final **                                Assessment/Plan   Assessment & Plan        Acute cerebral infarction  MRI of the brain showed bilateral cerebral infarction significant for left MCA distribution.  Likely embolic.  Anticoagulation on hold for 7 days to prevent hemorrhagic transformation.  Continue atorvastatin   Holding aspirin  Echocardiogram EF 55%, otherwise unremarkable  S/p permissive hypertension for 48h  PT/OT following  Neurology consulted, continue supportive care and holding AC as above    Multiple hemosiderin foci for?  Intracranial hemorrhage  Right frontal lobe,  Possible area of hemorrhage  Continue to hold anticoagulation for another 7 days  Neurologist recommendations appreciated  ED d/w neurosurgery felt to be stable  PT/OT eval  Monitor neuro status  Follow up with neurosurgery as outpt     Acute encephalopathy  Possibly multifactorial from CVA, intracranial hemorrhage and MITUL  Anticoagulation on hold  Less likely due to UTI  Will discontinue antibiotics for now  TSH WNL  ammonia 42, vitamin B12 elevated  Passed bedside swallow eval, advanced diet, appears improved     Urinary retention and right hydronephrosis  U/S shows moderate R hydronephrosis and mild L pelvicaliectasis. Renal parenchymal  echogenicity is increased bilaterally consistent  with medical renal disease  Urologist consulted, continue llanes and plan for voiding trial, outpatient followup  Started tamsulosin 0.4mg every day  Discontinue llanes cath, plan for void trial today      Atrial fibrillation  Currently rate controlled  Anticoagulation on hold due to intracranial hemorrhage and stroke prophy prevent hemorrhagic transformation      HTN  Continue home medications              I spent 35 minutes in the professional and overall care of this patient. Discussed care with daughter at bedside and discussed with nurse and .      Curry Metcalf MD                 [1] [Held by provider] apixaban, 2.5 mg, oral, BID  ascorbic acid, 500 mg, oral, Daily  atorvastatin, 40 mg, oral, Nightly  cholecalciferol, 25 mcg, oral, Daily  cyanocobalamin, 1,000 mcg, oral, Daily  ferrous sulfate, 1 tablet, oral, Daily with breakfast  levothyroxine, 125 mcg, oral, Daily  losartan, 25 mg, oral, Daily  metoprolol tartrate, 50 mg, oral, BID  pantoprazole, 40 mg, oral, Daily before breakfast   Or  pantoprazole, 40 mg, intravenous, Daily before breakfast  polyethylene glycol, 17 g, oral, Daily  tamsulosin, 0.4 mg, oral, Daily     [2]    [3] PRN medications: acetaminophen **OR** acetaminophen **OR** acetaminophen, bisacodyl, guaiFENesin, melatonin, ondansetron **OR** ondansetron, oxygen

## 2025-08-10 VITALS
DIASTOLIC BLOOD PRESSURE: 67 MMHG | HEIGHT: 62 IN | SYSTOLIC BLOOD PRESSURE: 112 MMHG | BODY MASS INDEX: 18.66 KG/M2 | RESPIRATION RATE: 16 BRPM | TEMPERATURE: 97.1 F | WEIGHT: 101.41 LBS | HEART RATE: 90 BPM | OXYGEN SATURATION: 95 %

## 2025-08-10 LAB
ALBUMIN SERPL BCP-MCNC: 3.2 G/DL (ref 3.4–5)
ANION GAP SERPL CALC-SCNC: 11 MMOL/L (ref 10–20)
BUN SERPL-MCNC: 25 MG/DL (ref 6–23)
CALCIUM SERPL-MCNC: 7.7 MG/DL (ref 8.6–10.3)
CHLORIDE SERPL-SCNC: 104 MMOL/L (ref 98–107)
CO2 SERPL-SCNC: 26 MMOL/L (ref 21–32)
CREAT SERPL-MCNC: 0.93 MG/DL (ref 0.5–1.05)
EGFRCR SERPLBLD CKD-EPI 2021: 57 ML/MIN/1.73M*2
ERYTHROCYTE [DISTWIDTH] IN BLOOD BY AUTOMATED COUNT: 13.2 % (ref 11.5–14.5)
GLUCOSE SERPL-MCNC: 119 MG/DL (ref 74–99)
HCT VFR BLD AUTO: 38.5 % (ref 36–46)
HGB BLD-MCNC: 13.2 G/DL (ref 12–16)
MAGNESIUM SERPL-MCNC: 1.83 MG/DL (ref 1.6–2.4)
MCH RBC QN AUTO: 34.5 PG (ref 26–34)
MCHC RBC AUTO-ENTMCNC: 34.3 G/DL (ref 32–36)
MCV RBC AUTO: 101 FL (ref 80–100)
NRBC BLD-RTO: 0 /100 WBCS (ref 0–0)
PHOSPHATE SERPL-MCNC: 2.8 MG/DL (ref 2.5–4.9)
PLATELET # BLD AUTO: 229 X10*3/UL (ref 150–450)
POTASSIUM SERPL-SCNC: 4 MMOL/L (ref 3.5–5.3)
RBC # BLD AUTO: 3.83 X10*6/UL (ref 4–5.2)
SODIUM SERPL-SCNC: 137 MMOL/L (ref 136–145)
WBC # BLD AUTO: 8.8 X10*3/UL (ref 4.4–11.3)

## 2025-08-10 PROCEDURE — 99232 SBSQ HOSP IP/OBS MODERATE 35: CPT | Performed by: FAMILY MEDICINE

## 2025-08-10 PROCEDURE — 97530 THERAPEUTIC ACTIVITIES: CPT | Mod: GP,CQ

## 2025-08-10 PROCEDURE — 2500000004 HC RX 250 GENERAL PHARMACY W/ HCPCS (ALT 636 FOR OP/ED): Performed by: NURSE PRACTITIONER

## 2025-08-10 PROCEDURE — 85027 COMPLETE CBC AUTOMATED: CPT | Performed by: FAMILY MEDICINE

## 2025-08-10 PROCEDURE — 83735 ASSAY OF MAGNESIUM: CPT | Performed by: FAMILY MEDICINE

## 2025-08-10 PROCEDURE — 36415 COLL VENOUS BLD VENIPUNCTURE: CPT | Performed by: FAMILY MEDICINE

## 2025-08-10 PROCEDURE — 2500000002 HC RX 250 W HCPCS SELF ADMINISTERED DRUGS (ALT 637 FOR MEDICARE OP, ALT 636 FOR OP/ED): Performed by: NURSE PRACTITIONER

## 2025-08-10 PROCEDURE — 2500000002 HC RX 250 W HCPCS SELF ADMINISTERED DRUGS (ALT 637 FOR MEDICARE OP, ALT 636 FOR OP/ED): Performed by: FAMILY MEDICINE

## 2025-08-10 PROCEDURE — 2060000001 HC INTERMEDIATE ICU ROOM DAILY

## 2025-08-10 PROCEDURE — 2500000001 HC RX 250 WO HCPCS SELF ADMINISTERED DRUGS (ALT 637 FOR MEDICARE OP): Performed by: FAMILY MEDICINE

## 2025-08-10 PROCEDURE — 84520 ASSAY OF UREA NITROGEN: CPT | Performed by: FAMILY MEDICINE

## 2025-08-10 PROCEDURE — 2500000001 HC RX 250 WO HCPCS SELF ADMINISTERED DRUGS (ALT 637 FOR MEDICARE OP): Performed by: NURSE PRACTITIONER

## 2025-08-10 RX ADMIN — POLYETHYLENE GLYCOL 3350 17 G: 17 POWDER, FOR SOLUTION ORAL at 08:27

## 2025-08-10 RX ADMIN — LOSARTAN POTASSIUM 25 MG: 25 TABLET, FILM COATED ORAL at 08:26

## 2025-08-10 RX ADMIN — METOPROLOL TARTRATE 50 MG: 50 TABLET, FILM COATED ORAL at 20:24

## 2025-08-10 RX ADMIN — TAMSULOSIN HYDROCHLORIDE 0.4 MG: 0.4 CAPSULE ORAL at 08:26

## 2025-08-10 RX ADMIN — Medication 25 MCG: at 08:26

## 2025-08-10 RX ADMIN — ATORVASTATIN CALCIUM 40 MG: 40 TABLET, FILM COATED ORAL at 20:24

## 2025-08-10 RX ADMIN — OXYCODONE HYDROCHLORIDE AND ACETAMINOPHEN 500 MG: 500 TABLET ORAL at 08:26

## 2025-08-10 RX ADMIN — LEVOTHYROXINE SODIUM 125 MCG: 0.12 TABLET ORAL at 06:39

## 2025-08-10 RX ADMIN — METOPROLOL TARTRATE 50 MG: 50 TABLET, FILM COATED ORAL at 08:26

## 2025-08-10 RX ADMIN — FERROUS SULFATE TAB 325 MG (65 MG ELEMENTAL FE) 1 TABLET: 325 (65 FE) TAB at 08:25

## 2025-08-10 RX ADMIN — PANTOPRAZOLE SODIUM 40 MG: 40 INJECTION, POWDER, FOR SOLUTION INTRAVENOUS at 06:39

## 2025-08-10 RX ADMIN — Medication 1000 MCG: at 08:26

## 2025-08-10 ASSESSMENT — PAIN SCALES - PAIN ASSESSMENT IN ADVANCED DEMENTIA (PAINAD)
BODYLANGUAGE: RELAXED
BODYLANGUAGE: RELAXED
BREATHING: NORMAL
BREATHING: NORMAL
TOTALSCORE: 0
TOTALSCORE: 0
BREATHING: NORMAL
CONSOLABILITY: NO NEED TO CONSOLE
TOTALSCORE: 0
CONSOLABILITY: NO NEED TO CONSOLE
FACIALEXPRESSION: SMILING OR INEXPRESSIVE
FACIALEXPRESSION: SMILING OR INEXPRESSIVE
BODYLANGUAGE: RELAXED
CONSOLABILITY: NO NEED TO CONSOLE
TOTALSCORE: 0
BODYLANGUAGE: RELAXED
CONSOLABILITY: NO NEED TO CONSOLE
FACIALEXPRESSION: SMILING OR INEXPRESSIVE
BREATHING: NORMAL
FACIALEXPRESSION: SMILING OR INEXPRESSIVE

## 2025-08-10 ASSESSMENT — PAIN SCALES - GENERAL
PAINLEVEL_OUTOF10: 0 - NO PAIN

## 2025-08-10 ASSESSMENT — PAIN - FUNCTIONAL ASSESSMENT
PAIN_FUNCTIONAL_ASSESSMENT: PAINAD (PAIN ASSESSMENT IN ADVANCED DEMENTIA SCALE)
PAIN_FUNCTIONAL_ASSESSMENT: 0-10

## 2025-08-10 ASSESSMENT — COGNITIVE AND FUNCTIONAL STATUS - GENERAL
TURNING FROM BACK TO SIDE WHILE IN FLAT BAD: A LOT
WALKING IN HOSPITAL ROOM: TOTAL
MOBILITY SCORE: 10
CLIMB 3 TO 5 STEPS WITH RAILING: TOTAL
STANDING UP FROM CHAIR USING ARMS: A LOT
MOVING TO AND FROM BED TO CHAIR: A LOT
MOVING FROM LYING ON BACK TO SITTING ON SIDE OF FLAT BED WITH BEDRAILS: A LOT

## 2025-08-10 NOTE — CARE PLAN
The patient's goals for the shift include  No falls and ambulation at least three times.     The clinical goals for the shift include No falls and ambulation at least three times.    Over the shift, the patient did not make progress toward the following goals. Barriers to progression include fatigue and weakness  2 person max assist. Very high falls risk. alarms are in place.. Recommendations to address these barriers include PT/OT, nutrition of 3 meals per day. Frequent encouragement to ambulate with staff.

## 2025-08-10 NOTE — CARE PLAN
The patient's goals for the shift include      The clinical goals for the shift include 2 person max assist. Very high falls risk. alarms are in place.    Over the shift, the patient did not make progress toward the following goals. Barriers to progression include Weakness and confusion. Recommendations to address these barriers include PT/OT, nutrition and support with ADL's.

## 2025-08-10 NOTE — CARE PLAN
The patient's goals for the shift include  pt will rest comfortably though night.    The clinical goals for the shift include no falls    Over the shift, the patient did make progress toward the following goals. Barriers to progression include education. Recommendations to address these barriers include education reinforcement.

## 2025-08-10 NOTE — CARE PLAN
The patient's goals for the shift include      The clinical goals for the shift include no falls    Over the shift, the patient did not make progress toward the following goals. Barriers to progression include Patient's a fib with rapid HR was present this morning. Meds were given and HR returned to normal range. Patient is resting comfortably with family at bedside. Will reattempts to ambulate after HR normalizes and patient recovers. . Recommendations to address these barriers include Nutrition three times per day. Monitor VS and urine out. No void yet this shift. Bladder Scanner 381 ML (6 hours since last cath). Will continue to monitor.

## 2025-08-10 NOTE — PROGRESS NOTES
"Kaitlin Sarkar is a 89 y.o. female on day 5 of admission presenting with Delirium.    Subjective   Patient seen awake in bed. She reports eating well, no complaints at this time. Nurse reports patient had an elevated heart rate this morning within 30 minutes of her morning meds. Patient denies chest pain or palpitations. No reported overnight events by nursing staff.     ROS:  Constitutional: No fever, no chills, no fatigue  HEENT: No headache, no vision changes, no hearing loss, no nasal congestion  Respiratory: No cough, no SOB  Cardiac: No chest pain, no palpitations, no swelling of limbs  GI: No nausea, no vomiting, no diarrhea  Musculoskeletal: No back pain, no myalgia  Neuro: No seizures, no dizziness, no lightheadedness  Heme: No easy bruising, no bleeding  Genitourinary:  Pelvic pain. No Urgency       Objective     Physical Exam  Gen: Elderly adult female, frail, NAD, age appropriate.  HEENT: Normocephalic, atraumatic, no oral lesions appreciated  Neck: No cervical lymphadenopathy appreciated, no thyroid enlargement appreciated  CV: Irregularly irregular rate, S1 and S2 present, no murmurs appreciated. No limb edema appreciated  Resp: No increased work of breathing, no wheezes appreciated  Abdomen: nondistended, llanes catheter with pale pink urine in bag  MSK: No joint swelling appreciated  Neuro: awake, alert, answering questions, moves all limbs although apparent L sided upper extremity deficit  Psych: appropriate mood and affect      Last Recorded Vitals  Blood pressure 138/82, pulse 88, temperature 37 °C (98.6 °F), resp. rate 16, height 1.575 m (5' 2.01\"), weight 46 kg (101 lb 6.6 oz), SpO2 94%.  Intake/Output last 3 Shifts:  I/O last 3 completed shifts:  In: 460 (10 mL/kg) [P.O.:460]  Out: 1575 (34.2 mL/kg) [Urine:1575 (1 mL/kg/hr)]  Weight: 46 kg       Relevant Results  Scheduled medications  Scheduled Medications[1]  Continuous medications  Continuous Medications[2]  PRN medications  PRN " Medications[3]    Results for orders placed or performed during the hospital encounter of 08/05/25 (from the past 24 hours)   Magnesium   Result Value Ref Range    Magnesium 1.83 1.60 - 2.40 mg/dL   Renal Function Panel   Result Value Ref Range    Glucose 119 (H) 74 - 99 mg/dL    Sodium 137 136 - 145 mmol/L    Potassium 4.0 3.5 - 5.3 mmol/L    Chloride 104 98 - 107 mmol/L    Bicarbonate 26 21 - 32 mmol/L    Anion Gap 11 10 - 20 mmol/L    Urea Nitrogen 25 (H) 6 - 23 mg/dL    Creatinine 0.93 0.50 - 1.05 mg/dL    eGFR 57 (L) >60 mL/min/1.73m*2    Calcium 7.7 (L) 8.6 - 10.3 mg/dL    Phosphorus 2.8 2.5 - 4.9 mg/dL    Albumin 3.2 (L) 3.4 - 5.0 g/dL   CBC   Result Value Ref Range    WBC 8.8 4.4 - 11.3 x10*3/uL    nRBC 0.0 0.0 - 0.0 /100 WBCs    RBC 3.83 (L) 4.00 - 5.20 x10*6/uL    Hemoglobin 13.2 12.0 - 16.0 g/dL    Hematocrit 38.5 36.0 - 46.0 %     (H) 80 - 100 fL    MCH 34.5 (H) 26.0 - 34.0 pg    MCHC 34.3 32.0 - 36.0 g/dL    RDW 13.2 11.5 - 14.5 %    Platelets 229 150 - 450 x10*3/uL         Imaging  MR brain wo IV contrast  Addendum Date: 8/6/2025  Interpreted By:  Shad Bryan, ADDENDUM: A subcentimeter focus of diffusion restriction is also noted in the expected location of the right motor strip at the vertex. Finding is consistent with acute infarction in the distribution of the right middle cerebral artery.   Signed by: Shad Bryan 8/6/2025 3:35 PM   -------- ORIGINAL REPORT -------- Dictation workstation:   [id number]    Addendum Date: 8/6/2025  Interpreted By:  Shad Bryan, ADDENDUM: Shad Bryan discussed the significance and urgency of this critical finding by EPIC secure chat with  MIKAYLA SERNA; JULIA WONG on 8/6/2025 at 12:08 pm.  (**-RCF-**) Findings:  See findings.     Signed by: Shad Bryan 8/6/2025 12:08 PM   -------- ORIGINAL REPORT -------- Dictation workstation:   [id number]    Result Date: 8/6/2025  Acute/subacute infarction in the distribution of the posterior  division of the left middle cerebral artery. No associated hemorrhage or mass effect Abundant foci of hemosiderin deposition especially in the right hemisphere but also within the left hemisphere and midbrain of uncertain cause and significance. Consider amyloid angiopathy MRA of the carotid bifurcations is within normal limits. There is a paucity of distal branches of the right middle cerebral artery which could be artifactual There is no evidence of intracranial aneurysm or vascular malformation.   MACRO: none   Signed by: Shad Bryan 8/6/2025 11:30 AM Dictation workstation:   [id number]    MR angio head wo IV contrast  Addendum Date: 8/6/2025  Interpreted By:  Shad Bryan, ADDENDUM: A subcentimeter focus of diffusion restriction is also noted in the expected location of the right motor strip at the vertex. Finding is consistent with acute infarction in the distribution of the right middle cerebral artery.   Signed by: Sahd Bryan 8/6/2025 3:35 PM   -------- ORIGINAL REPORT -------- Dictation workstation:   [id number]    Addendum Date: 8/6/2025  Interpreted By:  Shad Bryan, ADDENDUM: Shad Bryan discussed the significance and urgency of this critical finding by EPIC secure chat with  MIKAYLA SERNA; JULIA WONG on 8/6/2025 at 12:08 pm.  (**-RCF-**) Findings:  See findings.     Signed by: Shad Bryan 8/6/2025 12:08 PM   -------- ORIGINAL REPORT -------- Dictation workstation:   [id number]    Result Date: 8/6/2025  Acute/subacute infarction in the distribution of the posterior division of the left middle cerebral artery. No associated hemorrhage or mass effect Abundant foci of hemosiderin deposition especially in the right hemisphere but also within the left hemisphere and midbrain of uncertain cause and significance. Consider amyloid angiopathy MRA of the carotid bifurcations is within normal limits. There is a paucity of distal branches of the right middle cerebral artery  which could be artifactual There is no evidence of intracranial aneurysm or vascular malformation.   MACRO: none   Signed by: Shad Bryan 8/6/2025 11:30 AM Dictation workstation:   [id number]    MR angio neck wo IV contrast  Addendum Date: 8/6/2025  Interpreted By:  Shad Bryan, ADDENDUM: A subcentimeter focus of diffusion restriction is also noted in the expected location of the right motor strip at the vertex. Finding is consistent with acute infarction in the distribution of the right middle cerebral artery.   Signed by: Shad Bryan 8/6/2025 3:35 PM   -------- ORIGINAL REPORT -------- Dictation workstation:   [id number]    Addendum Date: 8/6/2025  Interpreted By:  Shad Bryan, ADDENDUM: Shad Bryan discussed the significance and urgency of this critical finding by EPIC secure chat with  MIKAYLA SERNA; JULIA WONG on 8/6/2025 at 12:08 pm.  (**-RCF-**) Findings:  See findings.     Signed by: Shad Bryan 8/6/2025 12:08 PM   -------- ORIGINAL REPORT -------- Dictation workstation:   [id number]    Result Date: 8/6/2025  Acute/subacute infarction in the distribution of the posterior division of the left middle cerebral artery. No associated hemorrhage or mass effect Abundant foci of hemosiderin deposition especially in the right hemisphere but also within the left hemisphere and midbrain of uncertain cause and significance. Consider amyloid angiopathy MRA of the carotid bifurcations is within normal limits. There is a paucity of distal branches of the right middle cerebral artery which could be artifactual There is no evidence of intracranial aneurysm or vascular malformation.   MACRO: none   Signed by: Shad Bryan 8/6/2025 11:30 AM Dictation workstation:   [id number]     renal complete  Result Date: 8/5/2025  Moderate right hydronephrosis and mild left pelvicaliectasis are noted in the context of a very full urinary bladder. No postvoid imaging was obtained.   Renal  parenchymal echogenicity is increased bilaterally consistent with medical renal disease   MACRO: None   Signed by: Dariana Chowdhury 8/5/2025 3:14 PM Dictation workstation:   [id number]    CT head wo IV contrast  Result Date: 8/5/2025  1.  Stable appearance 5 mm round focus of hypoattenuation anterior right frontal lobe, without evidence of edema or mass effect. This likely represents an unchanged parenchymal hemorrhage or a cavernoma. 2. Chronic microvascular ischemia and involutional changes.     Signed by: Davian Odom 8/5/2025 9:07 AM Dictation workstation:   [id number]    CT head wo IV contrast  Result Date: 8/5/2025  Punctate focus of increased density in the right frontal lobe not definitively seen on prior imaging, which may represent a small focus of hemorrhage. Consider interval CT follow-up in 4 hours to assess stability. No mass effect or midline shift.   Nonspecific scattered white matter hypodensities favored to represent sequela of small vessel ischemia.       MACRO: Evan Finkelstein discussed the significance and urgency of this critical finding by EPIC secure chat with  IRAIS PALACIOS on 8/5/2025 at 4:41 am.  (**-RCF-**) Findings:  See findings.   .   Signed by: Evan Finkelstein 8/5/2025 4:41 AM Dictation workstation:   [id number]    XR chest 1 view  Result Date: 8/5/2025  1.  No definite evidence of acute cardiopulmonary process. Similar cardiomegaly.       MACRO: None   Signed by: Ashish Sauceda 8/5/2025 2:53 AM Dictation workstation:   [id number]      Cardiology, Vascular, and Other Imaging  ECG 12 lead  Result Date: 8/9/2025  Atrial fibrillation Right bundle branch block Nonspecific T abnormalities, lateral leads See ED provider note for full interpretation and clinical correlation Confirmed by Pearl Gatica ([id number]) on 8/9/2025 11:20:49 AM    Transthoracic Echo Complete  Result Date: 8/6/2025              [address] [address]      Phone  [phone] Fax [phone] TRANSTHORACIC ECHOCARDIOGRAM REPORT Patient Name:       WESLEY LOGAN         [place] Physician:    [id number] Gallito Dumont MD Study Date:         8/6/2025             Ordering Provider:    [id number] ANGELA TANG MRN/PID:            [id number]             Fellow: Accession#:         [id number]         Nurse: Date of Birth/Age:  10/18/1930 / 89      Sonographer:          Zahra Brennan RDCS                     years Gender Assigned at  F                    Additional Staff: Birth: Height:             152.40 cm            Admit Date:           8/5/2025 Weight:             51.26 kg             Admission Status:     Inpatient -                                                                Routine BSA / BMI:          1.46 m2 / 22.07      Department Location:  [facility]                     kg/m2 Blood Pressure: 153 /84 mmHg Study Type:    TRANSTHORACIC ECHO (TTE) COMPLETE Diagnosis/ICD: Transient cerebral ischemic attack, unspecified (NOT on                LCD)-G45.9 Indication:    Stroke CPT Codes:     Echo Complete w Full [id number]  Study Detail: The following Echo studies were performed: 2D, M-Mode, Doppler and               color flow. Patient's heart rhythm is atrial fibrillation.  PHYSICIAN INTERPRETATION: Left Ventricle: The left ventricular systolic function is low normal with a visually estimated ejection fraction of 55%. There are no regional wall motion abnormalities. The left ventricular cavity size is normal. There is mild increased septal and normal posterior left ventricular wall thickness. Left ventricular diastolic filling was indeterminate. Left Atrium: The left atrial size is mildly dilated. Right Ventricle: The right ventricle is normal in size. There is normal right ventricular global systolic function. Right Atrium: The right atrial size is mildly  dilated. Aortic Valve: The aortic valve is trileaflet. There is mild aortic valve cusp calcification. There is no evidence of aortic valve stenosis. There is mild aortic valve regurgitation. Mitral Valve: The mitral valve is mildly thickened. There is mild to moderate mitral valve regurgitation. The E Vmax is 0.91 m/s. Tricuspid Valve: The tricuspid valve is structurally normal. There is moderate tricuspid regurgitation. The Doppler estimated right ventricular systolic pressure (RVSP) is moderately elevated at 58 mmHg. Pulmonic Valve: The pulmonic valve is structurally normal. There is mild pulmonic valve regurgitation. Pericardium: Trivial to small pericardial effusion. There is no evidence of cardiac tamponade. Aorta: The aortic root is normal. Systemic Veins: The inferior vena cava appears normal in size, with IVC inspiratory collapse less than 50%.  CONCLUSIONS:  1. The left ventricular systolic function is low normal with a visually estimated ejection fraction of 55%.  2. Left ventricular diastolic filling was indeterminate.  3. There is normal right ventricular global systolic function.  4. Trivial to small pericardial effusion.  5. There is no evidence of cardiac tamponade.  6. Mild to moderate mitral valve regurgitation.  7. Moderate tricuspid regurgitation.  8. The Doppler estimated RVSP is moderately elevated at 58 mmHg.  9. Mild aortic valve regurgitation. QUANTITATIVE DATA SUMMARY:  2D MEASUREMENTS:             Normal Ranges: IVSd:            1.02 cm     (0.6-1.1cm) LVPWd:           0.72 cm     (0.6-1.1cm) LVIDd:           4.32 cm     (3.9-5.9cm) LVIDs:           2.51 cm LV Mass Index:   80.8 g/m2 LVEDV Index:     37.13 ml/m2 LV % FS          42.0 %  LEFT ATRIUM:                  Normal Ranges: LA Vol A4C:        78.2 ml    (22+/-6mL/m2) LA Vol A2C:        48.5 ml LA Vol BP:         62.6 ml LA Vol Index A4C:  53.4 ml/m2 LA Vol Index A2C:  33.1 ml/m2 LA Vol Index BP:   42.8 ml/m2 LA Area A4C:       23.1  cm2 LA Area A2C:       18.5 cm2 LA Major Axis A4C: 5.8 cm LA Major Axis A2C: 6.0 cm LA Volume Index:   42.4 ml/m2 LA Vol A4C:        73.4 ml LA Vol A2C:        45.9 ml LA Vol Index BSA:  40.7 ml/m2  RIGHT ATRIUM:          Normal Ranges: RA Area A4C:  20.4 cm2  M-MODE MEASUREMENTS:         Normal Ranges: Ao Root:             2.50 cm (2.0-3.7cm) AoV Exc:             1.60 cm (1.5-2.5cm) LAs:                 3.71 cm (2.7-4.0cm)  AORTA MEASUREMENTS:         Normal Ranges: AoV Exc:            1.60 cm (1.5-2.5cm) Ao Sinus, d:        2.80 cm (2.1-3.5cm) Ao STJ, d:          2.20 cm (1.7-3.4cm) Asc Ao, d:          2.70 cm (2.1-3.4cm)  LV SYSTOLIC FUNCTION:                      Normal Ranges: EF-A4C View:    53 % (>=55%) EF-A2C View:    45 % EF-Biplane:     51 % EF-Visual:      55 % LV EF Reported: 55 %  LV DIASTOLIC FUNCTION:           Normal Ranges: MV Peak E:             0.91 m/s  (0.7-1.2 m/s) MV Peak A:             0.44 m/s  (0.42-0.7 m/s) E/A Ratio:             2.08      (1.0-2.2) MV e'                  0.063 m/s (>8.0) MV lateral e'          0.07 m/s MV medial e'           0.06 m/s E/e' Ratio:            14.43     (<8.0)  MITRAL VALVE:          Normal Ranges: MV DT:        166 msec (150-240msec)  AORTIC VALVE:           Normal Ranges: LVOT Max Devendra:  0.97 m/s (<=1.1m/s) LVOT VTI:      13.74 cm LVOT Diameter: 1.88 cm  (1.8-2.4cm)  RIGHT VENTRICLE: RV Basal 3.83 cm RV Mid   3.00 cm RV Major 6.2 cm TAPSE:   18.5 mm RV s'    0.13 m/s  TRICUSPID VALVE/RVSP:          Normal Ranges: Peak TR Velocity:     3.55 m/s Est. RA Pressure:     8 mmHg RV Syst Pressure:     58 mmHg  (< 30mmHg) IVC Diam:             1.90 cm  AORTA: Asc Ao Diam 2.74 cm  [id number] Gallito Dumont MD Electronically signed on 8/6/2025 at 5:16:57 PM  ** Final **                                Assessment/Plan   Assessment & Plan        Acute cerebral infarction  MRI of the brain showed bilateral cerebral infarction significant for left MCA distribution.  Likely  embolic.  Anticoagulation on hold for 7 days to prevent hemorrhagic transformation (resume 8/12).  Continue atorvastatin   Holding aspirin  Echocardiogram EF 55%, otherwise unremarkable  PT/OT following  Neurology consulted, continue supportive care and holding AC as above    Multiple hemosiderin foci for?  Intracranial hemorrhage  Right frontal lobe,  Possible area of hemorrhage  Continue to hold anticoagulation for another 7 days  ED d/w neurosurgery felt to be stable  Neurology consulted  Monitor neuro status  Follow up with neurosurgery as outpt     Acute encephalopathy  Possibly multifactorial from CVA, intracranial hemorrhage and MITUL  Anticoagulation on hold  Less likely due to UTI  Discontinued antibiotics   TSH WNL  ammonia 42, vitamin B12 elevated  Passed bedside swallow eval, advanced diet, appears improved     Urinary retention and right hydronephrosis  U/S shows moderate R hydronephrosis and mild L pelvicaliectasis. Renal parenchymal echogenicity is increased bilaterally consistent with medical renal disease  Urologist consulted, recommended llanes then voiding trial, outpatient followup  Started tamsulosin 0.4mg every day  Discontinued llanes cath 8/9, increased postvoid residuals, will consider resuming llanes if straight cath x2      Atrial fibrillation  Currently rate controlled  Anticoagulation on hold due to intracranial hemorrhage and stroke prophylaxis to prevent hemorrhagic transformation      HTN  Continue home medications              I spent 35 minutes in the professional and overall care of this patient. Discussed care with daughter at bedside and discussed with nurse and .      Curry Metcalf MD                   [1] [Held by provider] apixaban, 2.5 mg, oral, BID  ascorbic acid, 500 mg, oral, Daily  atorvastatin, 40 mg, oral, Nightly  cholecalciferol, 25 mcg, oral, Daily  cyanocobalamin, 1,000 mcg, oral, Daily  ferrous sulfate, 1 tablet, oral, Daily with  breakfast  levothyroxine, 125 mcg, oral, Daily  losartan, 25 mg, oral, Daily  metoprolol tartrate, 50 mg, oral, BID  pantoprazole, 40 mg, oral, Daily before breakfast   Or  pantoprazole, 40 mg, intravenous, Daily before breakfast  polyethylene glycol, 17 g, oral, Daily  tamsulosin, 0.4 mg, oral, Daily     [2]    [3] PRN medications: acetaminophen **OR** acetaminophen **OR** acetaminophen, bisacodyl, guaiFENesin, melatonin, ondansetron **OR** ondansetron, oxygen

## 2025-08-10 NOTE — PROGRESS NOTES
Physical Therapy    Physical Therapy Treatment    Patient Name: Kaitlin Sarkar  MRN: [id number]  Department: [address]  Room: 2028/2028-A  Today's Date: 8/10/2025  Time Calculation  Start Time: 1623  Stop Time: 1646  Time Calculation (min): 23 min         Assessment/Plan   PT Assessment  PT Assessment Results: Decreased strength, Decreased range of motion, Decreased endurance, Impaired balance, Decreased mobility, Decreased coordination, Decreased cognition, Impaired judgement, Decreased safety awareness, Impaired hearing, Decreased skin integrity (pt. limited by decreased strength, motivation and endurance. pt. needs Max A of 1 for transfers and bed mobility. Would benefit from A of 2 to progress safely and optimize mobilization. pt. up in alarmed chair with call light in reach.)  Barriers to Discharge Home: Physical needs, Cognition needs, Caregiver assistance  End of Session Communication: Bedside nurse  End of Session Patient Position: Up in chair, Alarm on     PT Plan  Treatment/Interventions: Bed mobility, Transfer training, Gait training, Neuromuscular re-education, Therapeutic exercise, Therapeutic activity  PT Plan: Ongoing PT  PT Frequency: 5 times per week (during this acute adm)  PT Discharge Recommendations: Moderate intensity level of continued care, Other (comment) (Based on current functional status and rehab potential, patient is anticipated to tolerate and benefit from 5 or more days per week of skilled rehabilitative therapy after discharge from this acute inpatient hospitalization.)  Equipment Recommended upon Discharge: Wheelchair, Other (comment) (bedrails. recomm dtr borrow or rent a wheelchair for initial mobility, anticipate pt will needed extended rehab before able to amb well)  PT - OK to Discharge: Yes (to next level of care)    PT Visit Info:        General Visit Information:   General  Prior to Session Communication: Bedside nurse  Patient Position Received: Bed, 3 rail up, Alarm on  General  Comment: Daughter present. Pt. [place], difficult with directions with multiple people talkihg. Simple one direction tasks.    Subjective   Precautions:  Precautions  Medical Precautions: Fall precautions     Date/Time Vitals Session Patient Position Pulse Resp SpO2 BP MAP (mmHg)    08/10/25 1636 --  --  111  --  --  --  --     08/10/25 1646 --  --  120  --  --  --  --            Objective   Pain:  Pain Assessment  Pain Assessment: 0-10  0-10 (Numeric) Pain Score: 0 - No pain (No c/o pain, no s/s)  Cognition:  Cognition  Orientation Level: Disoriented to place, Disoriented to situation  Coordination:     Postural Control:     Extremity/Trunk Assessments:    Activity Tolerance:  Activity Tolerance  Endurance: Tolerates 10 - 20 min exercise with multiple rests  Treatments:  Therapeutic Exercise  Therapeutic Exercise Performed: Yes (LAQ's x's 5 BRENT. LE's seated in chair. Difficult to keep on task and complete full AROM)    Therapeutic Activity  Therapeutic Activity Performed: Yes (EOB sit x's 6 minutes with Mod A of 1 and Poor static sitting balance.)    Bed Mobility  Bed Mobility: Yes (Supine-->Sit Max A of 1 with vc's for pt. to utilize abdominals to maintain neutral posture. pt. with slow retro lean neeind Mod A of 1 and vc's to correct.)    Transfers  Transfer: Yes (Sit<-->stand transfer from EOB with Max A of 1 and vc's for WS over COG.  SPT transfer from EOB to recliner chair with Max A of 1.)    Outcome Measures:  [facility] Basic Mobility  Turning from your back to your side while in a flat bed without using bedrails: A lot  Moving from lying on your back to sitting on the side of a flat bed without using bedrails: A lot  Moving to and from bed to chair (including a wheelchair): A lot  Standing up from a chair using your arms (e.g. wheelchair or bedside chair): A lot  To walk in hospital room: Total  Climbing 3-5 steps with railing: Total  Basic Mobility - Total Score: 10    Education Documentation  Precautions, taught  by Tonia Conklin PTA at 8/10/2025  6:05 PM.  Learner: Family, Patient  Readiness: Acceptance  Method: Explanation, Demonstration  Response: Verbalizes Understanding, Demonstrated Understanding, Needs Reinforcement    Mobility Training, taught by Tonia Conklin PTA at 8/10/2025  6:05 PM.  Learner: Family, Patient  Readiness: Acceptance  Method: Explanation, Demonstration  Response: Verbalizes Understanding, Demonstrated Understanding, Needs Reinforcement    Body Mechanics, taught by Tonia Conklin PTA at 8/10/2025  6:05 PM.  Learner: Family, Patient  Readiness: Acceptance  Method: Explanation, Demonstration  Response: Verbalizes Understanding, Demonstrated Understanding, Needs Reinforcement    Precautions, taught by Tonia Conklin PTA at 8/10/2025  6:05 PM.  Learner: Family, Patient  Readiness: Acceptance  Method: Explanation, Demonstration  Response: Verbalizes Understanding, Demonstrated Understanding, Needs Reinforcement    ADL Training, taught by Tonia Conklin PTA at 8/10/2025  6:05 PM.  Learner: Family, Patient  Readiness: Acceptance  Method: Explanation, Demonstration  Response: Verbalizes Understanding, Demonstrated Understanding, Needs Reinforcement    Education Comments  No comments found.        OP EDUCATION:       Encounter Problems       Encounter Problems (Active)       Mobility       STG - Patient will ambulate household distance using WALKER (instead of cane used at baseline) with no more than min Ax1 so that dtr can manage pt at home (Not Progressing)       Start:  08/06/25    Expected End:  08/20/25            STG - Patient will navigate 2 steps with rails/device and no more than MOD Ax1 to enter/exit home with dtr assist  (Not Progressing)       Start:  08/06/25    Expected End:  08/20/25               PT Transfers       STG - Transfer from bed to chair/BSC using WALKER (instead of cane used at baseline) with no more than min Ax1 so that dtr can manage pt at home (Progressing)        Start:  08/06/25    Expected End:  08/20/25               Strengthening        Pt will perform 10+ reps AAROM/AROM/RROM BLE to improve functional strength needed for improved mobility.  (Progressing)       Start:  08/06/25    Expected End:  08/20/25

## 2025-08-11 PROCEDURE — 97116 GAIT TRAINING THERAPY: CPT | Mod: GP,CQ

## 2025-08-11 PROCEDURE — 2060000001 HC INTERMEDIATE ICU ROOM DAILY

## 2025-08-11 PROCEDURE — 2500000001 HC RX 250 WO HCPCS SELF ADMINISTERED DRUGS (ALT 637 FOR MEDICARE OP): Performed by: FAMILY MEDICINE

## 2025-08-11 PROCEDURE — 2500000002 HC RX 250 W HCPCS SELF ADMINISTERED DRUGS (ALT 637 FOR MEDICARE OP, ALT 636 FOR OP/ED): Performed by: NURSE PRACTITIONER

## 2025-08-11 PROCEDURE — 2500000004 HC RX 250 GENERAL PHARMACY W/ HCPCS (ALT 636 FOR OP/ED): Performed by: NURSE PRACTITIONER

## 2025-08-11 PROCEDURE — 99232 SBSQ HOSP IP/OBS MODERATE 35: CPT | Performed by: INTERNAL MEDICINE

## 2025-08-11 PROCEDURE — 97110 THERAPEUTIC EXERCISES: CPT | Mod: GP,CQ

## 2025-08-11 PROCEDURE — 2500000002 HC RX 250 W HCPCS SELF ADMINISTERED DRUGS (ALT 637 FOR MEDICARE OP, ALT 636 FOR OP/ED): Performed by: FAMILY MEDICINE

## 2025-08-11 PROCEDURE — 97535 SELF CARE MNGMENT TRAINING: CPT | Mod: GO,CO

## 2025-08-11 PROCEDURE — 2500000004 HC RX 250 GENERAL PHARMACY W/ HCPCS (ALT 636 FOR OP/ED): Performed by: INTERNAL MEDICINE

## 2025-08-11 PROCEDURE — 2500000001 HC RX 250 WO HCPCS SELF ADMINISTERED DRUGS (ALT 637 FOR MEDICARE OP): Performed by: INTERNAL MEDICINE

## 2025-08-11 PROCEDURE — 2500000001 HC RX 250 WO HCPCS SELF ADMINISTERED DRUGS (ALT 637 FOR MEDICARE OP): Performed by: NURSE PRACTITIONER

## 2025-08-11 PROCEDURE — 97530 THERAPEUTIC ACTIVITIES: CPT | Mod: GO,CO

## 2025-08-11 RX ORDER — ASPIRIN 81 MG/1
81 TABLET ORAL DAILY
Status: DISCONTINUED | OUTPATIENT
Start: 2025-08-11 | End: 2025-08-12 | Stop reason: HOSPADM

## 2025-08-11 RX ORDER — MAGNESIUM SULFATE HEPTAHYDRATE 40 MG/ML
2 INJECTION, SOLUTION INTRAVENOUS ONCE
Status: COMPLETED | OUTPATIENT
Start: 2025-08-11 | End: 2025-08-11

## 2025-08-11 RX ADMIN — Medication 25 MCG: at 09:13

## 2025-08-11 RX ADMIN — PANTOPRAZOLE SODIUM 40 MG: 40 INJECTION, POWDER, FOR SOLUTION INTRAVENOUS at 06:19

## 2025-08-11 RX ADMIN — OXYCODONE HYDROCHLORIDE AND ACETAMINOPHEN 500 MG: 500 TABLET ORAL at 09:13

## 2025-08-11 RX ADMIN — FERROUS SULFATE TAB 325 MG (65 MG ELEMENTAL FE) 1 TABLET: 325 (65 FE) TAB at 09:13

## 2025-08-11 RX ADMIN — ASPIRIN 81 MG: 81 TABLET, DELAYED RELEASE ORAL at 15:36

## 2025-08-11 RX ADMIN — TAMSULOSIN HYDROCHLORIDE 0.4 MG: 0.4 CAPSULE ORAL at 09:13

## 2025-08-11 RX ADMIN — Medication 1000 MCG: at 09:13

## 2025-08-11 RX ADMIN — ATORVASTATIN CALCIUM 40 MG: 40 TABLET, FILM COATED ORAL at 19:50

## 2025-08-11 RX ADMIN — LOSARTAN POTASSIUM 25 MG: 25 TABLET, FILM COATED ORAL at 09:13

## 2025-08-11 RX ADMIN — METOPROLOL TARTRATE 50 MG: 50 TABLET, FILM COATED ORAL at 09:12

## 2025-08-11 RX ADMIN — MAGNESIUM SULFATE HEPTAHYDRATE 2 G: 40 INJECTION, SOLUTION INTRAVENOUS at 09:12

## 2025-08-11 RX ADMIN — LEVOTHYROXINE SODIUM 125 MCG: 0.12 TABLET ORAL at 06:19

## 2025-08-11 RX ADMIN — METOPROLOL TARTRATE 50 MG: 50 TABLET, FILM COATED ORAL at 19:50

## 2025-08-11 ASSESSMENT — PAIN SCALES - PAIN ASSESSMENT IN ADVANCED DEMENTIA (PAINAD)
CONSOLABILITY: NO NEED TO CONSOLE
BREATHING: NORMAL
FACIALEXPRESSION: SMILING OR INEXPRESSIVE
TOTALSCORE: 0
BODYLANGUAGE: RELAXED
FACIALEXPRESSION: SMILING OR INEXPRESSIVE
CONSOLABILITY: NO NEED TO CONSOLE
BODYLANGUAGE: RELAXED
TOTALSCORE: 0
BREATHING: NORMAL

## 2025-08-11 ASSESSMENT — PAIN SCALES - GENERAL
PAINLEVEL_OUTOF10: 0 - NO PAIN

## 2025-08-11 ASSESSMENT — COGNITIVE AND FUNCTIONAL STATUS - GENERAL
CLIMB 3 TO 5 STEPS WITH RAILING: TOTAL
HELP NEEDED FOR BATHING: A LOT
PERSONAL GROOMING: A LOT
MOVING TO AND FROM BED TO CHAIR: A LOT
DRESSING REGULAR LOWER BODY CLOTHING: A LOT
TOILETING: A LOT
TURNING FROM BACK TO SIDE WHILE IN FLAT BAD: A LOT
DAILY ACTIVITIY SCORE: 13
STANDING UP FROM CHAIR USING ARMS: A LOT
DRESSING REGULAR UPPER BODY CLOTHING: A LOT
MOVING FROM LYING ON BACK TO SITTING ON SIDE OF FLAT BED WITH BEDRAILS: A LOT
MOBILITY SCORE: 11
EATING MEALS: A LITTLE
WALKING IN HOSPITAL ROOM: A LOT

## 2025-08-11 ASSESSMENT — PAIN - FUNCTIONAL ASSESSMENT
PAIN_FUNCTIONAL_ASSESSMENT: 0-10
PAIN_FUNCTIONAL_ASSESSMENT: PAINAD (PAIN ASSESSMENT IN ADVANCED DEMENTIA SCALE)
PAIN_FUNCTIONAL_ASSESSMENT: PAINAD (PAIN ASSESSMENT IN ADVANCED DEMENTIA SCALE)
PAIN_FUNCTIONAL_ASSESSMENT: 0-10
PAIN_FUNCTIONAL_ASSESSMENT: 0-10

## 2025-08-11 ASSESSMENT — ACTIVITIES OF DAILY LIVING (ADL): HOME_MANAGEMENT_TIME_ENTRY: 12

## 2025-08-11 NOTE — PROGRESS NOTES
Kaitlin Sarkar is a 89 y.o. female on day 6 of admission presenting with Delirium.      Subjective   Patient seen and examined at bedside.  Gutierrez inserted in ED.  A-fib RVR this morning.       Objective     Last Recorded Vitals  /75 (BP Location: Left arm, Patient Position: Lying)   Pulse 82   Temp 36.3 °C (97.3 °F) (Temporal)   Resp 18   Wt 45.8 kg (100 lb 15.5 oz)   SpO2 97%   Intake/Output last 3 Shifts:    Intake/Output Summary (Last 24 hours) at 8/11/2025 1403  Last data filed at 8/11/2025 1312  Gross per 24 hour   Intake 200 ml   Output --   Net 200 ml       Admission Weight  Weight: 52.5 kg (115 lb 11.2 oz) (08/05/25 0155)    Daily Weight  08/11/25 : 45.8 kg (100 lb 15.5 oz)    Image Results  ECG 12 lead  Atrial fibrillation  Right bundle branch block  Nonspecific T abnormalities, lateral leads    See ED provider note for full interpretation and clinical correlation  Confirmed by Pearl Gatica ([id number]) on 8/9/2025 11:20:49 AM      Gen - NAD AAOx3  CV -irregular tachycardic  Pulm - Fair air entry bilaterally, no wheeze  Abd - Soft NT ND  Ext - No LE edema/rash    Relevant Results                Assessment & Plan      Acute cerebral infarction  MRI of the brain showed bilateral cerebral infarction significant for left MCA distribution.  Likely embolic.  Eliquis on hold for 7 days to prevent hemorrhagic transformation (resume 8/12).  Continue atorvastatin   Resume ASA  Echocardiogram EF 55%, otherwise unremarkable  PT/OT following  Neurology consulted, continue supportive care and holding AC as above     Multiple hemosiderin foci - right>left>midbrain  Etiology unclear - amyloid angiopathy  ED d/w neurosurgery felt to be stable     Acute encephalopathy  Multifactorial from CVA, MITUL, UTI, urinary retention  Anticoagulation on hold  Mental status improved  Ammonia B12 TSH WNL    Urinary retention and right hydronephrosis  U/S shows moderate R hydronephrosis and mild L pelvicaliectasis. Renal parenchymal  echogenicity is increased bilaterally consistent with medical renal disease  Urologist consulted, recommended llanes then voiding trial, outpatient followup  Continue tamsulosin 0.4mg every day      Atrial fibrillation  Episodes of RVR - Mg<2.0, improved after IV magnesium sulfate  Continue metoprolol tartrate 50mg PO BID  Anticoagulation on hold due to ischemic stroke (7d)      HTN  Continue home medications           Giancarlo Duque MD MPH

## 2025-08-11 NOTE — PROGRESS NOTES
Physical Therapy    Physical Therapy Treatment    Patient Name: Kaitlin Sarkar  MRN: [id number]  Department: [address]  Room: 2028/2028-A  Today's Date: 8/11/2025  Time Calculation  Start Time: 1215  Stop Time: 1242  Time Calculation (min): 27 min    Assessment/Plan   PT Assessment  Barriers to Discharge Home: Physical needs, Cognition needs, Caregiver assistance  End of Session Communication: Bedside nurse  Assessment Comment: pt demonstrated an increase in alertness and ability to follow commands this tx. pt continues to exhibit a forward flexed posture with knee flexion but able to correct with cues  End of Session Patient Position: Up in chair, Alarm on     PT Plan  Treatment/Interventions: Bed mobility, Transfer training, Gait training, Neuromuscular re-education, Therapeutic exercise, Therapeutic activity  PT Plan: Ongoing PT  PT Frequency: 5 times per week (during this acute adm)  PT Discharge Recommendations: Moderate intensity level of continued care, Other (comment) (Based on current functional status and rehab potential, patient is anticipated to tolerate and benefit from 5 or more days per week of skilled rehabilitative therapy after discharge from this acute inpatient hospitalization.)  Equipment Recommended upon Discharge: Wheelchair, Other (comment) (bedrails. recomm dtr borrow or rent a wheelchair for initial mobility, anticipate pt will needed extended rehab before able to amb well)  PT - OK to Discharge: Yes (to next level of care)    PT Visit Info:  PT Received On: 08/11/25     General Visit Information:   General  Prior to Session Communication: Bedside nurse  Patient Position Received: Up in chair, Alarm on  General Comment: pt alert and agreeable; daughter present    Subjective   Precautions:  Precautions  Medical Precautions: Fall precautions     Date/Time Vitals Session Patient Position Pulse Resp SpO2 BP MAP (mmHg)    08/11/25 1136 --  --  82  18  97 %  124/75  91      Objective   Pain:  Pain  Assessment  Pain Assessment: 0-10  0-10 (Numeric) Pain Score: 0 - No pain  Cognition:  Cognition  Orientation Level: Disoriented to time, Disoriented to situation    Activity Tolerance:  Activity Tolerance  Endurance: Tolerates 10 - 20 min exercise with multiple rests  Treatments:  Therapeutic Exercise  Therapeutic Exercise Performed: Yes  Therapeutic Exercise Activity 1: pt completed seated LE exercises: AP x 10 reps, LAQ x 10 reps, hip flexion (with Min) x 10 reps, hip adduction x 10 reps    Therapeutic Activity  Therapeutic Activity Performed: Yes  Therapeutic Activity 1: pt completed 4 static stands with Mod Ax2 to maintain; final stand able to maintain for 1.5 min    Ambulation/Gait Training  Ambulation/Gait Training Performed: Yes  Ambulation/Gait Training 1  Surface 1: Level tile  Device 1: Rolling walker  Assistance 1: Moderate assistance, Moderate verbal cues, Moderate tactile cues (+2)  Quality of Gait 1: Diminished heel strike, Narrow base of support, Inconsistent stride length, Decreased step length, Shuffling gait, Forward flexed posture  Comments/Distance (ft) 1: 8feet x 2  Transfers  Transfer: Yes  Transfer 1  Technique 1: Sit to stand, Stand to sit  Transfer Device 1: Walker  Transfer Level of Assistance 1: Moderate assistance, +2, Moderate verbal cues, Moderate tactile cues  Trials/Comments 1: cues for hand placement    Outcome Measures:  [facility] Basic Mobility  Turning from your back to your side while in a flat bed without using bedrails: A lot  Moving from lying on your back to sitting on the side of a flat bed without using bedrails: A lot  Moving to and from bed to chair (including a wheelchair): A lot  Standing up from a chair using your arms (e.g. wheelchair or bedside chair): A lot  To walk in hospital room: A lot  Climbing 3-5 steps with railing: Total  Basic Mobility - Total Score: 11    Education Documentation  Precautions, taught by Huyen Miller PTA at 8/11/2025  1:27 PM.  Learner:  Patient  Readiness: Acceptance  Method: Explanation  Response: Needs Reinforcement    Mobility Training, taught by Huyen Miller PTA at 8/11/2025  1:27 PM.  Learner: Patient  Readiness: Acceptance  Method: Explanation  Response: Needs Reinforcement    Education Comments  No comments found.        OP EDUCATION:       Encounter Problems       Encounter Problems (Active)       Mobility       STG - Patient will ambulate household distance using WALKER (instead of cane used at baseline) with no more than min Ax1 so that dtr can manage pt at home (Progressing)       Start:  08/06/25    Expected End:  08/20/25            STG - Patient will navigate 2 steps with rails/device and no more than MOD Ax1 to enter/exit home with dtr assist  (Not Progressing)       Start:  08/06/25    Expected End:  08/20/25               PT Transfers       STG - Transfer from bed to chair/BSC using WALKER (instead of cane used at baseline) with no more than min Ax1 so that dtr can manage pt at home (Progressing)       Start:  08/06/25    Expected End:  08/20/25               Strengthening        Pt will perform 10+ reps AAROM/AROM/RROM BLE to improve functional strength needed for improved mobility.  (Progressing)       Start:  08/06/25    Expected End:  08/20/25

## 2025-08-11 NOTE — PROGRESS NOTES
Per Dr. Duque during rounds pt is medically ready pending accepting facility. Pt's facility choices - two declined acceptance and one can not take until 8/15. TCC to re-choice. TCC following.     1432: Spoke to pt's daughter Alma at bedside in regards to needing new SNF choices. Two of the facilities declined and the other can not take pt until 8/15. TCC provided a new [facility] list. Alma to look over list. TCC following. Pt medically ready once accepting facility is identified. No auth needed.

## 2025-08-11 NOTE — CARE PLAN
The patient's goals for the shift include get rest and stay informed.    The clinical goals for the shift include Patient will remain free of falls throughout the shift.

## 2025-08-11 NOTE — PROGRESS NOTES
Occupational Therapy    Occupational Therapy Treatment    Name: Kaitlin Sarkar  MRN: [id number]  Department: [facility]note 3[facility] W  Room: 2028/2028-  Date: 08/11/25  Time Calculation  Start Time: 1217  Stop Time: 1243  Time Calculation (min): 26 min    Assessment:  OT Assessment: Pt progressing to mod assist x2 for functional transfers and mobiltiy with FWW with chair follow. Pt follows 1 step commands and requires cues for hand placement and walker management throughout session. Max assist for LB dressing and min assist for seated grooming. Pt would benefit from continued OT services to increase activity tolerance, standing balance, strength in preparation for transfers, and independence with ADLs.  Barriers to Discharge Home: Caregiver assistance, Physical needs, Cognition needs  End of Session Communication: Bedside nurse  End of Session Patient Position: Up in chair, Alarm on  Plan:  Treatment Interventions: Functional transfer training, UE strengthening/ROM, Endurance training, Patient/family training  OT Frequency: 4 times per week (during this acute hospital stay)  OT Discharge Recommendations: Moderate intensity level of continued care (Based on current functional status and rehab potential, patient is anticipated to tolerate and benefit from 5 or more days per week of skilled rehabilitative therapy after discharge from this acute inpatient hospitalization.)  OT Recommended Transfer Status: Dependent, Assist of 2  OT - OK to Discharge: Yes (when medically stable)    Subjective     OT Visit Info:  OT Received On: 08/11/25  General:  General  Co-Treatment: PT  Co-Treatment Reason: To maximize pt safety and function  Prior to Session Communication: Bedside nurse  Patient Position Received: Up in chair, Alarm on  General Comment: Pt alert and agreeable to therapy. Daughter present.  Precautions:  Medical Precautions: Fall precautions  Precautions Comment: fall risk    Pain Assessment:  Pain Assessment  Pain Assessment:  0-10  0-10 (Numeric) Pain Score: 0 - No pain    Objective   Cognition:  Orientation Level: Disoriented to time, Disoriented to situation  Activities of Daily Living:      Grooming  Grooming Level of Assistance: Minimum assistance, Moderate verbal cues  Grooming Where Assessed: Chair  Grooming Comments: Pt wipes nose and face with cues to initiate and min assist for thorough completion.    LE Dressing  LE Dressing: Yes  Sock Level of Assistance: Maximum assistance  LE Dressing Where Assessed: Recliner  LE Dressing Comments: Pt brings RLE to chest in attempt to don R sock. Assist to complete    Functional Standing Tolerance:  Functional Standing Tolerance  Time: 1 min 30 sec  Activity: standing tolerance training  Functional Standing Tolerance Comments: mod assist x2 to maintain  Bed Mobility/Transfers:      Transfers  Transfer: Yes  Transfer 1  Technique 1: Sit to stand, Stand to sit  Transfer Device 1: Walker  Transfer Level of Assistance 1: Moderate assistance, +2, Moderate verbal cues  Trials/Comments 1: verbal and tactile cues for hand and foot placement. x4 trials from chair    Functional Mobility:  Functional Mobility  Functional Mobility Performed: Yes  Functional Mobility 1  Surface 1: Level tile  Device 1: Rolling walker  Assistance 1: Moderate assistance (x2)  Comments 1: forward steps with chair follow and mod assist x2. Cues to initiate steps and guide walker.     Therapy/Activity:      Therapeutic Activity  Therapeutic Activity Performed: Yes  Therapeutic Activity 1: Pt participated in functional transfers/mobility, LB dressing, standing tolerance training, seated grooming.      Outcome Measures:  [facility] Daily Activity  Putting on and taking off regular lower body clothing: A lot  Bathing (including washing, rinsing, drying): A lot  Putting on and taking off regular upper body clothing: A lot  Toileting, which includes using toilet, bedpan or urinal: A lot  Taking care of personal grooming such as  brushing teeth: A lot  Eating Meals: A little  Daily Activity - Total Score: 13        Education Documentation  Body Mechanics, taught by KINZA Hammonds at 8/11/2025  1:23 PM.  Learner: Family, Patient  Readiness: Acceptance  Method: Explanation  Response: Needs Reinforcement    ADL Training, taught by KINZA Hammonds at 8/11/2025  1:23 PM.  Learner: Family, Patient  Readiness: Acceptance  Method: Explanation  Response: Needs Reinforcement    Education Comments  No comments found.      Goals:  Encounter Problems       Encounter Problems (Active)       ADLs       Patient will feed self with minimal assist  level of assistance and verbal cues and tactile cues using PRN adaptive equipment. (Progressing)       Start:  08/07/25            Patient will complete daily grooming tasks washing face/brushing hair with minimal assist  level of assistance and PRN adaptive equipment while supported sitting. (Progressing)       Start:  08/07/25               BALANCE       Patient will maintain static standing balance fro 5 mins during ADL task with minimal assist  level of assistance in order to demonstrate decreased risk of falling and improved postural control. (Progressing)       Start:  08/07/25               EXERCISE/STRENGTHENING       Patient will be educated on BUE HEP for increased ADL performance. (Progressing)       Start:  08/07/25    Expected End:  08/21/25               MOBILITY       Patient will perform functional mobility min household distances with minimal assist  level of assistance and least restrictive device in order to improve safety and functional mobility. (Progressing)       Start:  08/07/25    Expected End:  08/21/25               TRANSFERS       Patient will complete functional transfers with least restrictive device with minimal assist  level of assistance. (Progressing)       Start:  08/07/25    Expected End:  08/21/25

## 2025-08-12 ENCOUNTER — DOCUMENTATION (OUTPATIENT)
Dept: INPATIENT UNIT | Facility: HOSPITAL | Age: OVER 89
End: 2025-08-12

## 2025-08-12 VITALS
HEIGHT: 62 IN | DIASTOLIC BLOOD PRESSURE: 73 MMHG | OXYGEN SATURATION: 97 % | WEIGHT: 95.68 LBS | TEMPERATURE: 98.8 F | RESPIRATION RATE: 14 BRPM | HEART RATE: 81 BPM | BODY MASS INDEX: 17.61 KG/M2 | SYSTOLIC BLOOD PRESSURE: 127 MMHG

## 2025-08-12 LAB
ANION GAP SERPL CALC-SCNC: 10 MMOL/L (ref 10–20)
BUN SERPL-MCNC: 26 MG/DL (ref 6–23)
CALCIUM SERPL-MCNC: 8.2 MG/DL (ref 8.6–10.3)
CHLORIDE SERPL-SCNC: 104 MMOL/L (ref 98–107)
CO2 SERPL-SCNC: 25 MMOL/L (ref 21–32)
CREAT SERPL-MCNC: 0.99 MG/DL (ref 0.5–1.05)
EGFRCR SERPLBLD CKD-EPI 2021: 53 ML/MIN/1.73M*2
GLUCOSE SERPL-MCNC: 122 MG/DL (ref 74–99)
MAGNESIUM SERPL-MCNC: 2.2 MG/DL (ref 1.6–2.4)
POTASSIUM SERPL-SCNC: 3.9 MMOL/L (ref 3.5–5.3)
SODIUM SERPL-SCNC: 135 MMOL/L (ref 136–145)

## 2025-08-12 PROCEDURE — 2500000001 HC RX 250 WO HCPCS SELF ADMINISTERED DRUGS (ALT 637 FOR MEDICARE OP): Performed by: INTERNAL MEDICINE

## 2025-08-12 PROCEDURE — 80048 BASIC METABOLIC PNL TOTAL CA: CPT | Performed by: INTERNAL MEDICINE

## 2025-08-12 PROCEDURE — 83735 ASSAY OF MAGNESIUM: CPT | Performed by: INTERNAL MEDICINE

## 2025-08-12 PROCEDURE — 97530 THERAPEUTIC ACTIVITIES: CPT | Mod: GO,CO

## 2025-08-12 PROCEDURE — 2500000001 HC RX 250 WO HCPCS SELF ADMINISTERED DRUGS (ALT 637 FOR MEDICARE OP): Performed by: NURSE PRACTITIONER

## 2025-08-12 PROCEDURE — 99239 HOSP IP/OBS DSCHRG MGMT >30: CPT | Performed by: INTERNAL MEDICINE

## 2025-08-12 PROCEDURE — 36415 COLL VENOUS BLD VENIPUNCTURE: CPT | Performed by: INTERNAL MEDICINE

## 2025-08-12 PROCEDURE — 2500000004 HC RX 250 GENERAL PHARMACY W/ HCPCS (ALT 636 FOR OP/ED): Performed by: NURSE PRACTITIONER

## 2025-08-12 PROCEDURE — 97116 GAIT TRAINING THERAPY: CPT | Mod: GP,CQ

## 2025-08-12 PROCEDURE — 2500000002 HC RX 250 W HCPCS SELF ADMINISTERED DRUGS (ALT 637 FOR MEDICARE OP, ALT 636 FOR OP/ED): Performed by: NURSE PRACTITIONER

## 2025-08-12 PROCEDURE — 2500000002 HC RX 250 W HCPCS SELF ADMINISTERED DRUGS (ALT 637 FOR MEDICARE OP, ALT 636 FOR OP/ED): Performed by: FAMILY MEDICINE

## 2025-08-12 PROCEDURE — 97535 SELF CARE MNGMENT TRAINING: CPT | Mod: GO,CO

## 2025-08-12 PROCEDURE — 97110 THERAPEUTIC EXERCISES: CPT | Mod: GP,CQ

## 2025-08-12 PROCEDURE — 2500000001 HC RX 250 WO HCPCS SELF ADMINISTERED DRUGS (ALT 637 FOR MEDICARE OP): Performed by: FAMILY MEDICINE

## 2025-08-12 RX ORDER — METOPROLOL TARTRATE 50 MG/1
100 TABLET ORAL ONCE
Status: DISCONTINUED | OUTPATIENT
Start: 2025-08-12 | End: 2025-08-12 | Stop reason: HOSPADM

## 2025-08-12 RX ORDER — POLYETHYLENE GLYCOL 3350 17 G/17G
17 POWDER, FOR SOLUTION ORAL DAILY
Start: 2025-08-13

## 2025-08-12 RX ORDER — ASPIRIN 81 MG/1
81 TABLET ORAL DAILY
Start: 2025-08-13 | End: 2025-09-12

## 2025-08-12 RX ORDER — ATORVASTATIN CALCIUM 40 MG/1
40 TABLET, FILM COATED ORAL NIGHTLY
Start: 2025-08-12

## 2025-08-12 RX ORDER — TAMSULOSIN HYDROCHLORIDE 0.4 MG/1
0.4 CAPSULE ORAL DAILY
Start: 2025-08-13

## 2025-08-12 RX ORDER — METOPROLOL SUCCINATE 50 MG/1
100 TABLET, EXTENDED RELEASE ORAL DAILY
Status: DISCONTINUED | OUTPATIENT
Start: 2025-08-13 | End: 2025-08-12 | Stop reason: HOSPADM

## 2025-08-12 RX ORDER — METOPROLOL TARTRATE 50 MG/1
50 TABLET ORAL ONCE
Status: COMPLETED | OUTPATIENT
Start: 2025-08-12 | End: 2025-08-12

## 2025-08-12 RX ADMIN — PANTOPRAZOLE SODIUM 40 MG: 40 INJECTION, POWDER, FOR SOLUTION INTRAVENOUS at 06:01

## 2025-08-12 RX ADMIN — Medication 1000 MCG: at 08:55

## 2025-08-12 RX ADMIN — LOSARTAN POTASSIUM 25 MG: 25 TABLET, FILM COATED ORAL at 08:55

## 2025-08-12 RX ADMIN — LEVOTHYROXINE SODIUM 125 MCG: 0.12 TABLET ORAL at 06:01

## 2025-08-12 RX ADMIN — METOPROLOL TARTRATE 50 MG: 50 TABLET, FILM COATED ORAL at 11:28

## 2025-08-12 RX ADMIN — FERROUS SULFATE TAB 325 MG (65 MG ELEMENTAL FE) 1 TABLET: 325 (65 FE) TAB at 08:55

## 2025-08-12 RX ADMIN — TAMSULOSIN HYDROCHLORIDE 0.4 MG: 0.4 CAPSULE ORAL at 08:55

## 2025-08-12 RX ADMIN — METOPROLOL TARTRATE 50 MG: 50 TABLET, FILM COATED ORAL at 08:55

## 2025-08-12 RX ADMIN — Medication 25 MCG: at 08:55

## 2025-08-12 RX ADMIN — APIXABAN 2.5 MG: 2.5 TABLET, FILM COATED ORAL at 08:55

## 2025-08-12 RX ADMIN — ASPIRIN 81 MG: 81 TABLET, DELAYED RELEASE ORAL at 08:55

## 2025-08-12 RX ADMIN — OXYCODONE HYDROCHLORIDE AND ACETAMINOPHEN 500 MG: 500 TABLET ORAL at 08:55

## 2025-08-12 ASSESSMENT — COGNITIVE AND FUNCTIONAL STATUS - GENERAL
PERSONAL GROOMING: TOTAL
DRESSING REGULAR UPPER BODY CLOTHING: A LOT
HELP NEEDED FOR BATHING: TOTAL
TURNING FROM BACK TO SIDE WHILE IN FLAT BAD: A LOT
MOBILITY SCORE: 11
TOILETING: TOTAL
MOVING TO AND FROM BED TO CHAIR: A LOT
CLIMB 3 TO 5 STEPS WITH RAILING: TOTAL
HELP NEEDED FOR BATHING: A LOT
MOBILITY SCORE: 6
MOVING TO AND FROM BED TO CHAIR: TOTAL
EATING MEALS: A LITTLE
MOVING FROM LYING ON BACK TO SITTING ON SIDE OF FLAT BED WITH BEDRAILS: A LOT
STANDING UP FROM CHAIR USING ARMS: TOTAL
TOILETING: TOTAL
TURNING FROM BACK TO SIDE WHILE IN FLAT BAD: TOTAL
WALKING IN HOSPITAL ROOM: TOTAL
EATING MEALS: TOTAL
DRESSING REGULAR LOWER BODY CLOTHING: TOTAL
STANDING UP FROM CHAIR USING ARMS: A LOT
CLIMB 3 TO 5 STEPS WITH RAILING: TOTAL
DRESSING REGULAR LOWER BODY CLOTHING: A LOT
DAILY ACTIVITIY SCORE: 12
DRESSING REGULAR UPPER BODY CLOTHING: TOTAL
MOVING FROM LYING ON BACK TO SITTING ON SIDE OF FLAT BED WITH BEDRAILS: TOTAL
WALKING IN HOSPITAL ROOM: A LOT
PERSONAL GROOMING: A LOT
DAILY ACTIVITIY SCORE: 6

## 2025-08-12 ASSESSMENT — PAIN SCALES - GENERAL
PAINLEVEL_OUTOF10: 0 - NO PAIN

## 2025-08-12 ASSESSMENT — PAIN - FUNCTIONAL ASSESSMENT
PAIN_FUNCTIONAL_ASSESSMENT: 0-10
PAIN_FUNCTIONAL_ASSESSMENT: 0-10

## 2025-08-12 ASSESSMENT — ACTIVITIES OF DAILY LIVING (ADL): HOME_MANAGEMENT_TIME_ENTRY: 15

## 2025-08-12 NOTE — DISCHARGE SUMMARY
Discharge Diagnosis  Delirium  Acute CVA  Urinary retention           Issues Requiring Follow-Up  Follow-up with primary care provider and urology and neurology as outpatient    Discharge Meds     Medication List      START taking these medications     aspirin 81 mg EC tablet; Take 1 tablet (81 mg) by mouth once daily.;   Start taking on: August 13, 2025   atorvastatin 40 mg tablet; Commonly known as: Lipitor; Take 1 tablet (40   mg) by mouth once daily at bedtime.   polyethylene glycol 17 gram packet; Commonly known as: Glycolax,   Miralax; Take 17 g by mouth once daily.; Start taking on: August 13, 2025   tamsulosin 0.4 mg 24 hr capsule; Commonly known as: Flomax; Take 1   capsule (0.4 mg) by mouth once daily. Do not crush, chew, or split.; Start   taking on: August 13, 2025     CONTINUE taking these medications     apixaban 2.5 mg tablet; Commonly known as: Eliquis   ascorbic acid 500 mg tablet; Commonly known as: Vitamin C   CALCIUM ORAL   cyanocobalamin 1,000 mcg tablet; Commonly known as: Vitamin B-12   ferrous sulfate 325 mg (65 mg elemental) tablet   levothyroxine 125 mcg tablet; Commonly known as: Synthroid, Levoxyl   losartan 25 mg tablet; Commonly known as: Cozaar   metoprolol succinate  mg 24 hr tablet; Commonly known as:   Toprol-XL   Vitamin D3 25 mcg (1,000 units) tablet; Generic drug: cholecalciferol     STOP taking these medications     alum-mag hydroxide-simeth 200-200-20 mg/5 mL oral suspension; Commonly   known as: Mylanta   Cipro 250 mg tablet; Generic drug: ciprofloxacin   lidocaine 4 % patch   Macrobid 100 mg capsule; Generic drug: nitrofurantoin   (macrocrystal-monohydrate)   ondansetron 4 mg tablet; Commonly known as: Zofran       Test Results Pending At Discharge  Pending Labs       No current pending labs.            Hospital Course  Kaitlin Sarkar is a 89-year-old female with a history of hypertension, atrial fibrillation on anticoagulation with Eliquis, and memory loss, who presented  with worsening confusion and behavioral changes. 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 Her anticoagulation was held for 7 days to reduce hemorrhagic risk, and she was managed with supportive care addressing her acute encephalopathy, infection, acute kidney injury, and urinary retention. Neurology and neurosurgery were consulted, and plans include continued monitoring, stroke risk management, and rehabilitation with physical and occupational therapy.  Patient can and has resumed her Eliquis on 8/12/2025 and will be discharged to rehab once arranged.    34 minutes spent in discharge timing    Pertinent Physical Exam At Time of Discharge  General: Elderly frail female not in acute distress on room air  HEENT: PERRLA, head intact and normocephalic  Neck: Normal to inspection  Lungs: Clear to auscultation, work of breathing within normal limit  Cardiac: Regular rate and rhythm  Abdomen: Soft nontender, positive bowel sounds  : Exam deferred  Skin: Intact  Hematology: No petechia or excessive ecchymosis  Musculoskeletal: Without significant trauma.  Muscle wasting is noted  Neurological: Alert awake oriented, no focal deficit, cranial nerves grossly intact  Psych: No suicidal ideation or homicidal ideation    Outpatient Follow-Up  No future appointments.      Twin Traore MD

## 2025-08-12 NOTE — CARE PLAN
The patient's goals for the shift include get rest and stay informed.    The clinical goals for the shift include Pt will remain free of falls throughout the shift.

## 2025-08-12 NOTE — PROGRESS NOTES
Occupational Therapy    Occupational Therapy Treatment    Name: Kaitlin Sarkar  MRN: [id number]  Department: [facility]  Room: 2028/2028-  Date: 08/12/25  Time Calculation  Start Time: 1006  Stop Time: 1036  Time Calculation (min): 30 min    Assessment:  OT Assessment: Pt requires mod assist x2 for functional transfers and max assist x2 for functional mobility with FWW. Increased cues for sequencing and hand/foot placement. Max assist for ADLs. Pt would benefit from continued OT services to increase balance, activity tolerance, and independence with ADLs.  Barriers to Discharge Home: Caregiver assistance, Physical needs, Cognition needs  End of Session Communication: Bedside nurse  End of Session Patient Position: Up in chair, Alarm on  Plan:  Treatment Interventions: Functional transfer training, UE strengthening/ROM, Endurance training, Patient/family training  OT Frequency: 4 times per week (during this acute hospital stay)  OT Discharge Recommendations: Moderate intensity level of continued care (Based on current functional status and rehab potential, patient is anticipated to tolerate and benefit from 5 or more days per week of skilled rehabilitative therapy after discharge from this acute inpatient hospitalization.)  OT Recommended Transfer Status: Dependent, Assist of 2  OT - OK to Discharge: Yes (when medically stable)    Subjective     OT Visit Info:  OT Received On: 08/12/25  General:  General  Co-Treatment: PT  Co-Treatment Reason: To maximize pt safety and function  Prior to Session Communication: Bedside nurse  Patient Position Received: Bed, 3 rail up, Alarm on  General Comment: Pt alert and agreeable to therapy. Daughter present.  Precautions:  Medical Precautions: Fall precautions  Precautions Comment: fall risk    Pain Assessment:  Pain Assessment  Pain Assessment: 0-10  0-10 (Numeric) Pain Score: 0 - No pain    Objective   Cognition:  Orientation Level: Disoriented to situation, Disoriented to  time  Activities of Daily Living:      Grooming  Grooming Level of Assistance: Minimum assistance, Moderate verbal cues  Grooming Where Assessed: Chair  Grooming Comments: Pt wipes nose and face with cues to initiate and min assist for thorough completion.    UE Dressing  UE Dressing Level of Assistance: Maximum assistance  UE Dressing Where Assessed: Edge of bed  UE Dressing Comments: Pt requires increased cues for sequencing to doff/don hospital gown.    LE Dressing  LE Dressing: Yes  Sock Level of Assistance: Maximum assistance  Adult Briefs Level of Assistance: Maximum assistance  LE Dressing Where Assessed: Edge of bed  LE Dressing Comments: Attempt to don L sock, cues for sequencing but difficulty following one step commands. Max assist to thread BLEs into brief, pt attempts to bring to knees assist to complete and advance over hips once standing with FWW.    Toileting  Toileting Level of Assistance: Maximum assistance  Where Assessed: Bedside commode  Toileting Comments: Assist to manage LB clothing and complete toilet hygiene standing at [facility] with FWW    Functional Standing Tolerance:  Functional Standing Tolerance  Time: 1 min 30 sec  Activity: standing tolerance training  Functional Standing Tolerance Comments: mod assist x2 to maintain  Bed Mobility/Transfers: Bed Mobility  Bed Mobility: Yes  Bed Mobility 1  Bed Mobility 1: Supine to sitting  Level of Assistance 1: Moderate assistance, +2  Bed Mobility Comments 1: cues for initiation and sequencing. [place] assist to reach hand to bed rail    Transfers  Transfer: Yes  Transfer 1  Technique 1: Sit to stand, Stand to sit  Transfer Device 1: Walker  Transfer Level of Assistance 1: Moderate assistance, +2, Moderate verbal cues  Trials/Comments 1: verbal and tactile cues for hand placement x1 EOB, x1 chair x1 BSC    Toilet Transfers  Toilet Transfer From: Chair  Toilet Transfer Type: To and from  Toilet Transfer to: Standard bedside commode  Toilet Transfer  Technique: Lateral  Toilet Transfers: Maximal assistance (x2)  Toilet Transfers Comments: Difficulty advancing RLE to take lateral side steps to BSC from chair.    Functional Mobility:  Functional Mobility  Functional Mobility Performed: Yes  Functional Mobility 1  Surface 1: Level tile  Device 1: Rolling walker  Assistance 1: Maximum assistance (x2)  Comments 1: Assist for walker management during functional mobility within min household distance x2 trials. Cues to maintain good posture.  Sitting Balance:  Static Sitting Balance  Static Sitting-Balance Support: Feet supported, No upper extremity supported  Static Sitting-Level of Assistance: Minimum assistance     Therapy/Activity:      Therapeutic Activity  Therapeutic Activity Performed: Yes  Therapeutic Activity 1: Pt participated in functional transfers/mobility, LB dressing, toileting, activity tolerance training.    Outcome Measures:  [facility] Daily Activity  Putting on and taking off regular lower body clothing: A lot  Bathing (including washing, rinsing, drying): A lot  Putting on and taking off regular upper body clothing: A lot  Toileting, which includes using toilet, bedpan or urinal: Total (llanes)  Taking care of personal grooming such as brushing teeth: A lot  Eating Meals: A little  Daily Activity - Total Score: 12        Education Documentation  Body Mechanics, taught by KINZA Hammonds at 8/12/2025 11:25 AM.  Learner: Family, Patient  Readiness: Acceptance  Method: Explanation  Response: Needs Reinforcement    ADL Training, taught by KINZA Hammonds at 8/12/2025 11:25 AM.  Learner: Family, Patient  Readiness: Acceptance  Method: Explanation  Response: Needs Reinforcement    Body Mechanics, taught by KINZA Hammonds at 8/11/2025  1:23 PM.  Learner: Family, Patient  Readiness: Acceptance  Method: Explanation  Response: Needs Reinforcement    ADL Training, taught by KINZA Hammonds at 8/11/2025  1:23 PM.  Learner: Family, Patient  Readiness:  Acceptance  Method: Explanation  Response: Needs Reinforcement    Education Comments  No comments found.      Goals:  Encounter Problems       Encounter Problems (Active)       ADLs       Patient will feed self with minimal assist  level of assistance and verbal cues and tactile cues using PRN adaptive equipment. (Progressing)       Start:  08/07/25            Patient will complete daily grooming tasks washing face/brushing hair with minimal assist  level of assistance and PRN adaptive equipment while supported sitting. (Progressing)       Start:  08/07/25               BALANCE       Patient will maintain static standing balance fro 5 mins during ADL task with minimal assist  level of assistance in order to demonstrate decreased risk of falling and improved postural control. (Progressing)       Start:  08/07/25               EXERCISE/STRENGTHENING       Patient will be educated on BUE HEP for increased ADL performance. (Progressing)       Start:  08/07/25    Expected End:  08/21/25               MOBILITY       Patient will perform functional mobility min household distances with minimal assist  level of assistance and least restrictive device in order to improve safety and functional mobility. (Progressing)       Start:  08/07/25    Expected End:  08/21/25               TRANSFERS       Patient will complete functional transfers with least restrictive device with minimal assist  level of assistance. (Progressing)       Start:  08/07/25    Expected End:  08/21/25

## 2025-08-12 NOTE — PROGRESS NOTES
Spoke to pt's daughter Alma at bedside. Updated Alma that [facility] can now accept pt. Alma agreeable for pt to place with [facility]. TCC provided Alma with IMM. TCC answered all questions and concerns. Transport pending. RN aware. TCC following.     1117: Discharge transport confirmed for 330 pm via [facility]. Transport time and report # sent to nursing via Secure Chat.

## 2025-08-12 NOTE — HOSPITAL COURSE
Kaitlin Sarkar is a 89-year-old female with a history of hypertension, atrial fibrillation on anticoagulation with Eliquis, and memory loss, who presented with worsening confusion and behavioral changes. 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 Her anticoagulation was held for 7 days to reduce hemorrhagic risk, and she was managed with supportive care addressing her acute encephalopathy, infection, acute kidney injury, and urinary retention. Neurology and neurosurgery were consulted, and plans include continued monitoring, stroke risk management, and rehabilitation with physical and occupational therapy.  Patient can and has resumed her Eliquis on 8/12/2025 and will be discharged to rehab once arranged.    34 minutes spent in discharge timing

## 2025-08-12 NOTE — PROGRESS NOTES
Physical Therapy    Physical Therapy Treatment    Patient Name: Kaitlin Sarkar  MRN: [id number]  Department: [address]  Room: 2028/2028-A  Today's Date: 8/12/2025  Time Calculation  Start Time: 1007  Stop Time: 1036  Time Calculation (min): 29 min         Assessment/Plan   PT Assessment  Barriers to Discharge Home: Physical needs, Cognition needs, Caregiver assistance  End of Session Communication: Bedside nurse  Assessment Comment: patient with forward flexed posture, initally having  increased difficulty following commands, but did improve as session continued but still delayed processing  End of Session Patient Position: Up in chair, Alarm on (messaged nurse to double check)  PT Plan  Inpatient/Swing Bed or Outpatient: Inpatient  PT Plan  Treatment/Interventions: Bed mobility, Transfer training, Gait training, Neuromuscular re-education, Therapeutic exercise, Therapeutic activity  PT Plan: Ongoing PT  PT Frequency: 5 times per week (during this acute adm)  PT Discharge Recommendations: Moderate intensity level of continued care, Other (comment) (Based on current functional status and rehab potential, patient is anticipated to tolerate and benefit from 5 or more days per week of skilled rehabilitative therapy after discharge from this acute inpatient hospitalization.)  Equipment Recommended upon Discharge: Wheelchair, Other (comment) (bedrails. recomm dtr borrow or rent a wheelchair for initial mobility, anticipate pt will needed extended rehab before able to amb well)  PT - OK to Discharge: Yes (to next level of care)    PT Visit Info:  PT Received On: 08/12/25  Response to Previous Treatment: Patient reporting fatigue but able to participate.     General Visit Information:   General  Co-Treatment: OT  Co-Treatment Reason: To maximize pt safety and function  Prior to Session Communication: Bedside nurse  Patient Position Received: Bed, 3 rail up, Alarm on  General Comment: patient in bed agreeable to therapy, daughter  present.     Subjective   Precautions:  Precautions  Precautions Comment: fall risk      Objective   Pain:  Pain Assessment  Pain Assessment: 0-10  0-10 (Numeric) Pain Score: 0 - No pain (no comlaints of pain)  Cognition:  Cognition  Orientation Level: Disoriented to place, Disoriented to time, Disoriented to situation    Activity Tolerance:  Activity Tolerance  Endurance: Tolerates 10 - 20 min exercise with multiple rests  Treatments:  patient performed supine to sit with mod assist , x2 , seated marches, LAQ, 10  reps each with min assist. static sitting x 8 mins , sit to stand  x5  reps with mod assist x2. gait training with fWW 15 feetx1, 10 feet x1 and 2 feet x1 all with max assist x2 due to strong retro lean and trying to sit too soon. up in chair daughter present    Outcome Measures:  [facility] Basic Mobility  Turning from your back to your side while in a flat bed without using bedrails: A lot  Moving from lying on your back to sitting on the side of a flat bed without using bedrails: A lot  Moving to and from bed to chair (including a wheelchair): A lot  Standing up from a chair using your arms (e.g. wheelchair or bedside chair): A lot  To walk in hospital room: A lot  Climbing 3-5 steps with railing: Total  Basic Mobility - Total Score: 11    Education Documentation  Precautions, taught by Angela Dodge PTA at 8/12/2025 11:20 AM.  Learner: Patient  Readiness: Acceptance  Method: Explanation  Response: Verbalizes Understanding, Needs Reinforcement  Comment: safety with mobility    Mobility Training, taught by Angela Dodge PTA at 8/12/2025 11:20 AM.  Learner: Patient  Readiness: Acceptance  Method: Explanation  Response: Verbalizes Understanding, Needs Reinforcement  Comment: safety with mobility    Education Comments  No comments found.        OP EDUCATION:       Encounter Problems       Encounter Problems (Active)       Mobility       STG - Patient will ambulate household distance using WALKER (instead of  cane used at baseline) with no more than min Ax1 so that dtr can manage pt at home (Progressing)       Start:  08/06/25    Expected End:  08/20/25            STG - Patient will navigate 2 steps with rails/device and no more than MOD Ax1 to enter/exit home with dtr assist  (Progressing)       Start:  08/06/25    Expected End:  08/20/25               PT Transfers       STG - Transfer from bed to chair/BSC using WALKER (instead of cane used at baseline) with no more than min Ax1 so that dtr can manage pt at home (Progressing)       Start:  08/06/25    Expected End:  08/20/25               Strengthening        Pt will perform 10+ reps AAROM/AROM/RROM BLE to improve functional strength needed for improved mobility.  (Progressing)       Start:  08/06/25    Expected End:  08/20/25

## 2025-08-12 NOTE — DOCUMENTATION CLARIFICATION NOTE
"    PATIENT:               WESLEY LOGAN  ACCT #:                  [id number]  MRN:                       [id number]  :                       10/18/1930  ADMIT DATE:       2025 1:51 AM  DISCH DATE:  RESPONDING PROVIDER #:        [id number]          PROVIDER RESPONSE TEXT:    Metabolic encephalopathy 2/2 CVA    CDI QUERY TEXT:    Clarification        Instruction:    Based on your assessment of the patient and the clinical information, please provide the requested documentation by clicking on the appropriate radio button and enter any additional information if prompted.    Question: Please further clarify the most likely etiology of the altered mental status as    When answering this query, please exercise your independent professional judgment. The fact that a question is being asked, does not imply that any particular answer is desired or expected.    The patient's clinical indicators include:  Clinical Information:8/10/25 IM: \"Acute cerebral infarct.  Multiple hemosiderin foci for ? intracranial hemorrhage.\"    Clinical Indicators:  25 ED: \"Patient's daughter reports that she has been confused like doing things that are out of the norm for her she says that she is fidgeting with toilet tissue and not having much of an appetite like we will hold food to her mouth pretending to eat it but not really eating it also says that she is doing things \"backwards\" she says that when I tell her to do something she does not listen and will not do it.:  25 H/P: \"Suspected urinary tract infection with acute kidney injury possible metabolic encephalopathy  Patient with UTI for outpatient testing which is not available for review \"  25 Neuro: \"Best Verbal Response: Confused\"      Treatment: Neuro consult.  Monitor neuro status, Hold anticoagulation.    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